# Patient Record
Sex: FEMALE | Employment: UNEMPLOYED | URBAN - METROPOLITAN AREA
[De-identification: names, ages, dates, MRNs, and addresses within clinical notes are randomized per-mention and may not be internally consistent; named-entity substitution may affect disease eponyms.]

---

## 2017-01-20 ENCOUNTER — OFFICE VISIT (OUTPATIENT)
Dept: INTERNAL MEDICINE CLINIC | Age: 32
End: 2017-01-20

## 2017-01-20 VITALS
SYSTOLIC BLOOD PRESSURE: 107 MMHG | DIASTOLIC BLOOD PRESSURE: 68 MMHG | BODY MASS INDEX: 24.1 KG/M2 | WEIGHT: 136 LBS | HEART RATE: 116 BPM | HEIGHT: 63 IN | RESPIRATION RATE: 16 BRPM | OXYGEN SATURATION: 96 % | TEMPERATURE: 98.7 F

## 2017-01-20 DIAGNOSIS — Z12.4 SCREENING FOR CERVICAL CANCER: ICD-10-CM

## 2017-01-20 DIAGNOSIS — Z00.00 ROUTINE GENERAL MEDICAL EXAMINATION AT A HEALTH CARE FACILITY: Primary | ICD-10-CM

## 2017-01-20 DIAGNOSIS — F51.04 CHRONIC INSOMNIA: ICD-10-CM

## 2017-01-20 DIAGNOSIS — Z72.0 TOBACCO USE: ICD-10-CM

## 2017-01-20 RX ORDER — TRAZODONE HYDROCHLORIDE 100 MG/1
100 TABLET ORAL
Qty: 30 TAB | Refills: 11 | Status: SHIPPED | OUTPATIENT
Start: 2017-01-20 | End: 2017-03-28 | Stop reason: SDUPTHER

## 2017-01-20 RX ORDER — TRAZODONE HYDROCHLORIDE 100 MG/1
100 TABLET ORAL
COMMUNITY
End: 2017-01-20 | Stop reason: SDUPTHER

## 2017-01-20 RX ORDER — CYCLOBENZAPRINE HCL 5 MG
5 TABLET ORAL
COMMUNITY
End: 2017-05-26

## 2017-01-20 NOTE — MR AVS SNAPSHOT
Visit Information Date & Time Provider Department Dept. Phone Encounter #  
 1/20/2017  1:00 PM Fernie Gamble, [address] [facility] [phone] [id number] Follow-up Instructions Return in about 1 year (around 1/20/2018), or if symptoms worsen or fail to improve, for Annual physical exam. Upcoming Health Maintenance Date Due Pneumococcal 19-64 Medium Risk (1 of 1 - PPSV23) 2/7/2004 DTaP/Tdap/Td series (1 - Tdap) 2/7/2006 INFLUENZA AGE 9 TO ADULT 8/1/2016 PAP AKA CERVICAL CYTOLOGY 12/26/2016 Allergies as of 1/20/2017  Review Complete On: 1/20/2017 By: Fernie Gamble MD  
 No Known Allergies Current Immunizations  Reviewed on 11/18/2015 No immunizations on file. Not reviewed this visit You Were Diagnosed With   
  
 Codes Comments Routine general medical examination at a health care facility    -  Primary ICD-10-CM: Z00.00 ICD-9-CM: V70.0 Chronic insomnia     ICD-10-CM: F51.04 
ICD-9-CM: 780.52 Tobacco use     ICD-10-CM: Z72.0 ICD-9-CM: 305.1 Screening for cervical cancer     ICD-10-CM: Z12.4 ICD-9-CM: V76.2 Vitals BP Pulse Temp Resp Height(growth percentile) Weight(growth percentile) 107/68 (BP 1 Location: Left arm, BP Patient Position: Sitting) (!) 116 98.7 °F (37.1 °C) (Oral) 16 5' 3\" (1.6 m) 136 lb (61.7 kg) SpO2 BMI OB Status Smoking Status 96% 24.09 kg/m2 IUD Current Every Day Smoker BMI and BSA Data Body Mass Index Body Surface Area 24.09 kg/m 2 1.66 m 2 Preferred Pharmacy Pharmacy Name Phone [phone] [place] [phone] Your Updated Medication List  
  
   
This list is accurate as of: 1/20/17  1:45 PM.  Always use your most recent med list.  
  
  
  
  
 cyclobenzaprine 5 mg tablet Commonly known as:  FLEXERIL Take 5 mg by mouth three (3) times daily as needed for Muscle Spasm(s). MIRENA 20 mcg/24 hr (5 years) IUD Generic drug:  levonorgestrel 1 Each by IntraUTERine route once. traZODone 100 mg tablet Commonly known as:  Konrad Rios Take 1 Tab by mouth nightly. Prescriptions Sent to Pharmacy Refills  
 traZODone (DESYREL) 100 mg tablet 11 Sig: Take 1 Tab by mouth nightly. Class: Normal  
 [facility]: [facility] [address] [address]  #: [phone] Route: Oral  
  
We Performed the Following REFERRAL TO GYNECOLOGY [REF30 Custom] Comments:  
 Call [facility] to schedule. Follow-up Instructions Return in about 1 year (around 1/20/2018), or if symptoms worsen or fail to improve, for Annual physical exam.  
  
  
Referral Information Referral ID Referred By Referred To  
  
 [id number] [facility] Not Available Visits Status Start Date End Date 1 New Request 1/20/17 1/20/18 If your referral has a status of pending review or denied, additional information will be sent to support the outcome of this decision. Patient Instructions Well Visit, Ages 25 to 48: Care Instructions Your Care Instructions Physical exams can help you stay healthy. Your doctor has checked your overall health and may have suggested ways to take good care of yourself. He or she also may have recommended tests. At home, you can help prevent illness with healthy eating, regular exercise, and other steps. Follow-up care is a key part of your treatment and safety. Be sure to make and go to all appointments, and call your doctor if you are having problems. It's also a good idea to know your test results and keep a list of the medicines you take. How can you care for yourself at home? · Reach and stay at a healthy weight. This will lower your risk for many problems, such as obesity, diabetes, heart disease, and high blood pressure. · Get at least 30 minutes of physical activity on most days of the week. Walking is a good choice. You also may want to do other activities, such as running, swimming, cycling, or playing tennis or team sports. Discuss any changes in your exercise program with your doctor. · Do not smoke or allow others to smoke around you. If you need help quitting, talk to your doctor about stop-smoking programs and medicines. These can increase your chances of quitting for good. · Talk to your doctor about whether you have any risk factors for sexually transmitted infections (STIs). Having one sex partner (who does not have STIs and does not have sex with anyone else) is a good way to avoid these infections. · Use birth control if you do not want to have children at this time. Talk with your doctor about the choices available and what might be best for you. · Protect your skin from too much sun. When you're outdoors from 10 a.m. to 4 p.m., stay in the shade or cover up with clothing and a hat with a wide brim. Wear sunglasses that block UV rays. Even when it's cloudy, put broad-spectrum sunscreen (SPF 30 or higher) on any exposed skin. · See a dentist one or two times a year for checkups and to have your teeth cleaned. · Wear a seat belt in the car. · Drink alcohol in moderation, if at all. That means no more than 2 drinks a day for men and 1 drink a day for women. Follow your doctor's advice about when to have certain tests. These tests can spot problems early. For everyone · Cholesterol. Have the fat (cholesterol) in your blood tested after age 21. Your doctor will tell you how often to have this done based on your age, family history, or other things that can increase your risk for heart disease. · Blood pressure. Have your blood pressure checked during a routine doctor visit. Your doctor will tell you how often to check your blood pressure based on your age, your blood pressure results, and other factors. · Vision.  Talk with your doctor about how often to have a glaucoma test. 
 · Diabetes. Ask your doctor whether you should have tests for diabetes. · Colon cancer. Have a test for colon cancer at age 48. You may have one of several tests. If you are younger than 48, you may need a test earlier if you have any risk factors. Risk factors include whether you already had a precancerous polyp removed from your colon or whether your parent, brother, sister, or child has had colon cancer. For women · Breast exam and mammogram. Talk to your doctor about when you should have a clinical breast exam and a mammogram. Medical experts differ on whether and how often women under 50 should have these tests. Your doctor can help you decide what is right for you. · Pap test and pelvic exam. Begin Pap tests at age 24. A Pap test is the best way to find cervical cancer. The test often is part of a pelvic exam. Ask how often to have this test. 
· Tests for sexually transmitted infections (STIs). Ask whether you should have tests for STIs. You may be at risk if you have sex with more than one person, especially if your partners do not wear condoms. For men · Tests for sexually transmitted infections (STIs). Ask whether you should have tests for STIs. You may be at risk if you have sex with more than one person, especially if you do not wear a condom. · Testicular cancer exam. Ask your doctor whether you should check your testicles regularly. · Prostate exam. Talk to your doctor about whether you should have a blood test (called a PSA test) for prostate cancer. Experts differ on whether and when men should have this test. Some experts suggest it if you are older than 39 and are -American or have a father or brother who got prostate cancer when he was younger than 72. When should you call for help? Watch closely for changes in your health, and be sure to contact your doctor if you have any problems or symptoms that concern you. Where can you learn more? Go to http://luna-ernesto.info/. Enter P072 in the search box to learn more about \"Well Visit, Ages 25 to 48: Care Instructions. \" Current as of: July 19, 2016 Content Version: 11.1 © 2376-7653 Healthwise, Incorporated. Care instructions adapted under license by [employer] (which disclaims liability or warranty for this information). If you have questions about a medical condition or this instruction, always ask your healthcare professional. [address] any warranty or liability for your use of this information. Introducing [facility] & HEALTH SERVICES! Dear Jelly Segovia: 
Thank you for requesting a [employer] account. Our records indicate that you already have an active [employer] account. You can access your account anytime at [email or web address]. Biopharmacopae/[email or web address] Did you know that you can access your hospital and ER discharge instructions at any time in [employer]? You can also review all of your test results from your hospital stay or ER visit. Additional Information If you have questions, please visit the Frequently Asked Questions section of the [employer] website at [email or web address]/. Remember, [employer] is NOT to be used for urgent needs. For medical emergencies, dial 911. Now available from your iPhone and Android! Please provide this summary of care documentation to your next provider. Your primary care clinician is listed as Renetta Marcial. If you have any questions after today's visit, please call [phone].

## 2017-01-20 NOTE — PATIENT INSTRUCTIONS

## 2017-01-20 NOTE — PROGRESS NOTES
Reviewed record  In preparation for visit and have obtained necessary documentation. 1. Have you been to the ER, urgent care clinic since your last visit? Hospitalized since your last visit? went to [facility] [place] [place] 12/19/16 for right arm pain  2. Have you seen or consulted any other health care providers outside of the [address] [place] [address] since your last visit? Include any pap smears or colon screening. Sees GYN at [facility]    Patient declines information on advanced directives. Declines flu and tdap,ppsv23. Last pap with health dept 12/2014.

## 2017-01-20 NOTE — PROGRESS NOTES
CC:  Chief Complaint   Patient presents with    Establish Care     HISTORY OF PRESENT ILLNESS  Josie Lopez is a 32 y.o. female. Presents to establish care and get medication refill. Previously followed by Rosa Corral NP at this clinic. She has chronic insomnia, tobacco use, and alcohol dependence. Today she complains of mild right arm pain; is right-handed and has been using the arm to do a lot of activity. Needs refill on trazodone. Has been taking it for about 4-5 yrs for sleep. Soc Hx  Engaged. No children. 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 She has no health insurance. Smokes 1 ppd. Drinks a glass of Vodka about 4 times a week. Used to smoke marijuana regularly in her late teens. No recreational drug use in over 10 years. Does not get regular exercise. Denies drinking coffee, tea, sodas, or energy drinks. Drinks mostly water.     Health Maintenance  Flu vaccine: declined    Pneumonia vaccine: declined     Tetanus vaccine: declined     Pap smear: 2014 with [address]; due for this (has IUD placed at the health dept)  Eye exam: 2015  Advanced Directives: given information  End of Life: given information       ROS  Constitutional: negative for fevers, chills, night sweats  ENT:   negative for sore throat, nasal congestion, ear pains, hoarseness  Respiratory:  negative for cough, hemoptysis, dyspnea,wheezing  CV:   negative for chest pain, palpitations, lower extremity edema  GI:   negative for heartburn, abd pain, nausea, vomiting, diarrhea, constipation  Genitourinary: negative for frequency, dysuria and hematuria  Integument:  negative for rash and pruritus  Musculoskel: negative for back pain, muscle weakness, joint pain  Neurological:  negative for headaches, dizziness, vertigo, gait problems  Behavl/Psych: negative for feelings of anxiety, depression, mood change     Patient Active Problem List   Diagnosis Code    Hypomagnesemia E83.42    Folate deficiency E53.8    Ingrown nail L60.0    Macrocytosis without anemia D75.89    Hypokalemia E87.6    Alcohol abuse F10.10     Past Medical History   Diagnosis Date    Bartholin cyst     Infectious disease 2012     MRSA in left and right axillary     No Known Allergies  Current Outpatient Prescriptions   Medication Sig Dispense Refill    levonorgestrel (MIRENA) 20 mcg/24 hr (5 years) IUD 1 Each by IntraUTERine route once.  oxyCODONE-acetaminophen (PERCOCET) 5-325 mg per tablet Take 1 Tab by mouth every four (4) hours as needed for Pain. Max Daily Amount: 6 Tabs. 6 Tab 0         PHYSICAL EXAM  Visit Vitals    /68 (BP 1 Location: Left arm, BP Patient Position: Sitting)    Pulse (!) 116    Temp 98.7 °F (37.1 °C) (Oral)    Resp 16    Ht 5' 3\" (1.6 m)    Wt 136 lb (61.7 kg)    SpO2 96%    BMI 24.09 kg/m2     General: Well-developed and well-nourished, no distress. HEENT:  Head normocephalic/atraumatic, no scleral icterus  Lungs:  Clear to ausculation bilaterally. Good air movement. Heart:  Tachycardiac, regular rhythm, normal S1 and S2, no murmur, gallop, or rub  Extremities: No clubbing, cyanosis, or edema. Neurological: Alert and oriented. Psychiatric: Normal mood and affect. Behavior is normal.         ASSESSMENT AND PLAN    ICD-10-CM ICD-9-CM    1. Routine general medical examination at a health care facility Z00.00 V70.0    2. Chronic insomnia F51.04 780.52 traZODone (DESYREL) 100 mg tablet   3. Tobacco use Z72.0 305.1    4. Screening for cervical cancer Z12.4 V76.2 REFERRAL TO GYNECOLOGY       Nenita Cole was seen today to establish care. She declined lab work. Diagnoses and all orders for this visit:    Routine general medical examination at a health care facility    Chronic insomnia  -     Refill traZODone (DESYREL) 100 mg tablet; Take 1 Tab by mouth nightly. Tobacco use  Counseled on smoking cessation.     Screening for cervical cancer  -     REFERRAL TO GYNECOLOGY ([facility])      Follow-up Disposition:  Return in about 1 year (around 1/20/2018), or if symptoms worsen or fail to improve, for Annual physical exam.    Provided patient and/or family with advanced directive information and answered pertinent questions. Encouraged patient to provide a copy of advanced directive to the office when available. I have discussed the diagnosis with the patient and the intended plan as seen in the above orders. Patient is in agreement. The patient has received an after-visit summary and questions were answered concerning future plans. I have discussed medication side effects and warnings with the patient as well.

## 2017-03-28 ENCOUNTER — OFFICE VISIT (OUTPATIENT)
Dept: INTERNAL MEDICINE CLINIC | Age: 32
End: 2017-03-28

## 2017-03-28 VITALS
DIASTOLIC BLOOD PRESSURE: 81 MMHG | RESPIRATION RATE: 16 BRPM | HEIGHT: 63 IN | TEMPERATURE: 97.3 F | OXYGEN SATURATION: 98 % | WEIGHT: 135 LBS | SYSTOLIC BLOOD PRESSURE: 116 MMHG | HEART RATE: 79 BPM | BODY MASS INDEX: 23.92 KG/M2

## 2017-03-28 DIAGNOSIS — F51.04 CHRONIC INSOMNIA: ICD-10-CM

## 2017-03-28 DIAGNOSIS — R30.0 BURNING WITH URINATION: ICD-10-CM

## 2017-03-28 DIAGNOSIS — N39.0 URINARY TRACT INFECTION, SITE UNSPECIFIED: Primary | ICD-10-CM

## 2017-03-28 LAB
BILIRUB UR QL STRIP: NEGATIVE
GLUCOSE UR-MCNC: NEGATIVE MG/DL
KETONES P FAST UR STRIP-MCNC: NEGATIVE MG/DL
PH UR STRIP: 6.5 [PH] (ref 4.6–8)
PROT UR QL STRIP: NORMAL MG/DL
SP GR UR STRIP: 1.02 (ref 1–1.03)
UA UROBILINOGEN AMB POC: NORMAL (ref 0.2–1)
URINALYSIS CLARITY POC: NORMAL
URINALYSIS COLOR POC: NORMAL
URINE BLOOD POC: NEGATIVE
URINE LEUKOCYTES POC: NEGATIVE
URINE NITRITES POC: NEGATIVE

## 2017-03-28 RX ORDER — TRAZODONE HYDROCHLORIDE 100 MG/1
100 TABLET ORAL
Qty: 30 TAB | Refills: 11 | Status: SHIPPED | OUTPATIENT
Start: 2017-03-28 | End: 2018-07-24 | Stop reason: SDUPTHER

## 2017-03-28 RX ORDER — CEPHALEXIN 500 MG/1
500 CAPSULE ORAL 2 TIMES DAILY
Qty: 8 CAP | Refills: 0 | Status: SHIPPED | OUTPATIENT
Start: 2017-03-28 | End: 2017-05-26

## 2017-03-28 RX ORDER — CEPHALEXIN 500 MG/1
500 CAPSULE ORAL 4 TIMES DAILY
COMMUNITY
End: 2017-03-28 | Stop reason: SDUPTHER

## 2017-03-28 NOTE — MR AVS SNAPSHOT
Visit Information Date & Time Provider Department Dept. Phone Encounter #  
 3/28/2017  1:20 PM Hermann Doty MD [facility] [phone] [id number] Follow-up Instructions Return if symptoms worsen or fail to improve. Upcoming Health Maintenance Date Due  
 PAP AKA CERVICAL CYTOLOGY 12/26/2016 DTaP/Tdap/Td series (2 - Td) 1/20/2027 Allergies as of 3/28/2017  Review Complete On: 3/28/2017 By: Hermann Doty MD  
 No Known Allergies Current Immunizations  Reviewed on 3/28/2017 No immunizations on file. Reviewed by Nydia Mckeon LPN on 7/43/1079 at  1:25 PM  
You Were Diagnosed With   
  
 Codes Comments Urinary tract infection, site unspecified    -  Primary ICD-10-CM: N39.0 Burning with urination     ICD-10-CM: R30.0 ICD-9-CM: 902. 1 Chronic insomnia     ICD-10-CM: F51.04 
ICD-9-CM: 780.52 Vitals BP Pulse Temp Resp Height(growth percentile) Weight(growth percentile) 116/81 (BP 1 Location: Right arm, BP Patient Position: Sitting) 79 97.3 °F (36.3 °C) (Oral) 16 5' 3\" (1.6 m) 135 lb (61.2 kg) SpO2 BMI OB Status Smoking Status 98% 23.91 kg/m2 IUD Current Every Day Smoker Vitals History BMI and BSA Data Body Mass Index Body Surface Area  
 23.91 kg/m 2 1.65 m 2 Preferred Pharmacy Pharmacy Name Phone [phone] [place] [phone] Your Updated Medication List  
  
   
This list is accurate as of: 3/28/17  1:43 PM.  Always use your most recent med list.  
  
  
  
  
 cephALEXin 500 mg capsule Commonly known as:  Dene Decree Take 1 Cap by mouth two (2) times a day. cyclobenzaprine 5 mg tablet Commonly known as:  FLEXERIL Take 5 mg by mouth three (3) times daily as needed for Muscle Spasm(s). MIRENA 20 mcg/24 hr (5 years) IUD Generic drug:  levonorgestrel 1 Each by IntraUTERine route once. traZODone 100 mg tablet Commonly known as:  Lee White Take 1 Tab by mouth nightly. Prescriptions Sent to Pharmacy Refills  
 traZODone (DESYREL) 100 mg tablet 11 Sig: Take 1 Tab by mouth nightly. Class: Normal  
 [facility] [place] Ph #: [phone] Route: Oral  
 cephALEXin (KEFLEX) 500 mg capsule 0 Sig: Take 1 Cap by mouth two (2) times a day. Class: Normal  
 Pharmacy: [facility] [address] Ph #: [phone] Route: Oral  
  
We Performed the Following AMB POC URINALYSIS DIP STICK AUTO W/ MICRO [49445 CPT(R)] Follow-up Instructions Return if symptoms worsen or fail to improve. Patient Instructions Urinary Tract Infection in Women: Care Instructions Your Care Instructions A urinary tract infection, or UTI, is a general term for an infection anywhere between the kidneys and the urethra (where urine comes out). Most UTIs are bladder infections. They often cause pain or burning when you urinate. UTIs are caused by bacteria and can be cured with antibiotics. Be sure to complete your treatment so that the infection goes away. Follow-up care is a key part of your treatment and safety. Be sure to make and go to all appointments, and call your doctor if you are having problems. It's also a good idea to know your test results and keep a list of the medicines you take. How can you care for yourself at home? · Take your antibiotics as directed. Do not stop taking them just because you feel better. You need to take the full course of antibiotics. · Drink extra water and other fluids for the next day or two. This may help wash out the bacteria that are causing the infection. (If you have kidney, heart, or liver disease and have to limit fluids, talk with your doctor before you increase your fluid intake.) · Avoid drinks that are carbonated or have caffeine. They can irritate the bladder. · Urinate often. Try to empty your bladder each time. · To relieve pain, take a hot bath or lay a heating pad set on low over your lower belly or genital area. Never go to sleep with a heating pad in place. To prevent UTIs · Drink plenty of water each day. This helps you urinate often, which clears bacteria from your system. (If you have kidney, heart, or liver disease and have to limit fluids, talk with your doctor before you increase your fluid intake.) · Consider adding cranberry juice to your diet. · Urinate when you need to. · Urinate right after you have sex. · Change sanitary pads often. · Avoid douches, bubble baths, feminine hygiene sprays, and other feminine hygiene products that have deodorants. · After going to the bathroom, wipe from front to back. When should you call for help? Call your doctor now or seek immediate medical care if: · Symptoms such as fever, chills, nausea, or vomiting get worse or appear for the first time. · You have new pain in your back just below your rib cage. This is called flank pain. · There is new blood or pus in your urine. · You have any problems with your antibiotic medicine. Watch closely for changes in your health, and be sure to contact your doctor if: 
· You are not getting better after taking an antibiotic for 2 days. · Your symptoms go away but then come back. Where can you learn more? Go to http://luna-ernesto.info/. Enter P016 in the search box to learn more about \"Urinary Tract Infection in Women: Care Instructions. \" Current as of: August 12, 2016 Content Version: 11.1 © 3945-8925 [employer]. Care instructions adapted under license by [employer] (which disclaims liability or warranty for this information).  If you have questions about a medical condition or this instruction, always ask your healthcare professional. [address] any warranty or liability for your use of this information. Introducing \[facility]\"" & HEALTH SERVICES! Dear Niall Goldberg: 
Thank you for requesting a [employer] account. Our records indicate that you already have an active [employer] account. You can access your account anytime at [email or web address]. [email or web address] Did you know that you can access your hospital and ER discharge instructions at any time in [employer]? You can also review all of your test results from your hospital stay or ER visit. Additional Information If you have questions, please visit the Frequently Asked Questions section of the [employer] website at [email or web address] [email or web address] Remember, [employer] is NOT to be used for urgent needs. For medical emergencies, dial 911. Now available from your iPhone and Android! Please provide this summary of care documentation to your next provider. Your primary care clinician is listed as Katherine Binning. If you have any questions after today's visit, please call [phone].

## 2017-03-28 NOTE — PROGRESS NOTES
CC:  Chief Complaint   Patient presents with    Bladder Infection     X 1 week      HISTORY OF PRESENT ILLNESS  Judd Field is a 28 y.o. female. She complains of 1 week history of increased urinary frequency, dysuria, and lower abdominal pain that worsened 4 days ago. Started taking her roommate's Keflex from an older prescription at a dose of 500 mg 4 times daily. Denies fevers, chills, back pain, urinary urgency. Patient Active Problem List   Diagnosis Code    Hypomagnesemia E83.42    Folate deficiency E53.8    Ingrown nail L60.0    Macrocytosis without anemia D75.89    Hypokalemia E87.6    Alcohol abuse F10.10     Past Medical History:   Diagnosis Date    Bartholin cyst     Infectious disease 2012    MRSA in left and right axillary     No Known Allergies  Current Outpatient Prescriptions   Medication Sig Dispense Refill    cephALEXin (KEFLEX) 500 mg capsule Take 500 mg by mouth four (4) times daily.  traZODone (DESYREL) 100 mg tablet Take 1 Tab by mouth nightly. 30 Tab 11    levonorgestrel (MIRENA) 20 mcg/24 hr (5 years) IUD 1 Each by IntraUTERine route once.  cyclobenzaprine (FLEXERIL) 5 mg tablet Take 5 mg by mouth three (3) times daily as needed for Muscle Spasm(s). PHYSICAL EXAM  Visit Vitals    /81 (BP 1 Location: Right arm, BP Patient Position: Sitting)    Pulse 79    Temp 97.3 °F (36.3 °C) (Oral)    Resp 16    Ht 5' 3\" (1.6 m)    Wt 135 lb (61.2 kg)    SpO2 98%    BMI 23.91 kg/m2       General: Well-developed and well-nourished, no distress. Lungs:  Clear to ausculation bilaterally. Good air movement. Heart:  Regular rate and rhythm, normal S1 and S2, no murmur, gallop, or rub  Abdomen: Soft, non-distended, normal bowel sounds, no tenderness, no guarding, masses, rebound tenderness, or HSM. Psychiatric: Normal mood and affect.  Behavior is normal.     Results for orders placed or performed in visit on 03/28/17   AMB POC URINALYSIS DIP STICK AUTO W/ MICRO   Result Value Ref Range    Color (UA POC) Dark Yellow     Clarity (UA POC) Cloudy     Glucose (UA POC) Negative Negative    Bilirubin (UA POC) Negative Negative    Ketones (UA POC) Negative Negative    Specific gravity (UA POC) 1.020 1.001 - 1.035    Blood (UA POC) Negative Negative    pH (UA POC) 6.5 4.6 - 8.0    Protein (UA POC) Trace Negative mg/dL    Urobilinogen (UA POC) 0.2 mg/dL 0.2 - 1    Nitrites (UA POC) Negative Negative    Leukocyte esterase (UA POC) Negative Negative         ASSESSMENT AND PLAN    ICD-10-CM ICD-9-CM    1. Urinary tract infection, site unspecified N39.0     2. Burning with urination R30.0 788.1 AMB POC URINALYSIS DIP STICK AUTO W/ MICRO   3. Chronic insomnia F51.04 780.52 traZODone (DESYREL) 100 mg tablet       Toyin Dolan was seen today for bladder infection. Diagnoses and all orders for this visit:    Urinary tract infection, site unspecified  Urine dip without leukocytes most likely due to taking an antibiotic at home already. I advised her not to do this in the future, but instead to get OTC Azo until she sees a doctor. -     cephALEXin (KEFLEX) 500 mg capsule; Take 1 Cap by mouth two (2) times a day. (I gave her 4-day supply to finish total of 7 days)    Burning with urination  -     AMB POC URINALYSIS DIP STICK AUTO W/ MICRO    Chronic insomnia  -     Refill traZODone (DESYREL) 100 mg tablet; Take 1 Tab by mouth nightly. Follow-up Disposition:  Return if symptoms worsen or fail to improve. Provided patient and/or family with advanced directive information and answered pertinent questions. Encouraged patient to provide a copy of advanced directive to the office when available. I have discussed the diagnosis with the patient and the intended plan as seen in the above orders. Patient is in agreement. The patient has received an after-visit summary and questions were answered concerning future plans.   I have discussed medication side effects and warnings with the patient as well.

## 2017-03-28 NOTE — PROGRESS NOTES
Patient received paperwork for advance directive during previous visit but has not completed at this time     Reviewed record In preparation for visit and have obtained necessary documentation      1. Have you been to the ER, urgent care clinic since your last visit? Hospitalized since your last visit? NO    2. Have you seen or consulted any other health care providers outside of the [address] [place] [address] since your last visit? Include any pap smears or colon screening.  NO      Patient found roommates keflex and started taking 2 days ago

## 2017-03-28 NOTE — PATIENT INSTRUCTIONS
Urinary Tract Infection in Women: Care Instructions  Your Care Instructions    A urinary tract infection, or UTI, is a general term for an infection anywhere between the kidneys and the urethra (where urine comes out). Most UTIs are bladder infections. They often cause pain or burning when you urinate. UTIs are caused by bacteria and can be cured with antibiotics. Be sure to complete your treatment so that the infection goes away. Follow-up care is a key part of your treatment and safety. Be sure to make and go to all appointments, and call your doctor if you are having problems. It's also a good idea to know your test results and keep a list of the medicines you take. How can you care for yourself at home? · Take your antibiotics as directed. Do not stop taking them just because you feel better. You need to take the full course of antibiotics. · Drink extra water and other fluids for the next day or two. This may help wash out the bacteria that are causing the infection. (If you have kidney, heart, or liver disease and have to limit fluids, talk with your doctor before you increase your fluid intake.)  · Avoid drinks that are carbonated or have caffeine. They can irritate the bladder. · Urinate often. Try to empty your bladder each time. · To relieve pain, take a hot bath or lay a heating pad set on low over your lower belly or genital area. Never go to sleep with a heating pad in place. To prevent UTIs  · Drink plenty of water each day. This helps you urinate often, which clears bacteria from your system. (If you have kidney, heart, or liver disease and have to limit fluids, talk with your doctor before you increase your fluid intake.)  · Consider adding cranberry juice to your diet. · Urinate when you need to. · Urinate right after you have sex. · Change sanitary pads often. · Avoid douches, bubble baths, feminine hygiene sprays, and other feminine hygiene products that have deodorants.   · After going to the bathroom, wipe from front to back. When should you call for help? Call your doctor now or seek immediate medical care if:  · Symptoms such as fever, chills, nausea, or vomiting get worse or appear for the first time. · You have new pain in your back just below your rib cage. This is called flank pain. · There is new blood or pus in your urine. · You have any problems with your antibiotic medicine. Watch closely for changes in your health, and be sure to contact your doctor if:  · You are not getting better after taking an antibiotic for 2 days. · Your symptoms go away but then come back. Where can you learn more? Go to http://luna-ernesto.info/. Enter N695 in the search box to learn more about \"Urinary Tract Infection in Women: Care Instructions. \"  Current as of: August 12, 2016  Content Version: 11.1  © 9025-4083 [employer]. Care instructions adapted under license by [employer] (which disclaims liability or warranty for this information). If you have questions about a medical condition or this instruction, always ask your healthcare professional. [address] any warranty or liability for your use of this information.

## 2017-05-25 ENCOUNTER — DOCUMENTATION ONLY (OUTPATIENT)
Dept: INTERNAL MEDICINE CLINIC | Age: 32
End: 2017-05-25

## 2017-05-26 ENCOUNTER — HOSPITAL ENCOUNTER (EMERGENCY)
Age: 32
Discharge: HOME | End: 2017-05-26
Attending: EMERGENCY MEDICINE | Admitting: EMERGENCY MEDICINE
Payer: COMMERCIAL

## 2017-05-26 ENCOUNTER — HOSPITAL ENCOUNTER (OUTPATIENT)
Dept: LAB | Age: 32
Discharge: HOME | End: 2017-05-26

## 2017-05-26 ENCOUNTER — APPOINTMENT (OUTPATIENT)
Dept: CT IMAGING | Age: 32
End: 2017-05-26
Attending: PHYSICIAN ASSISTANT
Payer: COMMERCIAL

## 2017-05-26 ENCOUNTER — HOSPITAL ENCOUNTER (EMERGENCY)
Age: 32
Discharge: OTHER INSTITUTION | End: 2017-05-26
Attending: FAMILY MEDICINE

## 2017-05-26 VITALS
TEMPERATURE: 98 F | OXYGEN SATURATION: 95 % | WEIGHT: 128 LBS | DIASTOLIC BLOOD PRESSURE: 96 MMHG | BODY MASS INDEX: 22.68 KG/M2 | SYSTOLIC BLOOD PRESSURE: 133 MMHG | HEIGHT: 63 IN | RESPIRATION RATE: 16 BRPM | HEART RATE: 94 BPM

## 2017-05-26 VITALS
SYSTOLIC BLOOD PRESSURE: 126 MMHG | OXYGEN SATURATION: 97 % | TEMPERATURE: 98.4 F | DIASTOLIC BLOOD PRESSURE: 90 MMHG | HEART RATE: 105 BPM | HEIGHT: 63 IN | WEIGHT: 128.31 LBS | RESPIRATION RATE: 16 BRPM | BODY MASS INDEX: 22.73 KG/M2

## 2017-05-26 DIAGNOSIS — Z72.0 TOBACCO ABUSE: ICD-10-CM

## 2017-05-26 DIAGNOSIS — R10.31 ABDOMINAL PAIN, RLQ: Primary | ICD-10-CM

## 2017-05-26 DIAGNOSIS — R10.31 ABDOMINAL PAIN, RIGHT LOWER QUADRANT: Primary | ICD-10-CM

## 2017-05-26 LAB
ALBUMIN SERPL BCP-MCNC: 3.9 G/DL (ref 3.5–5)
ALBUMIN/GLOB SERPL: 1.1 {RATIO} (ref 1.1–2.2)
ALP SERPL-CCNC: 66 U/L (ref 45–117)
ALT SERPL-CCNC: 25 U/L (ref 12–78)
ANION GAP BLD CALC-SCNC: 10 MMOL/L (ref 5–15)
APPEARANCE UR: ABNORMAL
AST SERPL W P-5'-P-CCNC: 31 U/L (ref 15–37)
BACTERIA URNS QL MICRO: NEGATIVE /HPF
BASOPHILS # BLD AUTO: 0 K/UL (ref 0–0.1)
BASOPHILS # BLD: 1 % (ref 0–1)
BILIRUB SERPL-MCNC: 0.2 MG/DL (ref 0.2–1)
BILIRUB UR QL: NEGATIVE
BILIRUB UR QL: NEGATIVE
BUN SERPL-MCNC: 8 MG/DL (ref 6–20)
BUN/CREAT SERPL: 12 (ref 12–20)
CALCIUM SERPL-MCNC: 8.3 MG/DL (ref 8.5–10.1)
CHLORIDE SERPL-SCNC: 104 MMOL/L (ref 97–108)
CLUE CELLS VAG QL WET PREP: NORMAL
CO2 SERPL-SCNC: 28 MMOL/L (ref 21–32)
COLOR UR: ABNORMAL
CREAT SERPL-MCNC: 0.65 MG/DL (ref 0.55–1.02)
EOSINOPHIL # BLD: 0 K/UL (ref 0–0.4)
EOSINOPHIL NFR BLD: 1 % (ref 0–7)
EPITH CASTS URNS QL MICRO: ABNORMAL /LPF
ERYTHROCYTE [DISTWIDTH] IN BLOOD BY AUTOMATED COUNT: 12.8 % (ref 11.5–14.5)
GLOBULIN SER CALC-MCNC: 3.5 G/DL (ref 2–4)
GLUCOSE SERPL-MCNC: 91 MG/DL (ref 65–100)
GLUCOSE UR QL STRIP.AUTO: NEGATIVE MG/DL
GLUCOSE UR STRIP.AUTO-MCNC: NEGATIVE MG/DL
HCG UR QL: NEGATIVE
HCG UR QL: NEGATIVE
HCT VFR BLD AUTO: 41.1 % (ref 35–47)
HGB BLD-MCNC: 14.4 G/DL (ref 11.5–16)
HGB UR QL STRIP: NEGATIVE
KETONES UR QL STRIP.AUTO: NEGATIVE MG/DL
KETONES UR-MCNC: NEGATIVE MG/DL
KOH PREP SPEC: NORMAL
LACTATE SERPL-SCNC: 2 MMOL/L (ref 0.4–2)
LEUKOCYTE ESTERASE UR QL STRIP.AUTO: NEGATIVE
LEUKOCYTE ESTERASE UR QL STRIP: NEGATIVE
LYMPHOCYTES # BLD AUTO: 43 % (ref 12–49)
LYMPHOCYTES # BLD: 1.8 K/UL (ref 0.8–3.5)
MCH RBC QN AUTO: 35.7 PG (ref 26–34)
MCHC RBC AUTO-ENTMCNC: 35 G/DL (ref 30–36.5)
MCV RBC AUTO: 102 FL (ref 80–99)
MONOCYTES # BLD: 0.4 K/UL (ref 0–1)
MONOCYTES NFR BLD AUTO: 11 % (ref 5–13)
NEUTS SEG # BLD: 1.8 K/UL (ref 1.8–8)
NEUTS SEG NFR BLD AUTO: 44 % (ref 32–75)
NITRITE UR QL STRIP.AUTO: NEGATIVE
NITRITE UR QL: NEGATIVE
PH UR STRIP: 6 [PH] (ref 5–8)
PH UR: 7 [PH] (ref 5–8)
PLATELET # BLD AUTO: 214 K/UL (ref 150–400)
POTASSIUM SERPL-SCNC: 3.2 MMOL/L (ref 3.5–5.1)
PROT SERPL-MCNC: 7.4 G/DL (ref 6.4–8.2)
PROT UR QL: NEGATIVE MG/DL
PROT UR STRIP-MCNC: NEGATIVE MG/DL
RBC # BLD AUTO: 4.03 M/UL (ref 3.8–5.2)
RBC # UR STRIP: ABNORMAL /UL
RBC #/AREA URNS HPF: ABNORMAL /HPF (ref 0–5)
SERVICE CMNT-IMP: NORMAL
SODIUM SERPL-SCNC: 142 MMOL/L (ref 136–145)
SP GR UR REFRACTOMETRY: 1.01 (ref 1–1.03)
SP GR UR: 1.01 (ref 1–1.03)
T VAGINALIS VAG QL WET PREP: NORMAL
UROBILINOGEN UR QL STRIP.AUTO: 0.2 EU/DL (ref 0.2–1)
UROBILINOGEN UR QL: 0.2 EU/DL (ref 0.2–1)
WBC # BLD AUTO: 4.1 K/UL (ref 3.6–11)
WBC URNS QL MICRO: ABNORMAL /HPF (ref 0–4)

## 2017-05-26 PROCEDURE — 96372 THER/PROPH/DIAG INJ SC/IM: CPT

## 2017-05-26 PROCEDURE — 81003 URINALYSIS AUTO W/O SCOPE: CPT | Performed by: EMERGENCY MEDICINE

## 2017-05-26 PROCEDURE — 74011250636 HC RX REV CODE- 250/636: Performed by: PHYSICIAN ASSISTANT

## 2017-05-26 PROCEDURE — 85025 COMPLETE CBC W/AUTO DIFF WBC: CPT | Performed by: EMERGENCY MEDICINE

## 2017-05-26 PROCEDURE — 74011000250 HC RX REV CODE- 250: Performed by: PHYSICIAN ASSISTANT

## 2017-05-26 PROCEDURE — 99284 EMERGENCY DEPT VISIT MOD MDM: CPT

## 2017-05-26 PROCEDURE — 74011250637 HC RX REV CODE- 250/637: Performed by: PHYSICIAN ASSISTANT

## 2017-05-26 PROCEDURE — 81025 URINE PREGNANCY TEST: CPT | Performed by: EMERGENCY MEDICINE

## 2017-05-26 PROCEDURE — 96361 HYDRATE IV INFUSION ADD-ON: CPT

## 2017-05-26 PROCEDURE — 80053 COMPREHEN METABOLIC PANEL: CPT | Performed by: EMERGENCY MEDICINE

## 2017-05-26 PROCEDURE — 74011250636 HC RX REV CODE- 250/636: Performed by: EMERGENCY MEDICINE

## 2017-05-26 PROCEDURE — 36415 COLL VENOUS BLD VENIPUNCTURE: CPT | Performed by: EMERGENCY MEDICINE

## 2017-05-26 PROCEDURE — 96375 TX/PRO/DX INJ NEW DRUG ADDON: CPT

## 2017-05-26 PROCEDURE — 74177 CT ABD & PELVIS W/CONTRAST: CPT

## 2017-05-26 PROCEDURE — 74011636320 HC RX REV CODE- 636/320: Performed by: EMERGENCY MEDICINE

## 2017-05-26 PROCEDURE — 96374 THER/PROPH/DIAG INJ IV PUSH: CPT

## 2017-05-26 PROCEDURE — 87086 URINE CULTURE/COLONY COUNT: CPT | Performed by: FAMILY MEDICINE

## 2017-05-26 PROCEDURE — 83605 ASSAY OF LACTIC ACID: CPT | Performed by: EMERGENCY MEDICINE

## 2017-05-26 PROCEDURE — 87210 SMEAR WET MOUNT SALINE/INK: CPT

## 2017-05-26 PROCEDURE — 87491 CHLMYD TRACH DNA AMP PROBE: CPT

## 2017-05-26 RX ORDER — OXYCODONE AND ACETAMINOPHEN 5; 325 MG/1; MG/1
1 TABLET ORAL
Qty: 10 TAB | Refills: 0 | Status: SHIPPED | OUTPATIENT
Start: 2017-05-26 | End: 2017-05-29

## 2017-05-26 RX ORDER — CEPHALEXIN 500 MG/1
500 CAPSULE ORAL 3 TIMES DAILY
Qty: 21 CAP | Refills: 0 | Status: SHIPPED | OUTPATIENT
Start: 2017-05-26 | End: 2017-05-26

## 2017-05-26 RX ORDER — SODIUM CHLORIDE 9 MG/ML
50 INJECTION, SOLUTION INTRAVENOUS
Status: COMPLETED | OUTPATIENT
Start: 2017-05-26 | End: 2017-05-26

## 2017-05-26 RX ORDER — HYDROMORPHONE HYDROCHLORIDE 1 MG/ML
0.5 INJECTION, SOLUTION INTRAMUSCULAR; INTRAVENOUS; SUBCUTANEOUS
Status: COMPLETED | OUTPATIENT
Start: 2017-05-26 | End: 2017-05-26

## 2017-05-26 RX ORDER — AZITHROMYCIN 250 MG/1
1000 TABLET, FILM COATED ORAL
Status: COMPLETED | OUTPATIENT
Start: 2017-05-26 | End: 2017-05-26

## 2017-05-26 RX ORDER — MORPHINE SULFATE 4 MG/ML
4 INJECTION, SOLUTION INTRAMUSCULAR; INTRAVENOUS ONCE
Status: COMPLETED | OUTPATIENT
Start: 2017-05-26 | End: 2017-05-26

## 2017-05-26 RX ORDER — SODIUM CHLORIDE 0.9 % (FLUSH) 0.9 %
10 SYRINGE (ML) INJECTION
Status: COMPLETED | OUTPATIENT
Start: 2017-05-26 | End: 2017-05-26

## 2017-05-26 RX ADMIN — MORPHINE SULFATE 4 MG: 4 INJECTION, SOLUTION INTRAMUSCULAR; INTRAVENOUS at 14:57

## 2017-05-26 RX ADMIN — HYDROMORPHONE HYDROCHLORIDE 0.5 MG: 1 INJECTION, SOLUTION INTRAMUSCULAR; INTRAVENOUS; SUBCUTANEOUS at 15:53

## 2017-05-26 RX ADMIN — AZITHROMYCIN 1000 MG: 250 TABLET, FILM COATED ORAL at 17:09

## 2017-05-26 RX ADMIN — Medication 10 ML: at 16:24

## 2017-05-26 RX ADMIN — SODIUM CHLORIDE 1000 ML: 900 INJECTION, SOLUTION INTRAVENOUS at 14:56

## 2017-05-26 RX ADMIN — SODIUM CHLORIDE 50 ML/HR: 900 INJECTION, SOLUTION INTRAVENOUS at 16:23

## 2017-05-26 RX ADMIN — IOPAMIDOL 100 ML: 755 INJECTION, SOLUTION INTRAVENOUS at 16:24

## 2017-05-26 RX ADMIN — LIDOCAINE HYDROCHLORIDE 250 MG: 10 INJECTION, SOLUTION EPIDURAL; INFILTRATION; INTRACAUDAL; PERINEURAL at 17:09

## 2017-05-26 NOTE — ED PROVIDER NOTES
HPI Comments: Mulu Patterson is a 28 y.o. female with a PMH of bartholin cyst presenting ambulatory to the ED from [address] abdominal pain x 5 days. Patient states that on Monday she started having a \"nagging\" RLQ abdominal pain with some associated dysuria and hematuria. She had some leftover Cephalexin at home, and took 6 doses for suspected UTI. She states that the dysuria and hematuria resolved, but the abdominal pain continued. She denies fevers, chills, chest pain, SOB, flank pain, nausea, vomiting, diarrhea or blood in her stool. She reports some brief vaginal bleeding earlier this week, which she states was abnormal. She has an IUD in place and does not get a regular period. She denies any chance of pregnancy. She states that she has not been sexually active recently. Per nursing, patient reports that she has a history of GC and chlamydia and that this pain feels similar to when she was diagnosed with that. She denies any abdominal surgeries. Patient denies any other symptoms or complaints. PCP: Gogo Vazquez MD    There are no other complaints, changes or physical findings at this time. The history is provided by the patient. No  was used. Past Medical History:   Diagnosis Date    Bartholin cyst     Infectious disease 2012    MRSA in left and right axillary       History reviewed. No pertinent surgical history. Family History:   Problem Relation Age of Onset    No Known Problems Mother     No Known Problems Father     Psoriasis Sister     Bipolar Disorder Brother     Schizophrenia Brother     Heart Disease Maternal Uncle     Heart Disease Maternal Grandmother     Heart Disease Maternal Grandfather        Social History     Social History    Marital status: SINGLE     Spouse name: N/A    Number of children: N/A    Years of education: N/A     Occupational History    Not on file.      Social History Main Topics    Smoking status: Current Every Day Smoker Packs/day: 1.00     Years: 15.00    Smokeless tobacco: Never Used    Alcohol use 3.5 oz/week     7 Shots of liquor per week      Comment: 2-3/week    Drug use: No    Sexual activity: Yes     Birth control/ protection: Condom, IUD     Other Topics Concern    Not on file     Social History Narrative         ALLERGIES: Review of patient's allergies indicates no known allergies. Review of Systems   Constitutional: Negative for chills and fever. HENT: Negative for sore throat. Eyes: Negative for pain. Respiratory: Negative for cough and shortness of breath. Cardiovascular: Negative for chest pain. Gastrointestinal: Positive for abdominal pain. Negative for blood in stool, diarrhea, nausea and vomiting. Genitourinary: Positive for dysuria (resolved), hematuria (resolved) and vaginal bleeding. Musculoskeletal: Negative for arthralgias and myalgias. Skin: Negative for rash. Neurological: Negative for dizziness, light-headedness, numbness and headaches. Psychiatric/Behavioral: Negative for behavioral problems and confusion. Vitals:    05/26/17 1328 05/26/17 1530 05/26/17 1630 05/26/17 1700   BP: (!) 117/96 113/83 126/90    Pulse: (!) 105      Resp: 16      Temp: 98.4 °F (36.9 °C)      SpO2: 100% 99% 100% 97%   Weight: 58.2 kg (128 lb 4.9 oz)      Height: 5' 3\" (1.6 m)               Physical Exam   Constitutional: She is oriented to person, place, and time. She appears well-developed and well-nourished. No distress. 29 y/o  female in no acute distress   HENT:   Head: Normocephalic and atraumatic. Right Ear: External ear normal.   Left Ear: External ear normal.   Nose: Nose normal.   Eyes: Conjunctivae and EOM are normal.   Neck: Normal range of motion. Neck supple. Cardiovascular: Normal rate, regular rhythm and normal heart sounds. No murmur heard. Pulmonary/Chest: Effort normal and breath sounds normal. She has no decreased breath sounds. She has no wheezes. Abdominal: Soft. Normal appearance and bowel sounds are normal. She exhibits no distension. There is tenderness in the right lower quadrant. There is tenderness at McBurney's point. There is no rigidity, no rebound, no guarding and no CVA tenderness. Pierced naval   Musculoskeletal: Normal range of motion. She exhibits no edema or tenderness. Neurological: She is alert and oriented to person, place, and time. Skin: Skin is warm and dry. No rash noted. She is not diaphoretic. Psychiatric: She has a normal mood and affect. Her behavior is normal. Judgment normal.   Nursing note and vitals reviewed. MDM  Number of Diagnoses or Management Options  Abdominal pain, RLQ:   Tobacco abuse:   Diagnosis management comments: DDx: appendicitis, diverticulitis, diverticulosis, nephrolithiasis, cystitis, UTI, pyelonephritis, urinary retention, obstruction, constipation, colitis, ovarian cyst, pregnancy, ectopic pregnancy, pelvic inflammatory disease, endometriosis. Patient presents with RLQ abdominal pain x 5 days. Patient sent from [facility] for r/o appendicitis. Will assess with labs, urine, CT abd/pelv with contrast, and control pain. CT negative, labs unremarkable. Will send home with pain medications and recommend f/u with PCP in 2-3 days for recheck. Amount and/or Complexity of Data Reviewed  Clinical lab tests: ordered and reviewed  Tests in the radiology section of CPT®: ordered and reviewed    Patient Progress  Patient progress: stable    ED Course       Pelvic Exam  Date/Time: 5/26/2017 4:50 PM  Performed by: PA  Procedure duration:  10 minutes. Documented by:  Adwoa Lomax PA-C. Exam assisted by:  Emigdio Peterson RN. Type of exam performed: bimanual and speculum. External genitalia appearance: normal.    Vaginal exam:  normal.    Cervical exam:  normal, no cervical motion tenderness and os closed. Specimen(s) collected:  chlamydia and GC. Bimanual exam:  right adenexal tenderness.     Patient tolerance: Patient tolerated the procedure well with no immediate complications          Chief Complaint   Patient presents with    Abdominal Pain     pt reports RLQ pain beginning Monday, seen at Urgent Care today and sent to ED for eval       2:25 PM  The patients presenting problems have been discussed, and they are in agreement with the care plan formulated and outlined with them. I have encouraged them to ask questions as they arise throughout their visit.     MEDICATIONS GIVEN:  Medications   morphine injection 4 mg (4 mg IntraVENous Given 5/26/17 1457)   sodium chloride 0.9 % bolus infusion 1,000 mL (0 mL IntraVENous IV Completed 5/26/17 1731)   0.9% sodium chloride infusion (50 mL/hr IntraVENous New Bag 5/26/17 1623)   iopamidol (ISOVUE-370) 76 % injection 100 mL (100 mL IntraVENous Given 5/26/17 1624)   sodium chloride (NS) flush 10 mL (10 mL IntraVENous Given 5/26/17 1624)   HYDROmorphone (PF) (DILAUDID) injection 0.5 mg (0.5 mg IntraVENous Given 5/26/17 1553)   cefTRIAXone (ROCEPHIN) 250 mg in lidocaine (PF) (XYLOCAINE) 10 mg/mL (1 %) IM injection (250 mg IntraMUSCular Given 5/26/17 1709)   azithromycin (ZITHROMAX) tablet 1,000 mg (1,000 mg Oral Given 5/26/17 1709)       LABS REVIEWED:  Recent Results (from the past 24 hour(s))   POC URINE MACROSCOPIC    Collection Time: 05/26/17 12:43 PM   Result Value Ref Range    Spec. gravity (POC) 1.010 1.003 - 1.030      pH, urine  (POC) 7.0 5.0 - 8.0      Protein (POC) NEGATIVE  NEG mg/dL    Glucose, urine (POC) NEGATIVE  NEG mg/dL    Ketones (POC) NEGATIVE  NEG mg/dL    Bilirubin (POC) NEGATIVE  NEG      Blood (POC) TRACE (A) NEG      Urobilinogen (POC) 0.2 0.2 - 1.0 EU/dL    Nitrite (POC) NEGATIVE  NEG      Leukocyte esterase (POC) NEGATIVE  NEG     HCG URINE, QL. - POC    Collection Time: 05/26/17 12:46 PM   Result Value Ref Range    Pregnancy test,urine (POC) NEGATIVE  NEG     URINALYSIS W/ RFLX MICROSCOPIC    Collection Time: 05/26/17  1:34 PM   Result Value Ref Range    Color YELLOW/STRAW      Appearance HAZY (A) CLEAR      Specific gravity 1.010 1.003 - 1.030      pH (UA) 6.0 5.0 - 8.0      Protein NEGATIVE  NEG mg/dL    Glucose NEGATIVE  NEG mg/dL    Ketone NEGATIVE  NEG mg/dL    Bilirubin NEGATIVE  NEG      Blood NEGATIVE  NEG      Urobilinogen 0.2 0.2 - 1.0 EU/dL    Nitrites NEGATIVE  NEG      Leukocyte Esterase NEGATIVE  NEG      WBC 0-4 0 - 4 /hpf    RBC 0-5 0 - 5 /hpf    Epithelial cells FEW FEW /lpf    Bacteria NEGATIVE  NEG /hpf   HCG URINE, QL    Collection Time: 05/26/17  1:34 PM   Result Value Ref Range    HCG urine, Ql. NEGATIVE  NEG     METABOLIC PANEL, COMPREHENSIVE    Collection Time: 05/26/17  2:31 PM   Result Value Ref Range    Sodium 142 136 - 145 mmol/L    Potassium 3.2 (L) 3.5 - 5.1 mmol/L    Chloride 104 97 - 108 mmol/L    CO2 28 21 - 32 mmol/L    Anion gap 10 5 - 15 mmol/L    Glucose 91 65 - 100 mg/dL    BUN 8 6 - 20 MG/DL    Creatinine 0.65 0.55 - 1.02 MG/DL    BUN/Creatinine ratio 12 12 - 20      GFR est AA >60 >60 ml/min/1.73m2    GFR est non-AA >60 >60 ml/min/1.73m2    Calcium 8.3 (L) 8.5 - 10.1 MG/DL    Bilirubin, total 0.2 0.2 - 1.0 MG/DL    ALT (SGPT) 25 12 - 78 U/L    AST (SGOT) 31 15 - 37 U/L    Alk. phosphatase 66 45 - 117 U/L    Protein, total 7.4 6.4 - 8.2 g/dL    Albumin 3.9 3.5 - 5.0 g/dL    Globulin 3.5 2.0 - 4.0 g/dL    A-G Ratio 1.1 1.1 - 2.2     CBC WITH AUTOMATED DIFF    Collection Time: 05/26/17  2:31 PM   Result Value Ref Range    WBC 4.1 3.6 - 11.0 K/uL    RBC 4.03 3.80 - 5.20 M/uL    HGB 14.4 11.5 - 16.0 g/dL    HCT 41.1 35.0 - 47.0 %    .0 (H) 80.0 - 99.0 FL    MCH 35.7 (H) 26.0 - 34.0 PG    MCHC 35.0 30.0 - 36.5 g/dL    RDW 12.8 11.5 - 14.5 %    PLATELET 422 894 - 847 K/uL    NEUTROPHILS 44 32 - 75 %    LYMPHOCYTES 43 12 - 49 %    MONOCYTES 11 5 - 13 %    EOSINOPHILS 1 0 - 7 %    BASOPHILS 1 0 - 1 %    ABS. NEUTROPHILS 1.8 1.8 - 8.0 K/UL    ABS. LYMPHOCYTES 1.8 0.8 - 3.5 K/UL    ABS.  MONOCYTES 0.4 0.0 - 1.0 K/UL ABS. EOSINOPHILS 0.0 0.0 - 0.4 K/UL    ABS. BASOPHILS 0.0 0.0 - 0.1 K/UL   LACTIC ACID, PLASMA    Collection Time: 05/26/17  3:37 PM   Result Value Ref Range    Lactic acid 2.0 0.4 - 2.0 MMOL/L   GLEN, OTHER SOURCES    Collection Time: 05/26/17  4:55 PM   Result Value Ref Range    Special Requests: NO SPECIAL REQUESTS      KOH NO YEAST SEEN     WET PREP    Collection Time: 05/26/17  4:55 PM   Result Value Ref Range    Clue cells CLUE CELLS ABSENT      Wet prep NO TRICHOMONAS SEEN         VITAL SIGNS:  Patient Vitals for the past 12 hrs:   Temp Pulse Resp BP SpO2   05/26/17 1700 - - - - 97 %   05/26/17 1630 - - - 126/90 100 %   05/26/17 1530 - - - 113/83 99 %   05/26/17 1328 98.4 °F (36.9 °C) (!) 105 16 (!) 117/96 100 %       RADIOLOGY RESULTS:  The following have been ordered and reviewed:  CT ABD PELV W CONT   Final Result   INDICATION: RLQ abdominal pain, r/o appy      EXAM: CT Abdomen and CT Pelvis are performed with 100 mL Isovue 370contrast.  CT dose reduction was achieved through use of a standardized protocol tailored  for this examination and automatic exposure control for dose modulation.     FINDINGS:   There is no focal inflammation, ascites, free air or significant adenopathy. Liver shows no significant finding. Bile ducts are not enlarged. Pancreas shows  no mass or inflammation. Spleen is unremarkable. Adrenal glands are normal in  size. Kidneys show no mass or hydronephrosis; there is a right renal cyst and a  punctate right renal nonobstructing stone. Aorta shows no aneurysm.      The appendix is normal. The bladder is midline and the distal ureters are not  dilated. There is no apparent pelvic mass. There is an IUD in the uterus.     IMPRESSION  IMPRESSION: No Acute Disease. PROCEDURES:  Procedure Note - Pelvic Exam:    4:50 PM  Performed by: Sandeep Gaitan PA-C  Chaperoned by: Raf Mathur RN  Pelvic exam was performed using bimanual and speculum.  Further findings noted in physical exam. The procedure took 10 minutes, and pt tolerated well. PROGRESS NOTES:  2:25 PM  Initial assessment of patient complete, and patient was given the opportunity to ask questions. Plan of care reviewed with patient and will update when results are available. 3:42 PM  I have just reevaluated the patient. I have reviewed her vital signs and determined there is currently no worsening in their condition or physical exam. Results have been reviewed with them and their questions have been answered. We will continue to review further results as they come available. Patient drinking oral contrast. She reports some relief of her pain initially with meds, but the pain has continued. Tobacco Cessation Education  3:50 PM  Discussed the risks of smoking and the benefits of smoking cessation as well as the long term sequelae of smoking with the pt. The patient verbalized their understanding. 4:50 PM  Discussed with patient need for pelvic exam to r/o infectious cause for pelvic pain. Patient agrees to have pelvic exam performed. Discussed that swabs will be taken to r/o infectious etiology of symptoms, but it is still important to she see her [facility] provider regularly to receive pap smears and other health screening tools. Advised patient that while some tests are resulted while she is in the emergency department, the chlamydia and gonorrhea tests will not result until 48-72 hours from now. Instructed patient that she will receive a call if either of those tests return as positive. Advised patient that if they do return positive, she will need to return to the ED for treatment. Offered patient prophylactic treatment for GC/Chlamydia while she is in the ED if there is suspicion that these may result as positive. Patient accepts treatment at this time. 5:33 PM  I have reviewed discharge instructions with the patient and explained medications that she is being discharged with.  The patient verbalized understanding and agrees with plan. DIAGNOSIS:    1. Abdominal pain, RLQ    2. Tobacco abuse        PLAN:  1. Discharge home  2. Medications as directed  3. F/u with PCP in 2-3 days  4. Return precautions reviewed    Follow-up Information     Follow up With Details Comments [address], [place] Schedule an appointment as soon as possible for a visit in 3 days  Yosvany Pedersen  [facility]  [phone]      [facility] EMERGENCY DEPT  As needed, If symptoms worsen [address] [place] [address]  [phone]        Current Discharge Medication List      START taking these medications    Details   oxyCODONE-acetaminophen (PERCOCET) 5-325 mg per tablet Take 1 Tab by mouth every six (6) hours as needed for Pain for up to 3 days. Max Daily Amount: 4 Tabs. Qty: 10 Tab, Refills: 0               ED COURSE: The patients hospital course has been uncomplicated. DISCHARGE NOTE:  5:33 PM  The care plan has been outline with the patient and/or family, who verbally conveyed understanding and agreement. Available results have been reviewed. Patient and/or family understand the follow up plan as outlined and discharge instructions. Should their condition deterioration at any time after discharge the patient agrees to return, follow up sooner than outlined or seek medical assistance at the closest Emergency Room as soon as possible. Questions have been answered.  Discharge instructions and educational information regarding the patient's diagnosis as well a list of reasons why the patient would want to seek immediate medical attention, should their condition change, were reviewed directly with the patient/family         This note will not be viewable in [facility]

## 2017-05-26 NOTE — DISCHARGE INSTRUCTIONS
Thank you for allowing us to provide you with excellent care today. We hope we addressed all of your concerns and needs. We strive to provide excellent quality care in the Emergency Department. Please rate us as excellent, as anything less than excellent does not meet our expectations. If you feel that you have not received excellent quality care or timely care, please ask to speak to the nurse manager. Please choose us in the future for your continued health care needs. The exam and treatment you received in the Emergency Department were for an urgent problem and are not intended as complete care. It is important that you follow-up with a doctor, nurse practitioner, or physician assistant to:  (1) confirm your diagnosis,  (2) re-evaluation of changes in your illness and treatment, and  (3) for ongoing care. If your symptoms become worse or you do not improve as expected and you are unable to reach your usual health care provider, you should return to the Emergency Department. We are available 24 hours a day. Take this sheet with you when you go to your follow-up visit. If you have any problem arranging the follow-up visit, contact [address] [place] [address] 21 [phone])    Make an appointment with your Primary Care doctor for follow up of this visit. Return to the ER if you are unable to be seen in the time recommended on your discharge instructions. Abdominal Pain: Care Instructions  Your Care Instructions    Abdominal pain has many possible causes. Some aren't serious and get better on their own in a few days. Others need more testing and treatment. If your pain continues or gets worse, you need to be rechecked and may need more tests to find out what is wrong. You may need surgery to correct the problem. Don't ignore new symptoms, such as fever, nausea and vomiting, urination problems, pain that gets worse, and dizziness. These may be signs of a more serious problem.   Your doctor may have recommended a follow-up visit in the next 8 to 12 hours. If you are not getting better, you may need more tests or treatment. The doctor has checked you carefully, but problems can develop later. If you notice any problems or new symptoms, get medical treatment right away. Follow-up care is a key part of your treatment and safety. Be sure to make and go to all appointments, and call your doctor if you are having problems. It's also a good idea to know your test results and keep a list of the medicines you take. How can you care for yourself at home? · Rest until you feel better. · To prevent dehydration, drink plenty of fluids, enough so that your urine is light yellow or clear like water. Choose water and other caffeine-free clear liquids until you feel better. If you have kidney, heart, or liver disease and have to limit fluids, talk with your doctor before you increase the amount of fluids you drink. · If your stomach is upset, eat mild foods, such as rice, dry toast or crackers, bananas, and applesauce. Try eating several small meals instead of two or three large ones. · Wait until 48 hours after all symptoms have gone away before you have spicy foods, alcohol, and drinks that contain caffeine. · Do not eat foods that are high in fat. · Avoid anti-inflammatory medicines such as aspirin, ibuprofen (Advil, Motrin), and naproxen (Aleve). These can cause stomach upset. Talk to your doctor if you take daily aspirin for another health problem. When should you call for help? Call 911 anytime you think you may need emergency care. For example, call if:  · You passed out (lost consciousness). · You pass maroon or very bloody stools. · You vomit blood or what looks like coffee grounds. · You have new, severe belly pain. Call your doctor now or seek immediate medical care if:  · Your pain gets worse, especially if it becomes focused in one area of your belly. · You have a new or higher fever.   · Your stools are black and look like tar, or they have streaks of blood. · You have unexpected vaginal bleeding. · You have symptoms of a urinary tract infection. These may include:  ¨ Pain when you urinate. ¨ Urinating more often than usual.  ¨ Blood in your urine. · You are dizzy or lightheaded, or you feel like you may faint. Watch closely for changes in your health, and be sure to contact your doctor if:  · You are not getting better after 1 day (24 hours). Where can you learn more? Go to http://luna-ernesto.info/. Enter L641 in the search box to learn more about \"Abdominal Pain: Care Instructions. \"  Current as of: May 27, 2016  Content Version: 11.2  © 6895-7778 [employer]. Care instructions adapted under license by [employer] (which disclaims liability or warranty for this information). If you have questions about a medical condition or this instruction, always ask your healthcare professional. [place] [address] any warranty or liability for your use of this information.

## 2017-05-26 NOTE — LETTER
[address] 
[facility] EMERGENCY DEPT 
[address] [place] [address] [address] [id number] [phone] Work/School Note Date: 5/26/2017 To Whom It May concern: 
 
Delwyn Sandifer was seen and treated today in the emergency room by the following provider(s): 
Attending Provider: Galo Merrill MD 
Physician Assistant: Ayo Mae PA-C. Delwyn Sandifer may return to work on 29 May 2017 or sooner. Sincerely, Ayo Mae PA-C

## 2017-05-26 NOTE — ED NOTES
Patient arrives for lower abd pain that started Monday. Patient reports that she has frequent UTI's, was [facility] this morning who said her urine test was negative for UTI. Patient was sent here for RLQ pain. Denies fevers, n/v/d. Denies urinary s/sx, vaginal discharge, bleeding. No distress noted. Will continue to monitor.

## 2017-05-26 NOTE — UC NOTE
After being seen by provider pt advised to be evaluated in the emergency department.  Report called to [facility] by Dr Víctor Osorio

## 2017-05-26 NOTE — UC PROVIDER NOTE
Patient is a 28 y.o. female presenting with abdominal pain. The history is provided by the patient. Abdominal Pain    This is a new problem. Episode onset: 5 days ago; home UTI test which was + for UTI; took friends left over cephalexin w/o improvement of abdominal pain. The problem occurs constantly. The problem has been gradually worsening. The pain is located in the RLQ. The quality of the pain is aching. The pain is at a severity of 8/10. Associated symptoms include dysuria and frequency. Pertinent negatives include no fever, no nausea, no vomiting, no hematuria, no headaches, no trauma and no chest pain. Past Medical History:   Diagnosis Date    Bartholin cyst     Infectious disease 2012    MRSA in left and right axillary        History reviewed. No pertinent surgical history. Family History   Problem Relation Age of Onset    No Known Problems Mother     No Known Problems Father     Psoriasis Sister     Bipolar Disorder Brother     Schizophrenia Brother     Heart Disease Maternal Uncle     Heart Disease Maternal Grandmother     Heart Disease Maternal Grandfather         Social History     Social History    Marital status: SINGLE     Spouse name: N/A    Number of children: N/A    Years of education: N/A     Occupational History    Not on file. Social History Main Topics    Smoking status: Current Every Day Smoker     Packs/day: 1.00     Years: 15.00    Smokeless tobacco: Never Used    Alcohol use 3.5 oz/week     7 Shots of liquor per week      Comment: 2-3/week    Drug use: No    Sexual activity: Yes     Birth control/ protection: Condom, IUD     Other Topics Concern    Not on file     Social History Narrative                ALLERGIES: Review of patient's allergies indicates no known allergies. Review of Systems   Constitutional: Negative for chills and fever. Respiratory: Negative for shortness of breath and wheezing.     Cardiovascular: Negative for chest pain and palpitations. Gastrointestinal: Positive for abdominal pain. Negative for nausea and vomiting. Genitourinary: Positive for dysuria and frequency. Negative for hematuria. Skin: Negative for rash. Neurological: Negative for dizziness and headaches. Vitals:    05/26/17 1231 05/26/17 1232   BP:  (!) 133/96   Pulse:  94   Resp:  16   Temp:  98 °F (36.7 °C)   SpO2:  95%   Weight: 58.1 kg (128 lb)    Height: 5' 3\" (1.6 m)        Physical Exam   Constitutional: She appears well-developed and well-nourished. She appears distressed. Cardiovascular: Normal rate, regular rhythm and normal heart sounds. Pulmonary/Chest: Effort normal and breath sounds normal. No respiratory distress. She has no wheezes. She has no rales. Abdominal: Soft. She exhibits no distension. There is tenderness in the right lower quadrant. There is rebound and guarding. There is no rigidity, no CVA tenderness, no tenderness at McBurney's point and negative David's sign. Neurological: She is alert. Skin: She is not diaphoretic. Psychiatric: She has a normal mood and affect. Her behavior is normal. Judgment and thought content normal.   Nursing note and vitals reviewed. MDM     Differential Diagnosis; Clinical Impression; Plan:     CLINICAL IMPRESSION:  Abdominal pain, right lower quadrant  (primary encounter diagnosis)    Plan:  1. Referred to ER for r/o appendicitis    Amount and/or Complexity of Data Reviewed:   Clinical lab tests:  Ordered and reviewed  Risk of Significant Complications, Morbidity, and/or Mortality:   Presenting problems: Moderate  Diagnostic procedures: Moderate  Management options:   Moderate      Procedures

## 2017-05-28 LAB
BACTERIA SPEC CULT: ABNORMAL
BACTERIA SPEC CULT: ABNORMAL
CC UR VC: ABNORMAL
SERVICE CMNT-IMP: ABNORMAL

## 2017-05-28 RX ORDER — FLUCONAZOLE 150 MG/1
150 TABLET ORAL
Qty: 1 TAB | Refills: 0 | Status: SHIPPED | OUTPATIENT
Start: 2017-05-28 | End: 2017-05-28

## 2017-05-30 LAB
C TRACH DNA SPEC QL NAA+PROBE: NEGATIVE
N GONORRHOEA DNA SPEC QL NAA+PROBE: NEGATIVE
SAMPLE TYPE: NORMAL
SERVICE CMNT-IMP: NORMAL
SPECIMEN SOURCE: NORMAL

## 2017-05-31 NOTE — UC NOTE
Pt returned call, results given. Pt is aware Diflucan called to [facility].

## 2017-11-17 ENCOUNTER — OFFICE VISIT (OUTPATIENT)
Dept: INTERNAL MEDICINE CLINIC | Age: 32
End: 2017-11-17

## 2017-11-17 VITALS
HEIGHT: 63 IN | HEART RATE: 91 BPM | RESPIRATION RATE: 16 BRPM | DIASTOLIC BLOOD PRESSURE: 73 MMHG | BODY MASS INDEX: 22.86 KG/M2 | WEIGHT: 129 LBS | SYSTOLIC BLOOD PRESSURE: 104 MMHG | OXYGEN SATURATION: 96 % | TEMPERATURE: 97.7 F

## 2017-11-17 DIAGNOSIS — K02.9 DENTAL CARIES: ICD-10-CM

## 2017-11-17 DIAGNOSIS — K08.89 PAIN, DENTAL: Primary | ICD-10-CM

## 2017-11-17 DIAGNOSIS — Z12.4 SCREENING FOR CERVICAL CANCER: ICD-10-CM

## 2017-11-17 RX ORDER — IBUPROFEN 800 MG/1
800 TABLET ORAL
Qty: 21 TAB | Refills: 0 | Status: SHIPPED | OUTPATIENT
Start: 2017-11-17 | End: 2019-06-12

## 2017-11-17 RX ORDER — AMOXICILLIN 500 MG/1
500 CAPSULE ORAL 2 TIMES DAILY
Qty: 14 CAP | Refills: 0 | Status: SHIPPED | OUTPATIENT
Start: 2017-11-17 | End: 2017-11-24

## 2017-11-17 NOTE — PROGRESS NOTES
Reviewed record  In preparation for visit and have obtained necessary documentation. 1. Have you been to the ER, urgent care clinic since your last visit? Hospitalized since your last visit?no  2. Have you seen or consulted any other health care providers outside of the [facility] since your last visit? Include any pap smears or colon screening. no  Advanced directives: Patient declines information on advanced directives. Patients vital signs discussed with physician.

## 2017-11-17 NOTE — PROGRESS NOTES
CC:   Chief Complaint   Patient presents with    Dental Pain       HISTORY OF PRESENT ILLNESS  Greg Ogden is a 28 y.o. female. Patient complains of tooth pain at left lower wisdom tooth area; pain is 6/10, constant. Saw a dentist at [facility] 4 months ago and was told that her wisdom teeth needed to be removed. Was prescribed amoxicillin that helped. Pain has now come back. She does not have dental insurance and wants to save money for a few months before she returns to [facility]. Denies fevers, chills    PMH: She has chronic insomnia, tobacco use, and alcohol dependence.      Soc Hx  Engaged. No children. 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 She has no health insurance. Smokes 1 ppd. Drinks a glass of Vodka about 4 times a week. Used to smoke marijuana regularly in her late teens. No recreational drug use in over 10 years. Does not get regular exercise. Denies drinking coffee, tea, sodas, or energy drinks. Drinks mostly water. Patient Active Problem List   Diagnosis Code    Hypomagnesemia E83.42    Folate deficiency E53.8    Ingrown nail L60.0    Macrocytosis without anemia D75.89    Hypokalemia E87.6    Alcohol abuse F10.10     Past Medical History:   Diagnosis Date    Bartholin cyst     Infectious disease 2012    MRSA in left and right axillary     No Known Allergies  Current Outpatient Prescriptions   Medication Sig Dispense Refill    traZODone (DESYREL) 100 mg tablet Take 1 Tab by mouth nightly. 30 Tab 11    levonorgestrel (MIRENA) 20 mcg/24 hr (5 years) IUD 1 Each by IntraUTERine route once. PHYSICAL EXAM  Visit Vitals    /73 (BP 1 Location: Left arm, BP Patient Position: Sitting)    Pulse 91    Temp 97.7 °F (36.5 °C) (Oral)    Resp 16    Ht 5' 3\" (1.6 m)    Wt 129 lb (58.5 kg)    SpO2 96%    BMI 22.85 kg/m2       General: Well-developed and well-nourished, no distress.   HEENT:  Head normocephalic/atraumatic, no scleral icterus. Poor dentition at lower left wisdom tooth area. Neurological: Alert and oriented. Psychiatric: Normal mood and affect. Behavior is normal.         ASSESSMENT AND PLAN    ICD-10-CM ICD-9-CM    1. Pain, dental K08.89 525.9    2. Dental caries K02.9 521.00 amoxicillin (AMOXIL) 500 mg capsule   3. Screening for cervical cancer Z12.4 V76.2 REFERRAL TO GYNECOLOGY     Diagnoses and all orders for this visit:    1. Pain, dental  -     Start ibuprofen (MOTRIN) 800 mg tablet; Take 1 Tab by mouth every eight (8) hours as needed for Pain. 2. Dental caries  -     Start amoxicillin (AMOXIL) 500 mg capsule; Take 1 Cap by mouth two (2) times a day for 7 days. 3. Screening for cervical cancer  -     REFERRAL TO GYNECOLOGY      Follow-up Disposition:  Return if symptoms worsen or fail to improve. Provided patient and/or family with advanced directive information and answered pertinent questions. Encouraged patient to provide a copy of advanced directive to the office when available. I have discussed the diagnosis with the patient and the intended plan as seen in the above orders. Patient is in agreement. The patient has received an after-visit summary and questions were answered concerning future plans. I have discussed medication side effects and warnings with the patient as well.

## 2018-04-04 ENCOUNTER — HOSPITAL ENCOUNTER (EMERGENCY)
Age: 33
Discharge: HOME | End: 2018-04-04
Attending: EMERGENCY MEDICINE | Admitting: EMERGENCY MEDICINE
Payer: COMMERCIAL

## 2018-04-04 VITALS
SYSTOLIC BLOOD PRESSURE: 119 MMHG | TEMPERATURE: 98.9 F | HEART RATE: 87 BPM | BODY MASS INDEX: 23.36 KG/M2 | OXYGEN SATURATION: 98 % | RESPIRATION RATE: 16 BRPM | WEIGHT: 131.84 LBS | HEIGHT: 63 IN | DIASTOLIC BLOOD PRESSURE: 94 MMHG

## 2018-04-04 DIAGNOSIS — N76.0 ABSCESS, VAGINA: Primary | ICD-10-CM

## 2018-04-04 PROCEDURE — 99282 EMERGENCY DEPT VISIT SF MDM: CPT

## 2018-04-04 RX ORDER — SULFAMETHOXAZOLE AND TRIMETHOPRIM 800; 160 MG/1; MG/1
1 TABLET ORAL 2 TIMES DAILY
Qty: 20 TAB | Refills: 0 | Status: SHIPPED | OUTPATIENT
Start: 2018-04-04 | End: 2018-04-14

## 2018-04-04 RX ORDER — BUPIVACAINE HYDROCHLORIDE 5 MG/ML
5 INJECTION, SOLUTION EPIDURAL; INTRACAUDAL ONCE
Status: DISCONTINUED | OUTPATIENT
Start: 2018-04-04 | End: 2018-04-04 | Stop reason: HOSPADM

## 2018-04-04 RX ORDER — LIDOCAINE HYDROCHLORIDE AND EPINEPHRINE 10; 10 MG/ML; UG/ML
5 INJECTION, SOLUTION INFILTRATION; PERINEURAL ONCE
Status: DISCONTINUED | OUTPATIENT
Start: 2018-04-04 | End: 2018-04-04 | Stop reason: HOSPADM

## 2018-04-04 RX ORDER — CEPHALEXIN 500 MG/1
500 CAPSULE ORAL 4 TIMES DAILY
Qty: 28 CAP | Refills: 0 | Status: SHIPPED | OUTPATIENT
Start: 2018-04-04 | End: 2018-04-11

## 2018-04-04 NOTE — ED PROVIDER NOTES
EMERGENCY DEPARTMENT HISTORY AND PHYSICAL EXAM      Date: 4/4/2018  Patient Name: Dianna Valenzuela     I have seen and evaluated this patient in the Express [facility] portion of triage for abscess. The patients care will begin now and orders have been placed. This patient will be seen and provided further care in the Emergency Room. Written by Amber Gonzales, a scribe for ARELI Ortiz.    History of Presenting Illness     Chief Complaint   Patient presents with    Abscess     pt c/o abscess to vagina x3-4 days. History Provided By: Patient    HPI: Dianna Valenzuela, 35 y.o. female with PMHx significant for bartholin cysts and MRSA in left and right axillary, presents ambulatory to the ED with cc of worsening painful area of redness and swelling to the vagina x 3-4 days. She reports hx of Bartholin cysts, which she has had incised and drained, and states current sx's are consistent. Pt states the shaves the area, however does not do so often and does not exfoliate prior to. She endorses applying heat to the area last night with little relief of sx's. Pt denies concern for STI. She specifically denies any fever and chills. PCP: Veronica Staley MD    There are no other complaints, changes, or physical findings at this time. Current Facility-Administered Medications   Medication Dose Route Frequency Provider Last Rate Last Dose    lidocaine-EPINEPHrine (XYLOCAINE) 1 %-1:100,000 injection 50 mg  5 mL SubCUTAneous ONCE Toyin Jefferson, [address]        bupivacaine (PF) (MARCAINE) 0.5 % (5 mg/mL) injection 25 mg  5 mL Infiltration ONCE JAY Pierce         Current Outpatient Prescriptions   Medication Sig Dispense Refill    cephALEXin (KEFLEX) 500 mg capsule Take 1 Cap by mouth four (4) times daily for 7 days. 28 Cap 0    trimethoprim-sulfamethoxazole (BACTRIM DS) 160-800 mg per tablet Take 1 Tab by mouth two (2) times a day for 10 days.  20 Tab 0    ibuprofen (MOTRIN) 800 mg tablet Take 1 Tab by mouth every eight (8) hours as needed for Pain. 21 Tab 0    traZODone (DESYREL) 100 mg tablet Take 1 Tab by mouth nightly. 30 Tab 11    levonorgestrel (MIRENA) 20 mcg/24 hr (5 years) IUD 1 Each by IntraUTERine route once. Past History     Past Medical History:  Past Medical History:   Diagnosis Date    Bartholin cyst     Infectious disease 2012    MRSA in left and right axillary       Past Surgical History:  No past surgical history on file. Family History:  Family History   Problem Relation Age of Onset    No Known Problems Mother     No Known Problems Father     Psoriasis Sister     Bipolar Disorder Brother     Schizophrenia Brother     Heart Disease Maternal Uncle     Heart Disease Maternal Grandmother     Heart Disease Maternal Grandfather        Social History:  Social History   Substance Use Topics    Smoking status: Current Every Day Smoker     Packs/day: 1.00     Years: 15.00    Smokeless tobacco: Never Used    Alcohol use 3.5 oz/week     7 Shots of liquor per week      Comment: 2-3/week       Allergies:  No Known Allergies      Review of Systems   Review of Systems   Constitutional: Negative for chills and fever. Respiratory: Negative for cough and shortness of breath. Cardiovascular: Negative for chest pain. Gastrointestinal: Negative for constipation, diarrhea, nausea and vomiting. Skin:        + painful area of redness and swelling to the vagina   Neurological: Negative for weakness and numbness. All other systems reviewed and are negative. Physical Exam   Physical Exam   Constitutional: She is oriented to person, place, and time. She appears well-developed and well-nourished. HENT:   Head: Normocephalic and atraumatic. Eyes: Conjunctivae and EOM are normal.   Neck: Normal range of motion. Neck supple. Cardiovascular: Normal rate and regular rhythm. Pulmonary/Chest: Effort normal and breath sounds normal. No respiratory distress. Abdominal: Soft.  She exhibits no distension. There is no tenderness. Genitourinary:   Genitourinary Comments: Left vaginal introitus there is a small erythematous lesion ~7mm in diameter that is tender to palpation, it is not fluctuant or indurated. Musculoskeletal: Normal range of motion. Neurological: She is alert and oriented to person, place, and time. Skin: Skin is warm and dry. Psychiatric: She has a normal mood and affect. Nursing note and vitals reviewed. Medical Decision Making   I am the first provider for this patient. I reviewed the vital signs, available nursing notes, past medical history, past surgical history, family history and social history. Vital Signs-Reviewed the patient's vital signs. Patient Vitals for the past 12 hrs:   Temp Pulse Resp BP SpO2   04/04/18 1100 98.9 °F (37.2 °C) 87 16 (!) 119/94 98 %       Records Reviewed: Nursing Notes and Old Medical Records    Provider Notes (Medical Decision Making):   Patient presents with small erythematous but not fluctuant  lesion concerning for abscess vs. Bartholin cyst. No signs of sepsis at this time. So small that no need for I+D; will send home with antibiotics and give abscess management and prevention instructions. ED Course:   Initial assessment performed. The patients presenting problems have been discussed, and they are in agreement with the care plan formulated and outlined with them. I have encouraged them to ask questions as they arise throughout their visit. Disposition:    DISCHARGE NOTE:  11:30 AM  The patient is ready for discharge. The patients signs, symptoms, diagnosis, and instructions for discharge have been discussed and the pt has conveyed their understanding. The patient is to follow up as recommended with OB/GYN or return to the ER should their symptoms worsen. Plan has been discussed and patient has conveyed their agreement. PLAN:  1.  Discharge home  Current Discharge Medication List      START taking these medications    Details   cephALEXin (KEFLEX) 500 mg capsule Take 1 Cap by mouth four (4) times daily for 7 days. Qty: 28 Cap, Refills: 0      trimethoprim-sulfamethoxazole (BACTRIM DS) 160-800 mg per tablet Take 1 Tab by mouth two (2) times a day for 10 days. Qty: 20 Tab, Refills: 0           2. Follow-up Information     Follow up With Details Comments Contact Info    [facility] EMERGENCY DEPT  If symptoms worsen [address] [place] [address]  [phone]    Gynecology if continue to get these           Return to ED if worse     Diagnosis     Clinical Impression:   1. Abscess, vagina        Attestations: This note is prepared by Harmony Camacho, acting as Scribe for Lisa Solano M.D. Lisa Solano M.D: The scribe's documentation has been prepared under my direction and personally reviewed by me in its entirety. I confirm that the note above accurately reflects all work, treatment, procedures, and medical decision making performed by me. This note will not be viewable in [address]

## 2018-07-24 DIAGNOSIS — F51.04 CHRONIC INSOMNIA: ICD-10-CM

## 2018-07-24 NOTE — TELEPHONE ENCOUNTER
Per Valerie:    Dr. Mart Sour / refill  Received:  Today       Becky Britt-Antonio REIS [facility] Front Office                     Patient is completely out of medication Rx\"Trazodone 100 mg .  Has scheduled appointment for  08/10/18 at 2:20 pm.  Send to [facility] [phone]     Best contact [phone]

## 2018-07-25 RX ORDER — TRAZODONE HYDROCHLORIDE 100 MG/1
100 TABLET ORAL
Qty: 30 TAB | Refills: 0 | Status: SHIPPED | OUTPATIENT
Start: 2018-07-25 | End: 2021-01-04 | Stop reason: SDUPTHER

## 2018-07-25 NOTE — TELEPHONE ENCOUNTER
REFILL     PCP: Fernie Gamble MD     Last appt: Visit date not found   Future Appointments  Date Time Provider Henry Read   8/10/2018 2:20 PM Fernie Gamble MD [facility]        Requested Prescriptions     Pending Prescriptions Disp Refills    traZODone (DESYREL) 100 mg tablet 30 Tab 11     Sig: Take 1 Tab by mouth nightly.

## 2019-06-11 ENCOUNTER — APPOINTMENT (OUTPATIENT)
Dept: GENERAL RADIOLOGY | Age: 34
End: 2019-06-11
Payer: COMMERCIAL

## 2019-06-11 ENCOUNTER — APPOINTMENT (OUTPATIENT)
Dept: CT IMAGING | Age: 34
End: 2019-06-11
Payer: COMMERCIAL

## 2019-06-11 ENCOUNTER — HOSPITAL ENCOUNTER (EMERGENCY)
Age: 34
Discharge: HOME | End: 2019-06-12
Attending: EMERGENCY MEDICINE
Payer: COMMERCIAL

## 2019-06-11 DIAGNOSIS — T07.XXXA ABRASIONS OF MULTIPLE SITES: ICD-10-CM

## 2019-06-11 DIAGNOSIS — S52.022A OLECRANON FRACTURE, LEFT, CLOSED, INITIAL ENCOUNTER: Primary | ICD-10-CM
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
## 2019-06-11 LAB — HCG UR QL: NEGATIVE

## 2019-06-11 PROCEDURE — 81025 URINE PREGNANCY TEST: CPT

## 2019-06-11 PROCEDURE — 73080 X-RAY EXAM OF ELBOW: CPT

## 2019-06-11 PROCEDURE — 74011000250 HC RX REV CODE- 250: Performed by: EMERGENCY MEDICINE

## 2019-06-11 PROCEDURE — 74011250637 HC RX REV CODE- 250/637: Performed by: PHYSICIAN ASSISTANT

## 2019-06-11 PROCEDURE — 74011250636 HC RX REV CODE- 250/636: Performed by: EMERGENCY MEDICINE

## 2019-06-11 PROCEDURE — 75810000053 HC SPLINT APPLICATION

## 2019-06-11 PROCEDURE — 99284 EMERGENCY DEPT VISIT MOD MDM: CPT

## 2019-06-11 PROCEDURE — 70450 CT HEAD/BRAIN W/O DYE: CPT

## 2019-06-11 PROCEDURE — 96374 THER/PROPH/DIAG INJ IV PUSH: CPT

## 2019-06-11 PROCEDURE — 99285 EMERGENCY DEPT VISIT HI MDM: CPT

## 2019-06-11 PROCEDURE — 73030 X-RAY EXAM OF SHOULDER: CPT

## 2019-06-11 RX ORDER — SODIUM CHLORIDE 0.9 % (FLUSH) 0.9 %
5-40 SYRINGE (ML) INJECTION AS NEEDED
Status: DISCONTINUED | OUTPATIENT
Start: 2019-06-11 | End: 2019-06-12 | Stop reason: HOSPADM

## 2019-06-11 RX ORDER — SODIUM CHLORIDE 0.9 % (FLUSH) 0.9 %
5-40 SYRINGE (ML) INJECTION EVERY 8 HOURS
Status: DISCONTINUED | OUTPATIENT
Start: 2019-06-11 | End: 2019-06-12 | Stop reason: HOSPADM

## 2019-06-11 RX ORDER — OXYCODONE AND ACETAMINOPHEN 5; 325 MG/1; MG/1
1 TABLET ORAL
Status: COMPLETED | OUTPATIENT
Start: 2019-06-11 | End: 2019-06-11

## 2019-06-11 RX ORDER — FENTANYL CITRATE 50 UG/ML
50 INJECTION, SOLUTION INTRAMUSCULAR; INTRAVENOUS
Status: COMPLETED | OUTPATIENT
Start: 2019-06-11 | End: 2019-06-11

## 2019-06-11 RX ORDER — ONDANSETRON 4 MG/1
4 TABLET, ORALLY DISINTEGRATING ORAL
Status: COMPLETED | OUTPATIENT
Start: 2019-06-11 | End: 2019-06-11

## 2019-06-11 RX ADMIN — Medication 10 ML: at 23:48

## 2019-06-11 RX ADMIN — OXYCODONE HYDROCHLORIDE AND ACETAMINOPHEN 1 TABLET: 5; 325 TABLET ORAL at 20:08

## 2019-06-11 RX ADMIN — BACITRACIN ZINC, NEOMYCIN SULFATE, POLYMYXIN B SULFATE 1 PACKET: 3.5; 5000; 4 OINTMENT TOPICAL at 23:48

## 2019-06-11 RX ADMIN — ONDANSETRON 4 MG: 4 TABLET, ORALLY DISINTEGRATING ORAL at 20:08

## 2019-06-11 RX ADMIN — FENTANYL CITRATE 50 MCG: 50 INJECTION, SOLUTION INTRAMUSCULAR; INTRAVENOUS at 23:46

## 2019-06-12 VITALS
SYSTOLIC BLOOD PRESSURE: 134 MMHG | RESPIRATION RATE: 16 BRPM | DIASTOLIC BLOOD PRESSURE: 96 MMHG | OXYGEN SATURATION: 98 % | WEIGHT: 126.54 LBS | BODY MASS INDEX: 22.42 KG/M2 | HEART RATE: 91 BPM

## 2019-06-12 PROCEDURE — 75810000053 HC SPLINT APPLICATION

## 2019-06-12 RX ORDER — IBUPROFEN 600 MG/1
600 TABLET ORAL
Qty: 30 TAB | Refills: 0 | Status: SHIPPED | OUTPATIENT
Start: 2019-06-12 | End: 2020-03-23

## 2019-06-12 RX ORDER — OXYCODONE AND ACETAMINOPHEN 5; 325 MG/1; MG/1
1 TABLET ORAL
Qty: 20 TAB | Refills: 0 | Status: SHIPPED | OUTPATIENT
Start: 2019-06-12 | End: 2019-06-17

## 2019-06-12 NOTE — DISCHARGE INSTRUCTIONS
Patient Education        Broken Arm: Care Instructions  Your Care Instructions  Fractures can range from a small, hairline crack, to a bone or bones broken into two or more pieces. Your treatment depends on how bad the break is. Your doctor may have put your arm in a splint or cast to allow it to heal or to keep it stable until you see another doctor. It may take weeks or months for your arm to heal. You can help your arm heal with some care at home. You heal best when you take good care of yourself. Eat a variety of healthy foods, and don't smoke. You may have had a sedative to help you relax. You may be unsteady after having sedation. It can take a few hours for the medicine's effects to wear off. Common side effects of sedation include nausea, vomiting, and feeling sleepy or tired. The doctor has checked you carefully, but problems can develop later. If you notice any problems or new symptoms, get medical treatment right away. Follow-up care is a key part of your treatment and safety. Be sure to make and go to all appointments, and call your doctor if you are having problems. It's also a good idea to know your test results and keep a list of the medicines you take. How can you care for yourself at home? · If the doctor gave you a sedative:  ? For 24 hours, don't do anything that requires attention to detail. It takes time for the medicine's effects to completely wear off.  ? For your safety, do not drive or operate any machinery that could be dangerous. Wait until the medicine wears off and you can think clearly and react easily. · Put ice or a cold pack on your arm for 10 to 20 minutes at a time. Try to do this every 1 to 2 hours for the next 3 days (when you are awake). Put a thin cloth between the ice and your cast or splint. Keep the cast or splint dry. · Follow the cast care instructions your doctor gives you. If you have a splint, do not take it off unless your doctor tells you to.   · Be safe with medicines. Take pain medicines exactly as directed. ? If the doctor gave you a prescription medicine for pain, take it as prescribed. ? If you are not taking a prescription pain medicine, ask your doctor if you can take an over-the-counter medicine. · Prop up your arm on pillows when you sit or lie down in the first few days after the injury. Keep the arm higher than the level of your heart. This will help reduce swelling. · Follow instructions for exercises to keep your arm strong. · Wiggle your fingers and wrist often to reduce swelling and stiffness. When should you call for help? Call 911 anytime you think you may need emergency care. For example, call if:    · You are very sleepy and you have trouble waking up.    Call your doctor now or seek immediate medical care if:    · You have new or worse nausea or vomiting.     · You have new or worse pain.     · Your hand or fingers are cool or pale or change color.     · Your cast or splint feels too tight.     · You have tingling, weakness, or numbness in your hand or fingers.    Watch closely for changes in your health, and be sure to contact your doctor if:    · You do not get better as expected.     · You have problems with your cast or splint. Where can you learn more? Go to http://luna-ernesto.info/. Enter E208 in the search box to learn more about \"Broken Arm: Care Instructions. \"  Current as of: September 20, 2018  Content Version: 11.9  © 1864-5315 [employer]. Care instructions adapted under license by [employer] (which disclaims liability or warranty for this information). If you have questions about a medical condition or this instruction, always ask your healthcare professional. [place] [address] any warranty or liability for your use of this information.

## 2019-06-12 NOTE — ED NOTES
Pt medicated per MAR. Pts wound cleansed and dressed per MD. Splinting cart outside of room. Mother bedside at this time. Pt on monitors x 2.
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
## 2019-06-18 ENCOUNTER — OFFICE VISIT (OUTPATIENT)
Dept: URGENT CARE | Age: 34
End: 2019-06-18

## 2019-06-18 VITALS
RESPIRATION RATE: 20 BRPM | HEART RATE: 99 BPM | HEIGHT: 63 IN | BODY MASS INDEX: 22.32 KG/M2 | SYSTOLIC BLOOD PRESSURE: 98 MMHG | TEMPERATURE: 98.3 F | DIASTOLIC BLOOD PRESSURE: 59 MMHG | OXYGEN SATURATION: 99 % | WEIGHT: 126 LBS

## 2019-06-18 DIAGNOSIS — S80.00XA CONTUSION OF KNEE, UNSPECIFIED LATERALITY, INITIAL ENCOUNTER: Primary | ICD-10-CM

## 2019-06-18 DIAGNOSIS — S90.30XA CONTUSION OF FOOT, UNSPECIFIED LATERALITY, INITIAL ENCOUNTER: ICD-10-CM

## 2019-06-18 DIAGNOSIS — R60.0 BILATERAL LOWER EXTREMITY EDEMA: ICD-10-CM

## 2019-06-18 DIAGNOSIS — S90.00XA CONTUSION OF ANKLE, UNSPECIFIED LATERALITY, INITIAL ENCOUNTER: ICD-10-CM

## 2019-06-18 LAB
ANION GAP BLD CALC-SCNC: 16 MMOL/L
BUN BLD-MCNC: 10 MG/DL
CHLORIDE BLD-SCNC: 101 MMOL/L
CO2 BLD-SCNC: 31 MMOL/L
CREAT BLD-MCNC: 0.9 MG/DL (ref 0.6–1.3)
GLUCOSE BLD STRIP.AUTO-MCNC: 109 MG/DL
HCT VFR BLD CALC: 38 %
HGB BLD-MCNC: 12.9 G/DL
IONIZED CALCIUM ISTA,ICAI: 1.2
POTASSIUM BLD-SCNC: 3.6 MMOL/L
SODIUM BLD-SCNC: 143 MMOL/L

## 2019-06-18 RX ORDER — CEPHALEXIN 250 MG/1
500 CAPSULE ORAL 3 TIMES DAILY
COMMUNITY
End: 2020-03-23

## 2019-06-18 RX ORDER — IBUPROFEN 200 MG
600 TABLET ORAL
Qty: 3 TAB | Refills: 0 | Status: SHIPPED | COMMUNITY
Start: 2019-06-18 | End: 2019-06-18

## 2019-06-18 NOTE — PROGRESS NOTES
HPI  
 
Past Medical History:  
Diagnosis Date  Bartholin cyst   
 Infectious disease 2012 MRSA in left and right axillary No past surgical history on file. Family History Problem Relation Age of Onset  No Known Problems Mother  No Known Problems Father  Psoriasis Sister  Bipolar Disorder Brother  Schizophrenia Brother  Heart Disease Maternal Uncle  Heart Disease Maternal Grandmother  Heart Disease Maternal Grandfather Social History Socioeconomic History  Marital status: SINGLE Spouse name: Not on file  Number of children: Not on file  Years of education: Not on file  Highest education level: Not on file Occupational History  Not on file Social Needs  Financial resource strain: Not on file  Food insecurity:  
  Worry: Not on file Inability: Not on file  Transportation needs:  
  Medical: Not on file Non-medical: Not on file Tobacco Use  Smoking status: Current Every Day Smoker Packs/day: 1.00 Years: 15.00 Pack years: 15.00  Smokeless tobacco: Never Used Substance and Sexual Activity  Alcohol use: Yes Alcohol/week: 3.5 oz Types: 7 Shots of liquor per week Comment: 2-3/week  Drug use: No  
 Sexual activity: Yes Birth control/protection: Condom, IUD Lifestyle  Physical activity:  
  Days per week: Not on file Minutes per session: Not on file  Stress: Not on file Relationships  Social connections:  
  Talks on phone: Not on file Gets together: Not on file Attends [employer] service: Not on file Active member of club or organization: Not on file Attends meetings of clubs or organizations: Not on file Relationship status: Not on file  Intimate partner violence:  
  Fear of current or ex partner: Not on file Emotionally abused: Not on file Physically abused: Not on file Forced sexual activity: Not on file Other Topics Concern  Not on file Social History Narrative  Not on file ALLERGIES: Patient has no known allergies. Review of Systems There were no vitals filed for this visit. Physical Exam 
 
MDM Procedures
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
## 2020-02-06 ENCOUNTER — TELEPHONE (OUTPATIENT)
Dept: INTERNAL MEDICINE CLINIC | Facility: CLINIC | Age: 35
End: 2020-02-06

## 2020-02-06 NOTE — TELEPHONE ENCOUNTER
Called pt back to discuss. Pt is scheduled w/ Dr. Kvng Chavez on 3/10, but is an [facility] pt. [facility] requesting pt to call back to switch to an earlier appt with Dr. Macey Marino.    ----- Message from Lisa Burden sent at 2/6/2020 10:20 AM EST -----  Regarding:  Young/ Telephone  Contact: [phone]  Caller's first and last name and relationship to patient (if not the patient):  Best contact number: [phone]  Preferred date and time: ASAP  Scheduled appointment date and time: Tuesday, March 10, 2020 01:30 PM  Reason for appointment:    Therapy Consult  Details to clarify the request:   Patient has been unemployed for about a month after 20years of working and is requesting to speak with someone

## 2020-03-23 ENCOUNTER — OFFICE VISIT (OUTPATIENT)
Dept: INTERNAL MEDICINE CLINIC | Facility: CLINIC | Age: 35
End: 2020-03-23

## 2020-03-23 VITALS
TEMPERATURE: 99.2 F | DIASTOLIC BLOOD PRESSURE: 84 MMHG | RESPIRATION RATE: 16 BRPM | OXYGEN SATURATION: 95 % | HEART RATE: 118 BPM | BODY MASS INDEX: 22.15 KG/M2 | SYSTOLIC BLOOD PRESSURE: 119 MMHG | WEIGHT: 125 LBS | HEIGHT: 63 IN

## 2020-03-23 DIAGNOSIS — F10.20 UNCOMPLICATED ALCOHOL DEPENDENCE (HCC): ICD-10-CM

## 2020-03-23 DIAGNOSIS — F32.1 CURRENT MODERATE EPISODE OF MAJOR DEPRESSIVE DISORDER WITHOUT PRIOR EPISODE (HCC): Primary | ICD-10-CM

## 2020-03-23 DIAGNOSIS — F41.1 GENERALIZED ANXIETY DISORDER: ICD-10-CM

## 2020-03-23 RX ORDER — SERTRALINE HYDROCHLORIDE 25 MG/1
TABLET, FILM COATED ORAL
Qty: 10 TAB | Refills: 0 | Status: SHIPPED | OUTPATIENT
Start: 2020-03-23 | End: 2020-04-30

## 2020-03-23 RX ORDER — HYDROXYZINE 25 MG/1
25 TABLET, FILM COATED ORAL
Qty: 30 TAB | Refills: 1 | Status: SHIPPED | OUTPATIENT
Start: 2020-03-23 | End: 2021-04-09

## 2020-03-23 RX ORDER — SERTRALINE HYDROCHLORIDE 50 MG/1
50 TABLET, FILM COATED ORAL DAILY
Qty: 30 TAB | Refills: 0 | Status: SHIPPED | OUTPATIENT
Start: 2020-03-23 | End: 2020-04-30

## 2020-03-23 NOTE — PATIENT INSTRUCTIONS
For a therapist and/or psychiatrist, call:    [address] [facility] CSB)-if you have [employer]  Human Services Building   [address] [place], [address]  [address] [facility] [address]  [address] [facility] [address]  [facility], [address]  [phone]  [place], [address]  440 [facility], [address]  [place], [address]  201 [facility], 2305 [facility], 40 [facility]  [phone]    [facility]  100 [facility], [address] [place] [address] [phone]    [facility]  [address]  [phone]      Call [facility] for [address] Ileana Lares at [phone].
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
## 2020-03-23 NOTE — PROGRESS NOTES
Toyin Dolan  Identified pt with two pt identifiers(name and ). Chief Complaint   Patient presents with    Advice Only     Room 3A //     Depression    Anxiety       Reviewed record In preparation for visit and have obtained necessary documentation. 1. Have you been to the ER, urgent care clinic or hospitalized since your last visit? No     2. Have you seen or consulted any other health care providers outside of the [address] [place] [address] since your last visit? Include any pap smears or colon screening. Yes, PAP - [facility] Department     Patient does not have an advance directive and is not interested in receiving information. Vitals reviewed with provider.     Health Maintenance reviewed:     Health Maintenance Due   Topic    Pneumococcal 0-64 years (1 of 1 - PPSV23)    PAP AKA CERVICAL CYTOLOGY           Wt Readings from Last 3 Encounters:   20 125 lb (56.7 kg)   19 126 lb (57.2 kg)   19 126 lb 8.7 oz (57.4 kg)        Temp Readings from Last 3 Encounters:   20 99.2 °F (37.3 °C) (Oral)   19 98.3 °F (36.8 °C)   18 98.9 °F (37.2 °C)        BP Readings from Last 3 Encounters:   20 119/84   19 98/59   19 (!) 134/96        Pulse Readings from Last 3 Encounters:   20 (!) 118   19 99   19 91        Vitals:    20 1350   BP: 119/84   Pulse: (!) 118   Resp: 16   Temp: 99.2 °F (37.3 °C)   TempSrc: Oral   SpO2: 95%   Weight: 125 lb (56.7 kg)   Height: 5' 3\" (1.6 m)   PainSc:   0 - No pain          Learning Assessment:   :       Learning Assessment 2014   PRIMARY LEARNER Patient   HIGHEST LEVEL OF EDUCATION - PRIMARY LEARNER  GRADUATED HIGH SCHOOL OR GED   BARRIERS PRIMARY LEARNER NONE   CO-LEARNER CAREGIVER No   PRIMARY LANGUAGE ENGLISH    NEED No   LEARNER PREFERENCE PRIMARY DEMONSTRATION   ANSWERED BY patient   RELATIONSHIP SELF        Depression Screening:   :       3 most recent PHQ Screens 3/23/2020 Little interest or pleasure in doing things Not at all   Feeling down, depressed, irritable, or hopeless Nearly every day   Total Score PHQ 2 3        Fall Risk Assessment:   :     No flowsheet data found. Abuse Screening:   :     No flowsheet data found.      ADL Screening:   :       ADL Assessment 1/20/2017   Feeding yourself No Help Needed   Getting from bed to chair No Help Needed   Getting dressed No Help Needed   Bathing or showering No Help Needed   Walk across the room (includes cane/walker) No Help Needed   Using the telphone No Help Needed   Taking your medications No Help Needed   Preparing meals No Help Needed   Managing money (expenses/bills) No Help Needed   Moderately strenuous housework (laundry) No Help Needed   Shopping for personal items (toiletries/medicines) No Help Needed   Shopping for groceries No Help Needed   Driving No Help Needed   Climbing a flight of stairs No Help Needed   Getting to places beyond walking distances No Help Needed

## 2020-05-06 ENCOUNTER — VIRTUAL VISIT (OUTPATIENT)
Dept: INTERNAL MEDICINE CLINIC | Facility: CLINIC | Age: 35
End: 2020-05-06

## 2020-05-06 VITALS — BODY MASS INDEX: 22.15 KG/M2 | WEIGHT: 125 LBS | HEIGHT: 63 IN

## 2020-05-06 DIAGNOSIS — F10.10 ALCOHOL ABUSE: ICD-10-CM

## 2020-05-06 DIAGNOSIS — L89.899 PRESSURE INJURY OF SKIN OF OTHER SITE, UNSPECIFIED INJURY STAGE: ICD-10-CM

## 2020-05-06 DIAGNOSIS — F32.1 CURRENT MODERATE EPISODE OF MAJOR DEPRESSIVE DISORDER WITHOUT PRIOR EPISODE (HCC): Primary | ICD-10-CM

## 2020-05-06 DIAGNOSIS — F41.1 GENERALIZED ANXIETY DISORDER: ICD-10-CM

## 2020-05-06 RX ORDER — BUPROPION HYDROCHLORIDE 150 MG/1
TABLET, EXTENDED RELEASE ORAL
Qty: 30 TAB | Refills: 1 | Status: SHIPPED | OUTPATIENT
Start: 2020-05-06 | End: 2021-04-09

## 2020-05-06 NOTE — PROGRESS NOTES
Toyin Dolan  Identified pt with two pt identifiers(name and ). Chief Complaint   Patient presents with    Anxiety    Depression    Decubitus Ulcer     Bruises on the back of the legs, back, mid section (wrapped all the way around) // Noticed about 2 days ago // Back of the legs are sore and there is some back pain       Reviewed record In preparation for visit and have obtained necessary documentation. 1. Have you been to the ER, urgent care clinic or hospitalized since your last visit? No     2. Have you seen or consulted any other health care providers outside of the [address] [place] [address] since your last visit? Include any pap smears or colon screening. Yes. PAP - [address] Dept. Patient does not have an advance directive. Vitals reviewed with provider.     Health Maintenance reviewed:     Health Maintenance Due   Topic    Pneumococcal 0-64 years (1 of 1 - PPSV23)    PAP AKA CERVICAL CYTOLOGY           Wt Readings from Last 3 Encounters:   20 125 lb (56.7 kg)   20 125 lb (56.7 kg)   19 126 lb (57.2 kg)        Temp Readings from Last 3 Encounters:   20 99.2 °F (37.3 °C) (Oral)   19 98.3 °F (36.8 °C)   18 98.9 °F (37.2 °C)        BP Readings from Last 3 Encounters:   20 119/84   19 98/59   19 (!) 134/96        Pulse Readings from Last 3 Encounters:   20 (!) 118   19 99   19 91        Vitals:    20 1324   Weight: 125 lb (56.7 kg)   Height: 5' 3\" (1.6 m)   PainSc:   5   PainLoc: Back          Learning Assessment:   :       Learning Assessment 2014   PRIMARY LEARNER Patient   HIGHEST LEVEL OF EDUCATION - PRIMARY LEARNER  GRADUATED HIGH SCHOOL OR GED   BARRIERS PRIMARY LEARNER NONE   CO-LEARNER CAREGIVER No   PRIMARY LANGUAGE ENGLISH    NEED No   LEARNER PREFERENCE PRIMARY DEMONSTRATION   ANSWERED BY patient   RELATIONSHIP SELF        Depression Screening:   :       3 most recent PHQ Screens 3/23/2020   Little interest or pleasure in doing things Not at all   Feeling down, depressed, irritable, or hopeless Nearly every day   Total Score PHQ 2 3        Fall Risk Assessment:   :     No flowsheet data found. Abuse Screening:   :     No flowsheet data found.      ADL Screening:   :       ADL Assessment 1/20/2017   Feeding yourself No Help Needed   Getting from bed to chair No Help Needed   Getting dressed No Help Needed   Bathing or showering No Help Needed   Walk across the room (includes cane/walker) No Help Needed   Using the telphone No Help Needed   Taking your medications No Help Needed   Preparing meals No Help Needed   Managing money (expenses/bills) No Help Needed   Moderately strenuous housework (laundry) No Help Needed   Shopping for personal items (toiletries/medicines) No Help Needed   Shopping for groceries No Help Needed   Driving No Help Needed   Climbing a flight of stairs No Help Needed   Getting to places beyond walking distances No Help Needed

## 2020-05-06 NOTE — PROGRESS NOTES
Adam Goncalves is a 28 y.o. female evaluated via telephone on 5/6/2020. Consent:  She and/or health care decision maker is aware that she may receive a bill for this telephone service, depending on her insurance coverage, and has provided verbal consent to proceed: Yes      Documentation:  I communicated with the patient and/or health care decision maker about depression and anxiety. Details of this discussion including any medical advice provided: see Assessment and Plan. Chief Complaint   Patient presents with    Anxiety    Depression    Decubitus Ulcer     Bruises on the back of the legs, back, mid section (wrapped all the way around) // Noticed about 2 days ago // Back of the legs are sore and there is some back pain     Presents for 1 month follow up on depression and anxiety. She has depression, anxiety, chronic insomnia, tobacco use, and alcohol dependence. At last clinic visit was started on sertraline and hydroxyzine. Reports she has not been taking sertraline. Talked to family members who spoke badly of sertraline, it also made her feel odd. She did find a therapist as instructed at [address] [place] [address] meeting every 2 weeks but was changed to once weekly. Reports she is still depressed and has loss of interest.  Has good and bad days. On bad days, stays in bed for 1-2 days at a time starring at the TV. Once stayed in bed for 4 days. Anxiety is better, no panic attacks over past week. Takes hydroxyzine at bedtime along with trazodone, sleeps well. Complains of having bruise-like sores on the backs of both legs, low back, beneath breasts wrapping to the mid-back. Her male roommate first noticed it 2 days ago. He told her it looks like bed sores, similar to bed sores his daughter once had. She bought Calmoseptine from the pharmacy as recommended by the pharmacist.     Still drinks alcohol regularly but no longer daily and no longer drinks when she wakes up in the mornings. Went one week with drinking only once. ASSESSMENT AND PLAN  Diagnoses and all orders for this visit:    1. Current moderate episode of major depressive disorder without prior episode ([address]  Stop sertraline since she does not want to take it. -     Start buPROPion SR (WELLBUTRIN SR) 150 mg SR tablet; Take 1/2 tab by mouth daily for first 6 days then 1 tablet daily. 2. Generalized anxiety disorder  -     Start buPROPion SR (WELLBUTRIN SR) 150 mg SR tablet; Take 1/2 tab by mouth daily for first 6 days then 1 tablet daily. 3. Alcohol abuse  Still drinking but has decreased consumption. 4. Pressure injury of skin of other site, unspecified injury stage  Stage unknown so I could not visualize the lesions but sounds like it is pressure injury vs stage 1 decubitus ulcers. Use Calmoseptine and avoid lying in bed all day. I affirm this is a Patient Initiated Episode with a Patient who has not had a related appointment within my department in the past 7 days or scheduled within the next 24 hours.     Total Time: minutes: 11-20 minutes    Note: not billable if this call serves to triage the patient into an appointment for the relevant concern      Ganga Hernandez MD

## 2020-11-24 ENCOUNTER — TELEPHONE (OUTPATIENT)
Dept: INTERNAL MEDICINE CLINIC | Age: 35
End: 2020-11-24

## 2020-11-24 DIAGNOSIS — R20.0 NUMBNESS IN BOTH LEGS: Primary | ICD-10-CM

## 2020-11-24 NOTE — TELEPHONE ENCOUNTER
Pt called to schedule appt now that she finally has medicaid. Pt wanted something soon because she is having leg numbness to the point that it hurts just to put shoes on. Pt stated that she has been exercising and that it making it worse.

## 2020-11-25 ENCOUNTER — TELEPHONE (OUTPATIENT)
Dept: INTERNAL MEDICINE CLINIC | Age: 35
End: 2020-11-25

## 2020-11-25 NOTE — TELEPHONE ENCOUNTER
Verified patients name and date of birth. Patient states she has had numbness and decreased feeling in her legs for 6-9 months. For the last month she is has also experienced pain in her legs.  She would like to get a referral to a neurologist.

## 2020-11-25 NOTE — TELEPHONE ENCOUNTER
Pt saw our number on her caller id. Did not know who called her. Just said for whoever to call her back.

## 2020-11-25 NOTE — TELEPHONE ENCOUNTER
Neurology order placed. You can give her the phone number ([phone]) to schedule an appointment.

## 2020-12-04 ENCOUNTER — TELEPHONE (OUTPATIENT)
Dept: INTERNAL MEDICINE CLINIC | Age: 35
End: 2020-12-04

## 2020-12-04 DIAGNOSIS — F32.1 CURRENT MODERATE EPISODE OF MAJOR DEPRESSIVE DISORDER WITHOUT PRIOR EPISODE (HCC): ICD-10-CM

## 2020-12-04 DIAGNOSIS — F10.10 ALCOHOL ABUSE: ICD-10-CM

## 2020-12-04 DIAGNOSIS — F41.1 GENERALIZED ANXIETY DISORDER: ICD-10-CM

## 2020-12-04 DIAGNOSIS — E53.8 FOLATE DEFICIENCY: Primary | ICD-10-CM

## 2020-12-04 NOTE — TELEPHONE ENCOUNTER
I have not seen any paperwork from [facility] ED. Tell patient that Librium is usually given for only a few days to help with alcohol withdrawal symptoms. It is not meant to be taken any longer than a few days. Therefore, Dr. Lesvia Hi will not refill it. She should have labs done 1 week before her appointment; will recheck potassium level then. Lab orders will be mailed to her.

## 2020-12-04 NOTE — TELEPHONE ENCOUNTER
Pt said she went to  [facility] last Sunday for the numbness she is having, she is scheduled with a neurologist 12/31 and [facility] 12/28. They gave her anxiety medicine and potassium pills she has 3 days left and is asking if Dr Simon Jacobsen can refill until her appt.   [phone]

## 2020-12-04 NOTE — TELEPHONE ENCOUNTER
Verified patients name and date of birth. Patient stated she was seen at [facility] for dehydration, drinking too much alcohol. States she is drinking alcohol daily but has not drank now in 5 days. At [facility] she was given potassium 20 meq (taking once a day) and generic librium 25 mg on a tapered dosage schedule. She has 3 more days left on librium and then will be down to 25 mg daily. She was advised at [facility] EM[facility] to contact her PCP for refill. Please advise if this is possible. Gave patient number to [employer] for help with drinking problem after she was asked if she wanted help.

## 2021-01-04 ENCOUNTER — OFFICE VISIT (OUTPATIENT)
Dept: NEUROLOGY | Age: 36
End: 2021-01-04
Payer: MEDICAID

## 2021-01-04 VITALS
HEART RATE: 100 BPM | OXYGEN SATURATION: 98 % | BODY MASS INDEX: 22.68 KG/M2 | SYSTOLIC BLOOD PRESSURE: 110 MMHG | DIASTOLIC BLOOD PRESSURE: 75 MMHG | WEIGHT: 128 LBS | HEIGHT: 63 IN

## 2021-01-04 DIAGNOSIS — G62.9 NEUROPATHY: Primary | ICD-10-CM

## 2021-01-04 DIAGNOSIS — F51.04 CHRONIC INSOMNIA: ICD-10-CM

## 2021-01-04 PROCEDURE — 99204 OFFICE O/P NEW MOD 45 MIN: CPT | Performed by: PSYCHIATRY & NEUROLOGY

## 2021-01-04 RX ORDER — POTASSIUM CHLORIDE 1.5 G/1.77G
20 POWDER, FOR SOLUTION ORAL DAILY
COMMUNITY
End: 2021-04-09

## 2021-01-04 RX ORDER — TRAZODONE HYDROCHLORIDE 100 MG/1
100 TABLET ORAL
Qty: 30 TAB | Refills: 0 | Status: SHIPPED | OUTPATIENT
Start: 2021-01-04 | End: 2021-02-11

## 2021-01-04 RX ORDER — CHLORDIAZEPOXIDE HYDROCHLORIDE 25 MG/1
25 CAPSULE, GELATIN COATED ORAL
COMMUNITY
End: 2021-04-09

## 2021-01-04 NOTE — PROGRESS NOTES
Referring Physician: Chanel Kim MD     Reason for Consultation: Numbness and tingling     Chief Complaint: numbness and tingling in arms and legs     History of Present Illness:   Svetlana Rojo is a 28 y.o. female with a history of depression who presents to neurology clinic for evaluation of numbness and tingling in her feet hands. Patient reports that her symptoms started in June 2020 and feels like it is progressively worsening. She reports that the sensation of numbness as well as tingling was present in her bilateral feet and feels like it has progressed to just below the knees. She initially trialed diet and exercise to help with the symptoms however they did not improve. Around Thanksgiving she feels like her symptoms started occurring in her hands. She has difficulty with opening jars and water bottles given the numbness in her hands. She also feels like her balance has been off since the development of numbness and tingling in her feet. She reports that she was seen in the emergency room shortly after Thanksgiving however her symptoms were not addressed. She was given Librium and discharged from the ED. Past Medical History:   Diagnosis Date    Bartholin cyst     Infectious disease 2012    MRSA in left and right axillary        Past Surgical History:   Procedure Laterality Date    HX OTHER SURGICAL Left 2019    Elbow        Family History   Problem Relation Age of Onset    No Known Problems Mother     No Known Problems Father     Psoriasis Sister     Bipolar Disorder Brother     Schizophrenia Brother     Heart Disease Maternal Uncle     Heart Disease Maternal Grandmother     Heart Disease Maternal Grandfather         Social History     Tobacco Use    Smoking status: Current Every Day Smoker     Packs/day: 1.00     Years: 15.00     Pack years: 15.00    Smokeless tobacco: Never Used   Substance Use Topics    Alcohol use:  Yes     Alcohol/week: 5.8 standard drinks     Types: 7 Shots of liquor per week     Comment: 2-3/week        No Known Allergies     Prior to Admission medications    Medication Sig Start Date End Date Taking? Authorizing Provider   potassium chloride (KLOR-CON) 20 mEq pack Take 20 mEq by mouth daily. Yes Provider, Historical   chlordiazePOXIDE (LIBRIUM) 25 mg capsule Take 25 mg by mouth three (3) times daily as needed for Anxiety. Yes Provider, Historical   traZODone (DESYREL) 100 mg tablet Take 1 Tab by mouth nightly. 7/25/18  Yes Mely Evangelista NP   buPROPion SR [facility] SR) 150 mg SR tablet Take 1/2 tab by mouth daily for first 6 days then 1 tablet daily. 5/6/20   Giovanny Koo MD   hydrOXYzine HCL (ATARAX) 25 mg tablet Take 1 Tab by mouth three (3) times daily as needed for Anxiety. 3/23/20   Giovanny Koo MD   levonorgestrel (MIRENA) 20 mcg/24 hr (5 years) IUD 1 Each by IntraUTERine route once. Provider, Historical       Review of Systems:  General, constitutional: negative  Eyes, vision: negative  Ears, nose, throat: negative  Cardiovascular, heart: negative  Respiratory: negative  Gastrointestinal: negative  Genitourinary: negative  Musculoskeletal: negative  Skin and integumentary: negative  Psychiatric: negative  Endocrine: negative  Neurological: negative, except for HPI  Hematologic/lymphatic: negative  Allergy/immunology: negative    Visit Vitals  /75   Pulse 100   Ht 5' 3\" (1.6 m)   Wt 128 lb (58.1 kg)   SpO2 98%   BMI 22.67 kg/m²       Physical Exam:  General:  no acute distress  Neck: no carotid bruits  Lungs: clear to auscultation  Heart:  no murmurs, regular rate and rhythm   Lower extremity: no edema    Neurological exam:  Mental Status: Awake, alert, oriented to person, place and time   Attention and Concentration: able to state the days of the week backwards   Speech and Language: No dysarthria.  Able to name, repeat and follow commands   Fund of knowledge was preserved    Cranial nerves: II-XII  Pupils equal and reactive, visual fields intact by confrontation   Extraocular movements intact, no evidence of nystagmus or ptosis   Facial sensation intact   Facial movements symmetric   Hearing intact to soft rub bilaterally   Shoulder shrug symmetric and strong   Tongue protrusion full and midline without fasciculation or atrophy    Motor:   Normal tone and Bulk   Drift: No evidence of pronator drift     Strength testing:   deltoid triceps biceps Wrist ext. Wrist flex. intrinsics   Right 5 5 5 5 5 5   Left 5 5 5 5 5 5      Hip flex. Hip ext. Knee ext. Knee flex Dorsi flex Plantar flex   Right  5 5 5 5 5 5   Left  5 5 5 5 5 5       Sensory:  Decreased sensation in a patchy fashion in the bilateral lower extremities up to the knees. He also had loss of sensation to temperature and pinprick in the bilateral hands to the wrist.  She did not have any loss of vibration sensation. Reflexes:     Biceps Triceps  Brachiorad Patellar Achilles Plantar Hoffmans   Right  2 2 2 2 2 Down Neg   Left  2 2 2 2 2 Down Neg        Cerebellar testing:  No dysmetria. Normal rapid alternating movements; finger-to-nose and heel-to- shin testing are within normal limits. Romberg: absent    Gait: steady    Data:     INTERNAL RECORDS:  The patient's electronic medical record was reviewed. The relevant details include:    Lab Results   Component Value Date/Time    Hemoglobin A1c 5.0 10/23/2014 02:21 PM    Sodium 142 05/26/2017 02:31 PM    Potassium 3.2 (L) 05/26/2017 02:31 PM    Chloride 104 05/26/2017 02:31 PM    Glucose 91 05/26/2017 02:31 PM    BUN 8 05/26/2017 02:31 PM    Creatinine 0.65 05/26/2017 02:31 PM    Calcium 8.3 (L) 05/26/2017 02:31 PM    WBC 4.1 05/26/2017 02:31 PM    HCT 41.1 05/26/2017 02:31 PM    HGB 14.4 05/26/2017 02:31 PM    PLATELET 327 79/14/0782 02:31 PM       CT Results (maximum last 3):   Results from [place] encounter on 06/11/19   CT HEAD WO CONT    Narrative EXAM: CT HEAD WO CONT    INDICATION: Head injury mild or moderate acute, no neurological deficit    COMPARISON: None. CONTRAST: None. TECHNIQUE: Unenhanced CT of the head was performed using 5 mm images. Brain and  bone windows were generated. CT dose reduction was achieved through use of a  standardized protocol tailored for this examination and automatic exposure  control for dose modulation. FINDINGS:  The ventricles and sulci are normal in size, shape and configuration and  midline. Bifrontal volume loss is present. There is no significant white matter  disease. There is no intracranial hemorrhage, extra-axial collection, mass, mass  effect or midline shift. The basilar cisterns are open. No acute infarct is  identified. The bone windows demonstrate no abnormalities. The visualized  portions of the paranasal sinuses and mastoid air cells are clear. Impression IMPRESSION: No acute intracranial process seen       Results from Hospital Encounter encounter on 05/26/17   CT ABD PELV W CONT    Narrative INDICATION: RLQ abdominal pain, r/o appy     EXAM: CT Abdomen and CT Pelvis are performed with 100 mL Isovue 370contrast.  CT dose reduction was achieved through use of a standardized protocol tailored  for this examination and automatic exposure control for dose modulation. FINDINGS:   There is no focal inflammation, ascites, free air or significant adenopathy. Liver shows no significant finding. Bile ducts are not enlarged. Pancreas shows  no mass or inflammation. Spleen is unremarkable. Adrenal glands are normal in  size. Kidneys show no mass or hydronephrosis; there is a right renal cyst and a  punctate right renal nonobstructing stone. Aorta shows no aneurysm. The appendix is normal. The bladder is midline and the distal ureters are not  dilated. There is no apparent pelvic mass. There is an IUD in the uterus. Impression IMPRESSION: No Acute Disease.       Results from [place] encounter on 01/07/16   CT HEAD WO CONT    Narrative **Final Report**       ICD Codes / Adm. Diagnosis: 75   / Numbness  numbness in right leg  Examination:  CT HEAD  CON  - [id number] - Jan 8 2016 12:55AM  Accession No:  [id number]  Reason:  RLE numbness      REPORT:  Indication:  RLE numbness     Comparison: 7/25/2006    Findings: 5 mm axial images were obtained from the skull base through the   vertex. The ventricles and cortical sulci are appropriate in size and   configuration. There is no evidence of intracranial hemorrhage, mass, mass   effect, or acute infarct. No extra-axial fluid collections are seen. The   visualized paranasal sinuses and mastoid air cells are clear. The orbital   structures are unremarkable. No osseous abnormalities are seen. IMPRESSION: Normal head CT. Signing/Reading Doctor: Alaina Goodwin ([id number])    Leda Farnsworth ([id number])  Jan 8 2016 12:58AM                                      MRI Results (maximum last 3): No results found for this or any previous visit. Assessment and Plan   Solomon Childers is a 28 y.o. female with a history of depression who presents to neurology clinic for evaluation of numbness and tingling in her feet hands, likely due to a small fiber neuropathy. She did not have any loss of temperature or vibratory sensation in a length dependent fashion which would indicate large fiber neuropathy. Etiology is unclear however given her young age is likely due to B12 deficiency versus a toxic metabolic cause. Neuropathy: Unclear if this is large or small fiber however given that there is no sensation loss in a length dependent fashion it is likely a small fiber neuropathy.  - We will obtain blood work including a CBC, CMP, B12, MMA, TSH, immunofixation and folate levels.  -We will obtain an EMG with nerve conduction studies to determine etiology.   If this is negative we will likely need to obtain a skin biopsy looking for small fiber neuropathy  -As her symptoms are not intermittent and radicular nature and MRI is not needed.  -We discussed potentially trialing Lyrica however patient would like to wait on this medication until potential causes found.  -I did discuss for her symptoms she can use lidocaine patches on the bottom of her feet especially when sleeping if she feels too much pain. Patient Active Problem List   Diagnosis Code    Hypomagnesemia E83.42    Folate deficiency E53.8    Ingrown nail L60.0    Macrocytosis without anemia D75.89    Hypokalemia E87.6    Alcohol abuse F10.10      I have discussed the diagnosis with the patient and the intended plan as seen in the above orders. Patient is in agreement. The patient has received an after-visit summary and questions were answered concerning future plans. I have discussed medication side effects and warnings with the patient as well.         Signed By:  Kieran Boucher MD     January 4, 2021

## 2021-01-04 NOTE — PATIENT INSTRUCTIONS
Learning About Peripheral Neuropathy 
What is peripheral neuropathy? 
  
Peripheral neuropathy is a problem that affects the peripheral nerves. These nerves lead from the spinal cord to other parts of the body. They control your sense of touch, how you feel pain and temperature, and your muscle strength. 
Most of the time the problem starts in the fingers and toes. As it gets worse, it moves into the limbs. It can cause pain and loss of feeling in the feet, legs, and hands. 
What causes it? 
There are several causes: 
· Diabetes. This is the most common cause. If your blood sugar is too high for too long, it can damage the nerves. 
· Kidney problems. These can lead to toxic substances in the blood that damage nerves. 
· Low levels of thyroid hormone (hypothyroidism). This may cause swelling of the tissues around the nerves, which can put pressure on them. 
· Infectious or inflammatory diseases. Examples are HIV and Guillain-Barré syndrome. These diseases can damage the nerves. 
· Peripheral nerve injury. A physical injury can damage the nerves. Injuries can be from things like falls, car crashes, or playing sports. 
· Overusing alcohol and not eating a healthy diet. These can lead to your body not having enough of certain vitamins, such as vitamin B-12. This can damage nerves. 
· Being exposed to toxic substances. These include lead, mercury, and certain medicines, such as those used for chemotherapy. 
Sometimes the cause isn't known. 
What are the symptoms? 
Symptoms of peripheral neuropathy can occur slowly over time. The most common ones are: 
· Numbness, tightness, and tingling, especially in the legs, hands, and feet. 
· Loss of feeling. 
· Burning, shooting, or stabbing pain in the legs, hands, and feet. Often the pain is worse at night. 
· Weakness and loss of balance. 
What can happen if you have it? 
 If peripheral neuropathy gets worse, it can lead to a complete lack of feeling in your hands or feet. This can make you more likely to injure them. It may lead to calluses and blisters. It can also lead to bone and joint problems, infection, and ulcers. For instance, small, repeated injuries to the foot may lead to bigger problems. This can happen because you can't feel the injuries. Reduced feeling in the feet can also change your step, leading to bone or joint problems. If untreated, foot problems can become so severe that the foot or lower leg may have to be amputated. But treatment can slow down peripheral neuropathy. And it's a good idea to take care to avoid injury. How is it diagnosed? To diagnose peripheral neuropathy, your doctor will ask you about: 
· Your symptoms. · Your medical history. This may include your use of alcohol, risk of HIV infection, or exposure to toxic substances. · Your family's medical history, including nerve disease. Your doctor may test how well you can feel touch, temperature, and pain. You may also have blood tests. These tests will help the doctor find out if you have conditions that can cause neuropathy. Examples are diabetes, vitamin deficiencies, thyroid disease, and kidney problems. How is it treated? Treatment for peripheral neuropathy can relieve symptoms. This is done by treating the health problem that's causing it. For example, if you have diabetes, keeping your blood sugar within your target range may help. Or maybe your body lacks certain vitamins caused by drinking too much alcohol. In that case, treatment may include eating a healthy diet, taking vitamins, and stopping alcohol use. You may have physical therapy. This can increase muscle strength and help build muscle control. Over-the-counter medicine can relieve mild nerve pain. Your doctor may also prescribe medicine to help with severe pain, numbness, tingling, and weakness. If you have neuropathy in your feet, it's a good idea to have them checked during each office visit. This can help prevent problems. Some people find that physical therapy, acupuncture, or transcutaneous electrical nerve stimulation (TENS) helps relieve pain. Follow-up care is a key part of your treatment and safety. Be sure to make and go to all appointments, and call your doctor if you are having problems. It's also a good idea to know your test results and keep a list of the medicines you take. Where can you learn more? Go to http://www.gray.com/ Enter P130 in the search box to learn more about \"Learning About Peripheral Neuropathy. \" Current as of: December 20, 2019               Content Version: 12.6 © 2228-0102 [employer]. Care instructions adapted under license by [employer] (which disclaims liability or warranty for this information). If you have questions about a medical condition or this instruction, always ask your healthcare professional. [address] any warranty or liability for your use of this information.

## 2021-01-08 LAB
ALBUMIN SERPL-MCNC: 4.9 G/DL (ref 3.8–4.8)
ALBUMIN/GLOB SERPL: 2.1 {RATIO} (ref 1.2–2.2)
ALP SERPL-CCNC: 86 IU/L (ref 39–117)
ALT SERPL-CCNC: 98 IU/L (ref 0–32)
AST SERPL-CCNC: 132 IU/L (ref 0–40)
BASOPHILS # BLD AUTO: 0.1 X10E3/UL (ref 0–0.2)
BASOPHILS NFR BLD AUTO: 1 %
BILIRUB SERPL-MCNC: 2 MG/DL (ref 0–1.2)
BUN SERPL-MCNC: 24 MG/DL (ref 6–20)
BUN/CREAT SERPL: 32 (ref 9–23)
CALCIUM SERPL-MCNC: 11.4 MG/DL (ref 8.7–10.2)
CHLORIDE SERPL-SCNC: 87 MMOL/L (ref 96–106)
CO2 SERPL-SCNC: 21 MMOL/L (ref 20–29)
CREAT SERPL-MCNC: 0.76 MG/DL (ref 0.57–1)
EOSINOPHIL # BLD AUTO: 0 X10E3/UL (ref 0–0.4)
EOSINOPHIL NFR BLD AUTO: 0 %
ERYTHROCYTE [DISTWIDTH] IN BLOOD BY AUTOMATED COUNT: 15.8 % (ref 11.7–15.4)
EST. AVERAGE GLUCOSE BLD GHB EST-MCNC: 82 MG/DL
FOLATE SERPL-MCNC: 2.6 NG/ML
GLOBULIN SER CALC-MCNC: 2.3 G/DL (ref 1.5–4.5)
GLUCOSE SERPL-MCNC: 92 MG/DL (ref 65–99)
HBA1C MFR BLD: 4.5 % (ref 4.8–5.6)
HCT VFR BLD AUTO: 40.3 % (ref 34–46.6)
HGB BLD-MCNC: 15.3 G/DL (ref 11.1–15.9)
IGA SERPL-MCNC: 414 MG/DL (ref 87–352)
IGG SERPL-MCNC: 785 MG/DL (ref 586–1602)
IGM SERPL-MCNC: 132 MG/DL (ref 26–217)
IMM GRANULOCYTES # BLD AUTO: 0 X10E3/UL (ref 0–0.1)
IMM GRANULOCYTES NFR BLD AUTO: 0 %
LYMPHOCYTES # BLD AUTO: 0.9 X10E3/UL (ref 0.7–3.1)
LYMPHOCYTES NFR BLD AUTO: 9 %
Lab: NORMAL
MCH RBC QN AUTO: 40.2 PG (ref 26.6–33)
MCHC RBC AUTO-ENTMCNC: 38 G/DL (ref 31.5–35.7)
MCV RBC AUTO: 106 FL (ref 79–97)
METHYLMALONATE SERPL-SCNC: 162 NMOL/L (ref 0–378)
MONOCYTES # BLD AUTO: 0.8 X10E3/UL (ref 0.1–0.9)
MONOCYTES NFR BLD AUTO: 8 %
MORPHOLOGY BLD-IMP: ABNORMAL
NEUTROPHILS # BLD AUTO: 8.1 X10E3/UL (ref 1.4–7)
NEUTROPHILS NFR BLD AUTO: 82 %
PLATELET # BLD AUTO: 214 X10E3/UL (ref 150–450)
POTASSIUM SERPL-SCNC: 3.8 MMOL/L (ref 3.5–5.2)
PROT PATTERN SERPL IFE-IMP: ABNORMAL
PROT SERPL-MCNC: 7.2 G/DL (ref 6–8.5)
RBC # BLD AUTO: 3.81 X10E6/UL (ref 3.77–5.28)
SODIUM SERPL-SCNC: 128 MMOL/L (ref 134–144)
VIT B12 SERPL-MCNC: 413 PG/ML (ref 232–1245)
WBC # BLD AUTO: 9.9 X10E3/UL (ref 3.4–10.8)

## 2021-01-15 ENCOUNTER — OFFICE VISIT (OUTPATIENT)
Dept: NEUROLOGY | Age: 36
End: 2021-01-15
Payer: MEDICAID

## 2021-01-15 DIAGNOSIS — G62.9 SENSORY NEUROPATHY: ICD-10-CM

## 2021-01-15 DIAGNOSIS — G62.89 AXONAL NEUROPATHY: ICD-10-CM

## 2021-01-15 PROCEDURE — 95913 NRV CNDJ TEST 13/> STUDIES: CPT | Performed by: PSYCHIATRY & NEUROLOGY

## 2021-01-15 PROCEDURE — 95886 MUSC TEST DONE W/N TEST COMP: CPT | Performed by: PSYCHIATRY & NEUROLOGY

## 2021-01-15 NOTE — PROCEDURES
Electrodiagnostic Study Report  Test Date:  1/15/2021    Patient: Carron Gaucher : 1985 Physician: Dr. Nuno Chapman   Sex: Female Tech:  Ref Phys: Dr. Rohan Gonzalez:  Patient is a 28year-old female who presents with few sensory disturbance and pain. Her examination does reveal decreased pinprick in a length dependent pattern. Strength was 5-5 throughout. EMG & NCV Findings:    Nerve conduction studies as listed below was absent for the right superficial fibular. The left superficial fibular sensory, right sural sensory, right median sensory, right radial sensory, and right ulnar sensory all revealed normal peak latency with a reduced amplitude. The right fibular motor, median motor, tibial motor, left tibial motor, and right ulnar motor nerve conduction studies were normal    Muscle concentric needle examination revealed mild degree of active denervation in the right medial gastroc with normal appearing motor units. Impression: This study was abnormal.  There was electrodiagnostic evidence upon today's examination suggestin. A widespread sensory axonal neuropathy. 2.  There is no evidence of focal neuropathy, myopathy, or lumbar radiculopathy with active denervation. __________________  Brandon Dutton D.O.   [facility]        __________________  Nerve Conduction Studies  Anti Sensory Summary Table     Site NR Peak (ms) Norm Peak (ms) O-P Amp (µV) Norm O-P Amp Site1 Site2 Dist (cm)   Right Median Anti Sensory (2nd Digit)   Wrist    3.6 <4 7.7 >11 Wrist 2nd Digit 14.0   Right Radial Anti Sensory (Base 1st Digit)   Wrist    2.6 <2.8 5.1 >11 Wrist Base 1st Digit 10.0   Left Sup Fibular Anti Sensory (Lat ankle)   Lower leg    3.6 <4.5 4.2 >5 Lower leg Lat ankle 10.0   Site 2    3.7  3.6       Right Sup Fibular Anti Sensory (Lat ankle)   Lower leg NR  <4.5  >5 Lower leg Lat ankle 10.0   Right Sural Anti Sensory (Lat Mall)   Calf    4.3 <4.5 3.4 >4.0 Calf Lat Mall 14.0 Right Ulnar Anti Sensory (5th Digit)   Wrist    3.4 <4.0 7.7 >10 Wrist 5th Digit 14.0     Motor Summary Table     Site NR Onset (ms) Norm Onset (ms) O-P* Amp (mV) Norm O-P Amp P-T Amp (mV) Site1 Site2 Dist (cm) Wilman (m/s)   Right Fibular Motor (Ext Dig Brev)   Ankle    3.1 <6.5 2.5 >2.0  Ankle Ext Dig Brev 8.0 26   B Fib    10.1  2.0   B Fib Ankle 28.0 40   Poplt    11.7  1.9   Poplt B Fib 10.0 63   Right Median Motor (Abd Poll Brev)   Wrist    4.1 <4.5 6.3 >4.1  Wrist Abd Poll Brev 8.0 20   Elbow    7.8  5.1   Elbow Wrist 19.0 51   Left Tibial Motor (Abd Kelley Brev)   Ankle    4.7 <6.1 15.7 >5.3  Ankle Abd Kelley Brev 8.0 17   Knee    13.4  12.4   Knee Ankle 41.0 47   Right Tibial Motor (Abd Kelley Brev)   Ankle    4.8 <6.1 14.9 >5.3  Ankle Abd Kelley Brev 8.0 17   Knee    12.9  13.0   Knee Ankle 41.0 51   Right Ulnar Motor (Abd Dig Minimi)   Wrist    3.4 <3.6 7.3 >7.0  Wrist Abd Dig Minimi 8.0    B Elbow    6.6  6.8   B Elbow Wrist 20.0 63   A Elbow    8.2  6.8   A Elbow B Elbow 10.0 63     Comparison Summary Table     Site NR Peak (ms) P-T Amp (µV) Site1 Site2 Dist (cm) Delta-0 (ms)   Right Median/Ulnar Palm Comparison (Wrist)   Median Palm    2.1 15.3 Median Palm Ulnar Palm 8.0 0.0   Ulnar Palm    2.0 7.2         EMG     Side Muscle Nerve Root Ins Act Fibs Psw Recrt Duration Amp Poly Comment   Right AntTibialis Dp Br Peron L4-5 Nml Nml Nml Nml Nml Nml Nml    Right MedGastroc Tibial S1-2 Incr 1+ Nml Nml Nml Nml Nml    Right VastusLat Femoral L2-4 Nml Nml Nml Nml Nml Nml Nml    Right Semitendinosus Sciatic L5-S2 Nml Nml Nml Nml Nml Nml Nml    Right GluteusMed SupGluteal L4-S1 Nml Nml Nml Nml Nml Nml Nml        Waveforms:

## 2021-01-21 ENCOUNTER — TELEPHONE (OUTPATIENT)
Dept: NEUROLOGY | Age: 36
End: 2021-01-21

## 2021-01-21 NOTE — TELEPHONE ENCOUNTER
----- Message from [employer] sent at 1/21/2021 12:04 PM EST -----  Regarding: Dr. Tae Thakur Message/Vendor Calls    Caller's first and last name:  N/A      Reason for call:  EMG results      Callback required yes/no and why:  Y      Best contact number(s):  [phone] (work number until 5:00p.m.)  or [phone]        Details to clarify the request:  Patient is requesting a call back with her EMG results that was done on 1/15/21. She does not have access to [employer]

## 2021-02-09 ENCOUNTER — TELEPHONE (OUTPATIENT)
Dept: NEUROLOGY | Age: 36
End: 2021-02-09

## 2021-02-09 NOTE — TELEPHONE ENCOUNTER
----- Message from Otto Snellen sent at 2/8/2021  4:11 PM EST -----  Regarding: Dr. Zee Corbett Message/Vendor Calls    Caller's first and last name: Mom      Reason for call: pt's medication      Callback required yes/no and why: Yes, contact pt      Best contact number(s):[phone]      Details to clarify the request: Pt's mom calling very irate in regards to pt's neuropathy and the pain she is experiencing. Pt's mom is not listed on pt's contacts to speak with. She is very upset that pt has not been contacted about her medication. Please advise.       Otto Snellen

## 2021-02-10 ENCOUNTER — TELEPHONE (OUTPATIENT)
Dept: NEUROLOGY | Age: 36
End: 2021-02-10

## 2021-02-10 DIAGNOSIS — F51.04 CHRONIC INSOMNIA: ICD-10-CM

## 2021-02-10 NOTE — TELEPHONE ENCOUNTER
Reji,    Can we set up an appointment for Ms. Dolan to go over test results. I have tried to call her multiple times and have not had any luck to talk to her about the EMG findings.   We can try to set up an appointment virtually the following week if possible    Thank you

## 2021-02-10 NOTE — TELEPHONE ENCOUNTER
Writer called pt per mds request. md would like her to do a v/v for her test results.  Had to lm with pt

## 2021-02-11 RX ORDER — TRAZODONE HYDROCHLORIDE 100 MG/1
TABLET ORAL
Qty: 30 TAB | Refills: 0 | Status: SHIPPED | OUTPATIENT
Start: 2021-02-11 | End: 2021-02-13

## 2021-02-12 ENCOUNTER — OFFICE VISIT (OUTPATIENT)
Dept: NEUROLOGY | Age: 36
End: 2021-02-12
Payer: MEDICAID

## 2021-02-12 VITALS — WEIGHT: 135 LBS | BODY MASS INDEX: 23.91 KG/M2

## 2021-02-12 DIAGNOSIS — G62.9 SENSORY NEUROPATHY: Primary | ICD-10-CM

## 2021-02-12 DIAGNOSIS — F51.04 CHRONIC INSOMNIA: ICD-10-CM

## 2021-02-12 PROCEDURE — 99214 OFFICE O/P EST MOD 30 MIN: CPT | Performed by: PSYCHIATRY & NEUROLOGY

## 2021-02-12 RX ORDER — PREGABALIN 75 MG/1
75 CAPSULE ORAL 2 TIMES DAILY
Qty: 60 CAP | Refills: 2 | Status: SHIPPED | OUTPATIENT
Start: 2021-02-12 | End: 2021-06-28 | Stop reason: SDUPTHER

## 2021-02-12 NOTE — PROGRESS NOTES
Neurology Note    Patient ID:  Karmen Russ  [id number]  12 y.o.  1985      Date of Consultation:  February 12, 2021    This is a telemedicine visit that was performed with in the originating site at patient's home and the distance site at [facility] outpatient clinic at [facility].  This telemedicine visit utilized synchronous (real-time) audio-video technology. Verbal consent to participate in the video visit was obtained. This visit occurred during the corona (COVID -19) public health emergency. I discussed with the patient the nature of our telemedicine visit, that:  - I would evaluate the patient and recommend diagnostic and treatment based on my assessment  - Our sessions are not being recorded and that personal health information is protected  - Our team will provide follow-up care in person if and when the patient needs it. Consent:  The patient is aware that this patient-initiated Telehealth encounter is a billable service, with coverage as determined by the patient's insurance carrier. The patient is aware that they may receive a bill and has provided verbal consent to proceed:     Subjective:     CC: follow    History of Present Illness:   Karmen Russ is a 39 y.o. female with a history of depression who presents to neurology clinic for evaluation of numbness and tingling in her feet hands. Patient reports that her symptoms started in June 2020 and feels like it is progressively worsening. She reports that the sensation of numbness as well as tingling was present in her bilateral feet and feels like it has progressed to just below the knees. She initially trialed diet and exercise to help with the symptoms however they did not improve. Around Thanksgiving she feels like her symptoms started occurring in her hands. She has difficulty with opening jars and water bottles given the numbness in her hands.   She also feels like her balance has been off since the development of numbness and tingling in her feet. She reports that she was seen in the emergency room shortly after Thanksgiving however her symptoms were not addressed. She was given Librium and discharged from the ED. Interval hx:   Since patient was last seen she reports that her symptoms have persisted and still are the same. She continues to feel numbness in her hands and feet and tingling as well. Reports that this can be painful especially when she is touching objects or walking. She continues to report that she has trouble with opening jars and bottles. She feels like she cannot feel her fingertips to do this function properly. Additionally she does have some difficulty with gait imbalance and unsteadiness especially when going up and down stairs. Also we discussed her alcohol use and she says that she has been drinking since the age of 15 1-1/2 glasses of hard liquor a day most recently. She has gone approximately 1 week without drinking and did not experience any withdrawal symptoms. She is currently on Librium for her alcohol use. Past Medical History:   Diagnosis Date    Bartholin cyst     Infectious disease 2012    MRSA in left and right axillary        Past Surgical History:   Procedure Laterality Date    HX OTHER SURGICAL Left 2019    Elbow        Family History   Problem Relation Age of Onset    No Known Problems Mother     No Known Problems Father     Psoriasis Sister     Bipolar Disorder Brother     Schizophrenia Brother     Heart Disease Maternal Uncle     Heart Disease Maternal Grandmother     Heart Disease Maternal Grandfather         Social History     Tobacco Use    Smoking status: Current Every Day Smoker     Packs/day: 1.00     Years: 15.00     Pack years: 15.00    Smokeless tobacco: Never Used   Substance Use Topics    Alcohol use:  Yes     Alcohol/week: 5.8 standard drinks     Types: 7 Shots of liquor per week     Comment: 2-3/week        No Known Allergies     Prior to Admission medications    Medication Sig Start Date End Date Taking? Authorizing Provider   traZODone (DESYREL) 100 mg tablet TAKE 1 TABLET BY MOUTH EVERY NIGHT 2/11/21  Yes Sebastian Chi MD   levonorgestrel (MIRENA) 20 mcg/24 hr (5 years) IUD 1 Each by IntraUTERine route once. Yes Provider, Historical   potassium chloride (KLOR-CON) 20 mEq pack Take 20 mEq by mouth daily. Provider, Historical   chlordiazePOXIDE (LIBRIUM) 25 mg capsule Take 25 mg by mouth three (3) times daily as needed for Anxiety. Provider, Historical   buPROPion SR (WELLBUTRIN SR) 150 mg SR tablet Take 1/2 tab by mouth daily for first 6 days then 1 tablet daily. 5/6/20   Will Carias MD   hydrOXYzine HCL (ATARAX) 25 mg tablet Take 1 Tab by mouth three (3) times daily as needed for Anxiety. 3/23/20   Will Carias MD       Review of Systems:    General, constitutional: negative  Eyes, vision: negative  Ears, nose, throat: negative  Cardiovascular, heart: negative  Respiratory: negative  Gastrointestinal: negative  Genitourinary: negative  Musculoskeletal: negative  Skin and integumentary: negative  Psychiatric: negative  Endocrine: negative  Neurological: negative, except for HPI  Hematologic/lymphatic: negative  Allergy/immunology: negative    Objective:     No vital signs were obtained via telemedicine today. There are limitations to the neurological examination due to the technological features of telemedicine     Physical Exam:  General:  appears well nourished in no acute distress  Respiratory:  good respiratory effort. No labored breathing  Skin: intact. No obvious erythematous rashes     Neurological exam:  Awake, alert, oriented to person, place and time  Attention and concentration were intact  Language was intact.   There was no aphasia  Speech: no dysarthria  Fund of knowledge was preserved     Cranial nerves:   Visual fields were full  Eomi, no evidence of nystagmus  Facial motor: normal and symmetric  Hearing intact  Tongue: midline without fasciculations     Motor:   Appears full strength in the upper and lower extremities. No drift was noted     Sensory:  Intact per patient except for the fingertips and bottom of the feet which appear to be numb per patient     Reflexes:  Unable to obtain via telemedicine     Cerebellar testing:  no tremor apparent, finger/nose and emily were intact     Romberg: absent     Gait: steady. Labs:     Lab Results   Component Value Date/Time    Hemoglobin A1c 4.5 (L) 01/04/2021 10:16 AM    Sodium 128 (L) 01/04/2021 10:16 AM    Potassium 3.8 01/04/2021 10:16 AM    Chloride 87 (L) 01/04/2021 10:16 AM    Glucose 92 01/04/2021 10:16 AM    BUN 24 (H) 01/04/2021 10:16 AM    Creatinine 0.76 01/04/2021 10:16 AM    Calcium 11.4 (H) 01/04/2021 10:16 AM    WBC 9.9 01/04/2021 10:16 AM    HCT 40.3 01/04/2021 10:16 AM    HGB 15.3 01/04/2021 10:16 AM    PLATELET 391 63/16/4914 10:16 AM       Imaging:    No results found for this or any previous visit. Results from [place] encounter on 06/11/19   CT HEAD WO CONT    Narrative EXAM: CT HEAD WO CONT    INDICATION: Head injury mild or moderate acute, no neurological deficit    COMPARISON: None. CONTRAST: None. TECHNIQUE: Unenhanced CT of the head was performed using 5 mm images. Brain and  bone windows were generated. CT dose reduction was achieved through use of a  standardized protocol tailored for this examination and automatic exposure  control for dose modulation. FINDINGS:  The ventricles and sulci are normal in size, shape and configuration and  midline. Bifrontal volume loss is present. There is no significant white matter  disease. There is no intracranial hemorrhage, extra-axial collection, mass, mass  effect or midline shift. The basilar cisterns are open. No acute infarct is  identified. The bone windows demonstrate no abnormalities. The visualized  portions of the paranasal sinuses and mastoid air cells are clear. Impression IMPRESSION: No acute intracranial process seen           Assessment and Plan   Aileen Lynch is a 39 y.o. 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 Etiology is likely due to chronic alcohol consumption however cannot rule out other nutritional and autoimmune diseases.   -We will obtain laboratory studies including a B1, B6, SSA SSB antibodies, HIV and anti-Hu antibodies. We may need to do a skin biopsy to determine if this is secondary to amyloid however we will hold off on this until other laboratory studies have been completed  -We will also start patient on Lyrica 75 mg at bedtime. I did recommend that we keep it at this level for 1 week and then increase to 75 mg twice a day if she is able to tolerate the side effects of sedation.  -Discussed that it would be very important that she starts a multivitamin as well as try to reduce her alcohol consumption.  -I have placed a referral for PT and OT consultations given her gait instability.  This is likely a due to a sensory ataxia    Patient Active Problem List   Diagnosis Code    Hypomagnesemia E83.42    Folate deficiency E53.8    Ingrown nail L60.0    Macrocytosis without anemia D75.89    Hypokalemia E87.6    Alcohol abuse F10.10        Signed By:  iNkhil Rowley MD     February 12, 2021

## 2021-02-12 NOTE — PATIENT INSTRUCTIONS
Learning About Peripheral Neuropathy What is peripheral neuropathy? Peripheral neuropathy is a problem that affects the peripheral nerves. These nerves lead from the spinal cord to other parts of the body. They control your sense of touch, how you feel pain and temperature, and your muscle strength. Most of the time the problem starts in the fingers and toes. As it gets worse, it moves into the limbs. It can cause pain and loss of feeling in the feet, legs, and hands. What causes it? There are several causes: · Diabetes. This is the most common cause. If your blood sugar is too high for too long, it can damage the nerves. · Kidney problems. These can lead to toxic substances in the blood that damage nerves. · Low levels of thyroid hormone (hypothyroidism). This may cause swelling of the tissues around the nerves, which can put pressure on them. · Infectious or inflammatory diseases. Examples are HIV and Guillain-Barré syndrome. These diseases can damage the nerves. · Peripheral nerve injury. A physical injury can damage the nerves. Injuries can be from things like falls, car crashes, or playing sports. · Overusing alcohol and not eating a healthy diet. These can lead to your body not having enough of certain vitamins, such as vitamin B-12. This can damage nerves. · Being exposed to toxic substances. These include lead, mercury, and certain medicines, such as those used for chemotherapy. Sometimes the cause isn't known. What are the symptoms? Symptoms of peripheral neuropathy can occur slowly over time. The most common ones are: · Numbness, tightness, and tingling, especially in the legs, hands, and feet. · Loss of feeling. · Burning, shooting, or stabbing pain in the legs, hands, and feet. Often the pain is worse at night. · Weakness and loss of balance. What can happen if you have it? If peripheral neuropathy gets worse, it can lead to a complete lack of feeling in your hands or feet. This can make you more likely to injure them. It may lead to calluses and blisters. It can also lead to bone and joint problems, infection, and ulcers. For instance, small, repeated injuries to the foot may lead to bigger problems. This can happen because you can't feel the injuries. Reduced feeling in the feet can also change your step, leading to bone or joint problems. If untreated, foot problems can become so severe that the foot or lower leg may have to be amputated. But treatment can slow down peripheral neuropathy. And it's a good idea to take care to avoid injury. How is it diagnosed? To diagnose peripheral neuropathy, your doctor will ask you about: 
· Your symptoms. · Your medical history. This may include your use of alcohol, risk of HIV infection, or exposure to toxic substances. · Your family's medical history, including nerve disease. Your doctor may test how well you can feel touch, temperature, and pain. You may also have blood tests. These tests will help the doctor find out if you have conditions that can cause neuropathy. Examples are diabetes, vitamin deficiencies, thyroid disease, and kidney problems. How is it treated? Treatment for peripheral neuropathy can relieve symptoms. This is done by treating the health problem that's causing it. For example, if you have diabetes, keeping your blood sugar within your target range may help. Or maybe your body lacks certain vitamins caused by drinking too much alcohol. In that case, treatment may include eating a healthy diet, taking vitamins, and stopping alcohol use. You may have physical therapy. This can increase muscle strength and help build muscle control. Over-the-counter medicine can relieve mild nerve pain. Your doctor may also prescribe medicine to help with severe pain, numbness, tingling, and weakness. If you have neuropathy in your feet, it's a good idea to have them checked during each office visit. This can help prevent problems. Some people find that physical therapy, acupuncture, or transcutaneous electrical nerve stimulation (TENS) helps relieve pain. Follow-up care is a key part of your treatment and safety. Be sure to make and go to all appointments, and call your doctor if you are having problems. It's also a good idea to know your test results and keep a list of the medicines you take. Where can you learn more? Go to http://www.gray.com/ Enter P130 in the search box to learn more about \"Learning About Peripheral Neuropathy. \" Current as of: December 20, 2019               Content Version: 12.6 © 4245-3027 [employer]. Care instructions adapted under license by [employer] (which disclaims liability or warranty for this information). If you have questions about a medical condition or this instruction, always ask your healthcare professional. [address] any warranty or liability for your use of this information.

## 2021-02-12 NOTE — PROGRESS NOTES
Identified pt with two pt identifiers(name and ). Reviewed record in preparation for visit and have obtained necessary documentation. Chief Complaint   Patient presents with    Follow-up    Numbness     x6 months; leg from knee down; fingers as well; constant      Vitals:    21 1032   Weight: 135 lb (61.2 kg)       Health Maintenance Review: Patient reminded of \"due or due soon\" health maintenance. I have asked the patient to contact his/her primary care provider (PCP) for follow-up on his/her health maintenance. Coordination of Care Questionnaire:  :   1) Have you been to an emergency room, urgent care, or hospitalized since your last visit? If yes, where when, and reason for visit? no       2. Have seen or consulted any other health care provider since your last visit? If yes, where when, and reason for visit? NO      Patient is accompanied by self I have received verbal consent from [address] [place] [address] to discuss any/all medical information while they are present in the room.

## 2021-02-13 RX ORDER — TRAZODONE HYDROCHLORIDE 100 MG/1
TABLET ORAL
Qty: 30 TAB | Refills: 0 | Status: SHIPPED | OUTPATIENT
Start: 2021-02-13 | End: 2021-04-09 | Stop reason: SDUPTHER

## 2021-03-02 ENCOUNTER — PATIENT MESSAGE (OUTPATIENT)
Dept: NEUROLOGY | Age: 36
End: 2021-03-02

## 2021-03-02 DIAGNOSIS — G62.89 AXONAL NEUROPATHY: Primary | ICD-10-CM

## 2021-03-02 DIAGNOSIS — M79.89 LEG SWELLING: ICD-10-CM

## 2021-03-02 NOTE — TELEPHONE ENCOUNTER
From: Toyin Dolan  To: Ian Lepe MD  Sent: 3/2/2021 2:25 PM EST  Subject: Non-Urgent Medical Question    Hi, I wanted to let you know the Lyrica has been working great. It helps me sleep and it's definately easier getting out of bed feeling and acting not like a 13 year old grandma struggling to the bathroom. It also doesn't make me groggy in the morning. With all that said. ...while at work around 3:00 every day I start limping my legs hurt bad. Over the last 3 weeks my right leg has swollen. That's because it's my dominate leg ,. I researched I'm pretty freaked out it may lead to amputation. I start physical theropy next week ,I don't see how that is supposed to help. I do physical theropy everyday then end up limping at the end of the day and now my leg is swollen. I'm so confused. Hope to hear from you soon.  Thanks, Leonel

## 2021-03-02 NOTE — TELEPHONE ENCOUNTER
Call patient she reports that she has been having swelling in her right leg for the last 3 weeks. She feels like it is also tender to touch. I have placed lower extremity Doppler to rule out DVT and have urged patient to go see her PCP regarding this. We discussed that neuropathy does not specifically result in unilateral swelling.

## 2021-03-18 LAB
A-TOCOPHEROL VIT E SERPL-MCNC: 9 MG/L (ref 5.9–19.4)
ENA SS-A AB SER-ACNC: <0.2 AI (ref 0–0.9)
ENA SS-B AB SER-ACNC: <0.2 AI (ref 0–0.9)
GAMMA TOCOPHEROL SERPL-MCNC: 3.4 MG/L (ref 0.7–4.9)
HIV 1+2 AB+HIV1 P24 AG SERPL QL IA: NON REACTIVE
VIT B1 BLD-SCNC: 79.9 NMOL/L (ref 66.5–200)
VIT B6 SERPL-MCNC: 17.7 UG/L (ref 2–32.8)

## 2021-04-09 ENCOUNTER — OFFICE VISIT (OUTPATIENT)
Dept: INTERNAL MEDICINE CLINIC | Age: 36
End: 2021-04-09
Payer: MEDICAID

## 2021-04-09 VITALS
HEART RATE: 104 BPM | OXYGEN SATURATION: 96 % | WEIGHT: 127.6 LBS | BODY MASS INDEX: 22.61 KG/M2 | HEIGHT: 63 IN | DIASTOLIC BLOOD PRESSURE: 88 MMHG | TEMPERATURE: 98.4 F | SYSTOLIC BLOOD PRESSURE: 127 MMHG | RESPIRATION RATE: 16 BRPM

## 2021-04-09 DIAGNOSIS — F51.04 CHRONIC INSOMNIA: ICD-10-CM

## 2021-04-09 DIAGNOSIS — K59.03 DRUG-INDUCED CONSTIPATION: ICD-10-CM

## 2021-04-09 DIAGNOSIS — Z00.00 ROUTINE GENERAL MEDICAL EXAMINATION AT HEALTH CARE FACILITY: Primary | ICD-10-CM

## 2021-04-09 DIAGNOSIS — F10.20 UNCOMPLICATED ALCOHOL DEPENDENCE (HCC): ICD-10-CM

## 2021-04-09 DIAGNOSIS — Z12.4 SCREENING FOR CERVICAL CANCER: ICD-10-CM

## 2021-04-09 DIAGNOSIS — G62.9 SENSORY NEUROPATHY: ICD-10-CM

## 2021-04-09 PROCEDURE — 99214 OFFICE O/P EST MOD 30 MIN: CPT | Performed by: INTERNAL MEDICINE

## 2021-04-09 RX ORDER — TRAZODONE HYDROCHLORIDE 100 MG/1
TABLET ORAL
Qty: 90 TAB | Refills: 3 | Status: SHIPPED | OUTPATIENT
Start: 2021-04-09 | End: 2021-06-28 | Stop reason: SDUPTHER

## 2021-04-09 RX ORDER — DOCUSATE SODIUM 100 MG/1
100 CAPSULE, LIQUID FILLED ORAL 2 TIMES DAILY
Qty: 60 CAP | Refills: 1 | Status: SHIPPED | OUTPATIENT
Start: 2021-04-09 | End: 2021-08-10

## 2021-04-09 RX ORDER — PREGABALIN 75 MG/1
75 CAPSULE ORAL 2 TIMES DAILY
Qty: 60 CAP | Refills: 2 | Status: CANCELLED | OUTPATIENT
Start: 2021-04-09

## 2021-04-09 RX ORDER — LACTULOSE 10 G/15ML
10 SOLUTION ORAL; RECTAL
Qty: 236 ML | Refills: 1 | Status: SHIPPED | OUTPATIENT
Start: 2021-04-09 | End: 2021-08-10

## 2021-04-09 NOTE — PROGRESS NOTES
Toyin Dolan  Identified pt with two pt identifiers(name and ). Chief Complaint   Patient presents with    Physical    Neuropathy    Constipation       Reviewed record In preparation for visit and have obtained necessary documentation. 1. Have you been to the ER, urgent care clinic or hospitalized since your last visit? No     2. Have you seen or consulted any other health care providers outside of the [address] [place] [address] since your last visit? Include any pap smears or colon screening. Diagnosed with Neuropathy     Patient does not have an advance directive. Vitals reviewed with provider.     Health Maintenance reviewed:     Health Maintenance Due   Topic    Hepatitis C Screening     Pneumococcal 0-64 years (1 of 1 - PPSV23)    COVID-19 Vaccine (1)    PAP AKA CERVICAL CYTOLOGY           Wt Readings from Last 3 Encounters:   21 127 lb 9.6 oz (57.9 kg)   21 135 lb (61.2 kg)   21 128 lb (58.1 kg)        Temp Readings from Last 3 Encounters:   21 98.4 °F (36.9 °C) (Oral)   20 99.2 °F (37.3 °C) (Oral)   19 98.3 °F (36.8 °C)        BP Readings from Last 3 Encounters:   21 127/88   21 110/75   20 119/84        Pulse Readings from Last 3 Encounters:   21 (!) 104   21 100   20 (!) 118        Vitals:    21 1453   BP: 127/88   Pulse: (!) 104   Resp: 16   Temp: 98.4 °F (36.9 °C)   TempSrc: Oral   SpO2: 96%   Weight: 127 lb 9.6 oz (57.9 kg)   Height: 5' 3\" (1.6 m)   PainSc:   0 - No pain          Learning Assessment:   :       Learning Assessment 2014   PRIMARY LEARNER Patient   HIGHEST LEVEL OF EDUCATION - PRIMARY LEARNER  GRADUATED HIGH SCHOOL OR GED   BARRIERS PRIMARY LEARNER NONE   CO-LEARNER CAREGIVER No   PRIMARY LANGUAGE ENGLISH    NEED No   LEARNER PREFERENCE PRIMARY DEMONSTRATION   ANSWERED BY patient   RELATIONSHIP SELF        Depression Screening:   :       3 most recent PHQ Screens 2021 Little interest or pleasure in doing things Not at all   Feeling down, depressed, irritable, or hopeless Not at all   Total Score PHQ 2 0        Fall Risk Assessment:   :       Fall Risk Assessment, last 12 mths 2/12/2021   Able to walk? Yes   Fall in past 12 months? 1   Do you feel unsteady? 1   Are you worried about falling 1   Is the gait abnormal? 1   Number of falls in past 12 months 2   Fall with injury? 0        Abuse Screening:   :     No flowsheet data found.      ADL Screening:   :       ADL Assessment 1/20/2017   Feeding yourself No Help Needed   Getting from bed to chair No Help Needed   Getting dressed No Help Needed   Bathing or showering No Help Needed   Walk across the room (includes cane/walker) No Help Needed   Using the telphone No Help Needed   Taking your medications No Help Needed   Preparing meals No Help Needed   Managing money (expenses/bills) No Help Needed   Moderately strenuous housework (laundry) No Help Needed   Shopping for personal items (toiletries/medicines) No Help Needed   Shopping for groceries No Help Needed   Driving No Help Needed   Climbing a flight of stairs No Help Needed   Getting to places beyond walking distances No Help Needed

## 2021-04-09 NOTE — PATIENT INSTRUCTIONS
Well Visit, Ages 25 to 48: Care Instructions  Overview     Well visits can help you stay healthy. Your doctor has checked your overall health and may have suggested ways to take good care of yourself. Your doctor also may have recommended tests. At home, you can help prevent illness with healthy eating, regular exercise, and other steps. Follow-up care is a key part of your treatment and safety. Be sure to make and go to all appointments, and call your doctor if you are having problems. It's also a good idea to know your test results and keep a list of the medicines you take. How can you care for yourself at home? · Get screening tests that you and your doctor decide on. Screening helps find diseases before any symptoms appear. · Eat healthy foods. Choose fruits, vegetables, whole grains, protein, and low-fat dairy foods. Limit fat, especially saturated fat. Reduce salt in your diet. · Limit alcohol. If you are a man, have no more than 2 drinks a day or 14 drinks a week. If you are a woman, have no more than 1 drink a day or 7 drinks a week. · Get at least 30 minutes of physical activity on most days of the week. Walking is a good choice. You also may want to do other activities, such as running, swimming, cycling, or playing tennis or team sports. Discuss any changes in your exercise program with your doctor. · Reach and stay at a healthy weight. This will lower your risk for many problems, such as obesity, diabetes, heart disease, and high blood pressure. · Do not smoke or allow others to smoke around you. If you need help quitting, talk to your doctor about stop-smoking programs and medicines. These can increase your chances of quitting for good. · Care for your mental health. It is easy to get weighed down by worry and stress. Learn strategies to manage stress, like deep breathing and mindfulness, and stay connected with your family and community.  If you find you often feel sad or hopeless, talk with your doctor. Treatment can help. · Talk to your doctor about whether you have any risk factors for sexually transmitted infections (STIs). You can help prevent STIs if you wait to have sex with a new partner (or partners) until you've each been tested for STIs. It also helps if you use condoms (male or female condoms) and if you limit your sex partners to one person who only has sex with you. Vaccines are available for some STIs, such as HPV. · Use birth control if it's important to you to prevent pregnancy. Talk with your doctor about the choices available and what might be best for you. · If you think you may have a problem with alcohol or drug use, talk to your doctor. This includes prescription medicines (such as amphetamines and opioids) and illegal drugs (such as cocaine and methamphetamine). Your doctor can help you figure out what type of treatment is best for you. · Protect your skin from too much sun. When you're outdoors from 10 a.m. to 4 p.m., stay in the shade or cover up with clothing and a hat with a wide brim. Wear sunglasses that block UV rays. Even when it's cloudy, put broad-spectrum sunscreen (SPF 30 or higher) on any exposed skin. · See a dentist one or two times a year for checkups and to have your teeth cleaned. · Wear a seat belt in the car. When should you call for help? Watch closely for changes in your health, and be sure to contact your doctor if you have any problems or symptoms that concern you. Where can you learn more? Go to http://www.[employer].com/  Enter P072 in the search box to learn more about \"Well Visit, Ages 25 to 48: Care Instructions. \"  Current as of: May 27, 2020               Content Version: 12.8  © 5109-5439 [employer]. Care instructions adapted under license by [employer] (which disclaims liability or warranty for this information).  If you have questions about a medical condition or this instruction, always ask your healthcare professional. [place] [address] any warranty or liability for your use of this information.
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
## 2021-06-28 ENCOUNTER — PATIENT MESSAGE (OUTPATIENT)
Dept: NEUROLOGY | Age: 36
End: 2021-06-28

## 2021-06-28 DIAGNOSIS — G62.9 SENSORY NEUROPATHY: ICD-10-CM

## 2021-06-28 DIAGNOSIS — G62.9 SENSORY NEUROPATHY: Primary | ICD-10-CM

## 2021-07-04 ENCOUNTER — HOSPITAL ENCOUNTER (EMERGENCY)
Age: 36
Discharge: HOME | End: 2021-07-04
Attending: EMERGENCY MEDICINE
Payer: MEDICAID

## 2021-07-04 VITALS
HEART RATE: 112 BPM | OXYGEN SATURATION: 93 % | SYSTOLIC BLOOD PRESSURE: 109 MMHG | DIASTOLIC BLOOD PRESSURE: 74 MMHG | HEIGHT: 63 IN | BODY MASS INDEX: 21.33 KG/M2 | RESPIRATION RATE: 14 BRPM | WEIGHT: 120.37 LBS | TEMPERATURE: 98.5 F

## 2021-07-04 DIAGNOSIS — E86.0 DEHYDRATION: ICD-10-CM

## 2021-07-04 DIAGNOSIS — R11.2 NON-INTRACTABLE VOMITING WITH NAUSEA, UNSPECIFIED VOMITING TYPE: Primary | ICD-10-CM

## 2021-07-04 LAB
ALBUMIN SERPL-MCNC: 4.3 G/DL (ref 3.5–5)
ALBUMIN/GLOB SERPL: 1 {RATIO} (ref 1.1–2.2)
ALP SERPL-CCNC: 105 U/L (ref 45–117)
ALT SERPL-CCNC: 115 U/L (ref 12–78)
ANION GAP SERPL CALC-SCNC: 13 MMOL/L (ref 5–15)
AST SERPL-CCNC: 158 U/L (ref 15–37)
BASOPHILS # BLD: 0 K/UL (ref 0–0.1)
BASOPHILS NFR BLD: 0 % (ref 0–1)
BILIRUB SERPL-MCNC: 6 MG/DL (ref 0.2–1)
BUN SERPL-MCNC: 11 MG/DL (ref 6–20)
BUN/CREAT SERPL: 10 (ref 12–20)
CALCIUM SERPL-MCNC: 10.1 MG/DL (ref 8.5–10.1)
CHLORIDE SERPL-SCNC: 95 MMOL/L (ref 97–108)
CO2 SERPL-SCNC: 22 MMOL/L (ref 21–32)
CREAT SERPL-MCNC: 1.06 MG/DL (ref 0.55–1.02)
DIFFERENTIAL METHOD BLD: ABNORMAL
EOSINOPHIL # BLD: 0 K/UL (ref 0–0.4)
EOSINOPHIL NFR BLD: 0 % (ref 0–7)
ERYTHROCYTE [DISTWIDTH] IN BLOOD BY AUTOMATED COUNT: 13.4 % (ref 11.5–14.5)
GLOBULIN SER CALC-MCNC: 4.1 G/DL (ref 2–4)
GLUCOSE SERPL-MCNC: 171 MG/DL (ref 65–100)
HCT VFR BLD AUTO: 43.6 % (ref 35–47)
HGB BLD-MCNC: 15.6 G/DL (ref 11.5–16)
IMM GRANULOCYTES # BLD AUTO: 0 K/UL (ref 0–0.04)
IMM GRANULOCYTES NFR BLD AUTO: 0 % (ref 0–0.5)
LIPASE SERPL-CCNC: 76 U/L (ref 73–393)
LYMPHOCYTES # BLD: 0.4 K/UL (ref 0.8–3.5)
LYMPHOCYTES NFR BLD: 3 % (ref 12–49)
MCH RBC QN AUTO: 39.7 PG (ref 26–34)
MCHC RBC AUTO-ENTMCNC: 35.8 G/DL (ref 30–36.5)
MCV RBC AUTO: 110.9 FL (ref 80–99)
MONOCYTES # BLD: 0.7 K/UL (ref 0–1)
MONOCYTES NFR BLD: 6 % (ref 5–13)
NEUTS SEG # BLD: 10.7 K/UL (ref 1.8–8)
NEUTS SEG NFR BLD: 91 % (ref 32–75)
NRBC # BLD: 0.02 K/UL (ref 0–0.01)
NRBC BLD-RTO: 0.2 PER 100 WBC
PLATELET # BLD AUTO: 207 K/UL (ref 150–400)
PMV BLD AUTO: 9.4 FL (ref 8.9–12.9)
POTASSIUM SERPL-SCNC: 4.3 MMOL/L (ref 3.5–5.1)
PROT SERPL-MCNC: 8.4 G/DL (ref 6.4–8.2)
RBC # BLD AUTO: 3.93 M/UL (ref 3.8–5.2)
RBC MORPH BLD: ABNORMAL
RBC MORPH BLD: ABNORMAL
SODIUM SERPL-SCNC: 130 MMOL/L (ref 136–145)
TROPONIN I SERPL-MCNC: <0.05 NG/ML
WBC # BLD AUTO: 11.8 K/UL (ref 3.6–11)

## 2021-07-04 PROCEDURE — 80053 COMPREHEN METABOLIC PANEL: CPT

## 2021-07-04 PROCEDURE — 96374 THER/PROPH/DIAG INJ IV PUSH: CPT

## 2021-07-04 PROCEDURE — 96361 HYDRATE IV INFUSION ADD-ON: CPT

## 2021-07-04 PROCEDURE — 36415 COLL VENOUS BLD VENIPUNCTURE: CPT

## 2021-07-04 PROCEDURE — 74011250636 HC RX REV CODE- 250/636: Performed by: EMERGENCY MEDICINE

## 2021-07-04 PROCEDURE — 85025 COMPLETE CBC W/AUTO DIFF WBC: CPT

## 2021-07-04 PROCEDURE — 74011000250 HC RX REV CODE- 250: Performed by: EMERGENCY MEDICINE

## 2021-07-04 PROCEDURE — 83690 ASSAY OF LIPASE: CPT

## 2021-07-04 PROCEDURE — 84484 ASSAY OF TROPONIN QUANT: CPT

## 2021-07-04 PROCEDURE — 93005 ELECTROCARDIOGRAM TRACING: CPT

## 2021-07-04 PROCEDURE — 99284 EMERGENCY DEPT VISIT MOD MDM: CPT

## 2021-07-04 PROCEDURE — 74011250637 HC RX REV CODE- 250/637: Performed by: EMERGENCY MEDICINE

## 2021-07-04 RX ORDER — ONDANSETRON 2 MG/ML
4 INJECTION INTRAMUSCULAR; INTRAVENOUS
Status: COMPLETED | OUTPATIENT
Start: 2021-07-04 | End: 2021-07-04

## 2021-07-04 RX ORDER — ONDANSETRON 4 MG/1
4 TABLET, FILM COATED ORAL
Qty: 15 TABLET | Refills: 0 | Status: SHIPPED | OUTPATIENT
Start: 2021-07-04 | End: 2021-08-10

## 2021-07-04 RX ORDER — PANTOPRAZOLE SODIUM 40 MG/1
40 TABLET, DELAYED RELEASE ORAL DAILY
Qty: 20 TABLET | Refills: 0 | Status: SHIPPED | OUTPATIENT
Start: 2021-07-04 | End: 2021-07-24

## 2021-07-04 RX ADMIN — SODIUM CHLORIDE 1000 ML: 900 INJECTION, SOLUTION INTRAVENOUS at 17:36

## 2021-07-04 RX ADMIN — SODIUM CHLORIDE 1000 ML: 9 INJECTION, SOLUTION INTRAVENOUS at 17:13

## 2021-07-04 RX ADMIN — ONDANSETRON 4 MG: 2 INJECTION INTRAMUSCULAR; INTRAVENOUS at 17:13

## 2021-07-04 RX ADMIN — ALUMINUM HYDROXIDE AND MAGNESIUM HYDROXIDE 40 ML: 200; 200 SUSPENSION ORAL at 17:49

## 2021-07-04 NOTE — DISCHARGE INSTRUCTIONS
Follow up with your physician for further evaluation in 1-2 days.     Return to ED with worsening of condition or new concerning symptoms Pt is being referred to for an Implantable Cardioverter- Defibrillator (ICD).    The PA has discussed and allowed the pt to ask questions regarding ICD.  Pt was provided with a Shared Decision ICD booklet by PA.    Electronically signed by SEDRICK Mcmahon, 03/26/19, 8:31 AM.

## 2021-07-04 NOTE — ED PROVIDER NOTES
EMERGENCY DEPARTMENT HISTORY AND PHYSICAL EXAM      Date: 7/4/2021  Patient Name: Yvonne Márquez    History of Presenting Illness     Chief Complaint   Patient presents with    Vomiting     Into triage via wheelchair with c/o feeling dehydrated and vomiting x 2 days, after a week long alcohol binge (last drink 7/2) d/t some increased anxiety. Reports alcohol abuse \"since I was 13\" - usually drinks one to two 8 ounce glasses of vodka per day, but takes 1-2 week breaks at times and has never withdrawn. Recently diagnosed with neuropathy in hands and feet. HPI: Yvonne Márquez, 39 y.o. female history of alcohol abuse presenting to ED 2 days after her last drink. She states she was on a 5-day moseley prior to that where she consumed only alcohol for 5 days. She woke up sometime during the night Friday night and has been vomiting ever since. She denies any abdominal pain. Vomiting is nonbloody and nonbilious. She does not have diarrhea. There are no other complaints, changes, or physical findings at this time. PCP: Feliciano Santana MD    No current facility-administered medications on file prior to encounter. Current Outpatient Medications on File Prior to Encounter   Medication Sig Dispense Refill    traZODone (DESYREL) 100 mg tablet TAKE 1 TABLET BY MOUTH EVERY NIGHT 90 Tablet 1    lactulose (CHRONULAC) 10 gram/15 mL solution Take 15 mL by mouth three (3) times daily as needed (constipation). 236 mL 1    docusate sodium (COLACE) 100 mg capsule Take 1 Cap by mouth two (2) times a day. Stool softener. 60 Cap 1    psyllium (METAMUCIL) powd Use 17 gm mixed with 8 oz water once daily. 420 g 1    pregabalin (LYRICA) 75 mg capsule Take 1 Cap by mouth two (2) times a day. Max Daily Amount: 150 mg. For the first week, take 1 tab at bedtime only then increase to twice a day 60 Cap 2    levonorgestrel (MIRENA) 20 mcg/24 hr (5 years) IUD 1 Each by IntraUTERine route once.          Past History Past Medical History:  Past Medical History:   Diagnosis Date    Bartholin cyst     Infectious disease 2012    MRSA in left and right axillary       Past Surgical History:  Past Surgical History:   Procedure Laterality Date    HX OTHER SURGICAL Left 2019    Elbow       Family History:  Family History   Problem Relation Age of Onset    No Known Problems Mother     No Known Problems Father     Psoriasis Sister     Bipolar Disorder Brother     Schizophrenia Brother     Heart Disease Maternal Uncle     Heart Disease Maternal Grandmother     Heart Disease Maternal Grandfather        Social History:  Social History     Tobacco Use    Smoking status: Current Every Day Smoker     Packs/day: 1.00     Years: 15.00     Pack years: 15.00    Smokeless tobacco: Never Used   Substance Use Topics    Alcohol use: Yes     Alcohol/week: 5.8 standard drinks     Types: 7 Shots of liquor per week     Comment: 2-3/week    Drug use: No       Allergies:  No Known Allergies      Review of Systems   Review of Systems   Constitutional: Negative for chills and fever. HENT: Negative for rhinorrhea. Eyes: Negative for redness. Respiratory: Negative for shortness of breath. Cardiovascular: Negative for chest pain. Gastrointestinal: Positive for nausea and vomiting. Negative for abdominal pain. Genitourinary: Negative for dysuria. Musculoskeletal: Negative for back pain. Neurological: Negative for syncope. Psychiatric/Behavioral: The patient is not nervous/anxious. All other systems reviewed and are negative. Physical Exam   Physical Exam  Vitals and nursing note reviewed. Constitutional:       Appearance: Normal appearance. HENT:      Head: Normocephalic and atraumatic. Mouth/Throat:      Mouth: Mucous membranes are moist.   Eyes:      Extraocular Movements: Extraocular movements intact. Cardiovascular:      Rate and Rhythm: Regular rhythm. Tachycardia present. Pulses: Normal pulses. Pulmonary:      Effort: Pulmonary effort is normal.      Breath sounds: Normal breath sounds. Abdominal:      Palpations: Abdomen is soft. Tenderness: There is no abdominal tenderness. Musculoskeletal:         General: No deformity. Cervical back: Neck supple. Skin:     General: Skin is warm and dry. Neurological:      General: No focal deficit present. Mental Status: She is alert. Psychiatric:         Mood and Affect: Mood normal.         Behavior: Behavior normal.         Diagnostic Study Results     Labs -     Recent Results (from the past 24 hour(s))   CBC WITH AUTOMATED DIFF    Collection Time: 07/04/21  4:50 PM   Result Value Ref Range    WBC 11.8 (H) 3.6 - 11.0 K/uL    RBC 3.93 3.80 - 5.20 M/uL    HGB 15.6 11.5 - 16.0 g/dL    HCT 43.6 35.0 - 47.0 %    .9 (H) 80.0 - 99.0 FL    MCH 39.7 (H) 26.0 - 34.0 PG    MCHC 35.8 30.0 - 36.5 g/dL    RDW 13.4 11.5 - 14.5 %    PLATELET 646 895 - 138 K/uL    MPV 9.4 8.9 - 12.9 FL    NRBC 0.2 (H) 0  WBC    ABSOLUTE NRBC 0.02 (H) 0.00 - 0.01 K/uL    NEUTROPHILS 91 (H) 32 - 75 %    LYMPHOCYTES 3 (L) 12 - 49 %    MONOCYTES 6 5 - 13 %    EOSINOPHILS 0 0 - 7 %    BASOPHILS 0 0 - 1 %    IMMATURE GRANULOCYTES 0 0.0 - 0.5 %    ABS. NEUTROPHILS 10.7 (H) 1.8 - 8.0 K/UL    ABS. LYMPHOCYTES 0.4 (L) 0.8 - 3.5 K/UL    ABS. MONOCYTES 0.7 0.0 - 1.0 K/UL    ABS. EOSINOPHILS 0.0 0.0 - 0.4 K/UL    ABS. BASOPHILS 0.0 0.0 - 0.1 K/UL    ABS. IMM.  GRANS. 0.0 0.00 - 0.04 K/UL    DF SMEAR SCANNED      RBC COMMENTS MACROCYTOSIS  1+        RBC COMMENTS POLYCHROMASIA  PRESENT       METABOLIC PANEL, COMPREHENSIVE    Collection Time: 07/04/21  4:50 PM   Result Value Ref Range    Sodium 130 (L) 136 - 145 mmol/L    Potassium 4.3 3.5 - 5.1 mmol/L    Chloride 95 (L) 97 - 108 mmol/L    CO2 22 21 - 32 mmol/L    Anion gap 13 5 - 15 mmol/L    Glucose 171 (H) 65 - 100 mg/dL    BUN 11 6 - 20 MG/DL    Creatinine 1.06 (H) 0.55 - 1.02 MG/DL    BUN/Creatinine ratio 10 (L) 12 - 20 GFR est AA >60 >60 ml/min/1.73m2    GFR est non-AA 59 (L) >60 ml/min/1.73m2    Calcium 10.1 8.5 - 10.1 MG/DL    Bilirubin, total 6.0 (H) 0.2 - 1.0 MG/DL    ALT (SGPT) 115 (H) 12 - 78 U/L    AST (SGOT) 158 (H) 15 - 37 U/L    Alk. phosphatase 105 45 - 117 U/L    Protein, total 8.4 (H) 6.4 - 8.2 g/dL    Albumin 4.3 3.5 - 5.0 g/dL    Globulin 4.1 (H) 2.0 - 4.0 g/dL    A-G Ratio 1.0 (L) 1.1 - 2.2     TROPONIN I    Collection Time: 07/04/21  4:50 PM   Result Value Ref Range    Troponin-I, Qt. <0.05 <0.05 ng/mL   LIPASE    Collection Time: 07/04/21  4:50 PM   Result Value Ref Range    Lipase 76 73 - 393 U/L   EKG, 12 LEAD, INITIAL    Collection Time: 07/04/21  5:57 PM   Result Value Ref Range    Ventricular Rate 114 BPM    Atrial Rate 114 BPM    P-R Interval 124 ms    QRS Duration 76 ms    Q-T Interval 334 ms    QTC Calculation (Bezet) 460 ms    Calculated P Axis 40 degrees    Calculated R Axis 18 degrees    Calculated T Axis 28 degrees    Diagnosis       Sinus tachycardia  Possible Left atrial enlargement  No previous ECGs available  Confirmed by Luke Bermudez ([id number]) on 7/5/2021 11:52:54 AM         Radiologic Studies -   No orders to display     CT Results  (Last 48 hours)    None        CXR Results  (Last 48 hours)    None            Medical Decision Making   I am the first provider for this patient. I reviewed the vital signs, available nursing notes, past medical history, past surgical history, family history and social history. Vital Signs-Reviewed the patient's vital signs. Patient Vitals for the past 24 hrs:   Temp Pulse Resp BP SpO2   07/04/21 1900 98.5 °F (36.9 °C) (!) 112 14 109/74 93 %   07/04/21 1800 98.7 °F (37.1 °C) (!) 112 18 125/84 97 %   07/04/21 1554 98.4 °F (36.9 °C) (!) 151 13 123/81 99 %         Provider Notes (Medical Decision Making):   Very pleasant 40 yo lady pw multiple episodes NBNB vomiting starting after several day ETOH binge.  Tachycardic on arrival but entirely nonseptic in appearance. Abd S/NT. Labs suggestive of dehydration, which fits symptoms of limited po intake other than ETOH and vomiting. Given zofran and feeling much better, resting comfortably. No vomiting noted in ED, po challenged successfully. Given IVF. Labs otherwise reassuring. Mild tachycardia persists, does not sound like withdrawal as pt insists she regularly stops drinking for days at a time with no trouble, but it was considered. No SOA or CP suggestive of PE. Pt feeling better and wants to go home. I think she is safe to do so with close return precautions. Discussed ETOH cessation and PCP f/u. EKG sinus, rate 114, nonspecific ST T changes, normal QT    ED Course:     Initial assessment performed. The patients presenting problems have been discussed, and they are in agreement with the care plan formulated and outlined with them. I have encouraged them to ask questions as they arise throughout their visit. Disposition:  dc    PLAN:  1. Discharge Medication List as of 7/4/2021  7:31 PM      START taking these medications    Details   ondansetron hcl (Zofran) 4 mg tablet Take 1 Tablet by mouth every eight (8) hours as needed for Nausea or Vomiting., Print, Disp-15 Tablet, R-0      pantoprazole (Protonix) 40 mg tablet Take 1 Tablet by mouth daily for 20 days. , Print, Disp-20 Tablet, R-0         CONTINUE these medications which have NOT CHANGED    Details   traZODone (DESYREL) 100 mg tablet TAKE 1 TABLET BY MOUTH EVERY NIGHT, Normal, Disp-90 Tablet, R-1      lactulose (CHRONULAC) 10 gram/15 mL solution Take 15 mL by mouth three (3) times daily as needed (constipation). , Normal, Disp-236 mL, R-1      docusate sodium (COLACE) 100 mg capsule Take 1 Cap by mouth two (2) times a day. Stool softener., Normal, Disp-60 Cap, R-1      psyllium (METAMUCIL) powd Use 17 gm mixed with 8 oz water once daily. , Normal, Disp-420 g, R-1      pregabalin (LYRICA) 75 mg capsule Take 1 Cap by mouth two (2) times a day.  Max Daily Amount: 150 mg. For the first week, take 1 tab at bedtime only then increase to twice a day, Normal, Disp-60 Cap, R-2      levonorgestrel (MIRENA) 20 mcg/24 hr (5 years) IUD 1 Each by IntraUTERine route once., Historical Med           2.    Follow-up Information    None       Return to ED if worse     Diagnosis     Clinical Impression: Dehydration, vomiting

## 2021-07-05 LAB
ATRIAL RATE: 114 BPM
CALCULATED P AXIS, ECG09: 40 DEGREES
CALCULATED R AXIS, ECG10: 18 DEGREES
CALCULATED T AXIS, ECG11: 28 DEGREES
DIAGNOSIS, 93000: NORMAL
P-R INTERVAL, ECG05: 124 MS
Q-T INTERVAL, ECG07: 334 MS
QRS DURATION, ECG06: 76 MS
QTC CALCULATION (BEZET), ECG08: 460 MS
VENTRICULAR RATE, ECG03: 114 BPM

## 2021-07-09 RX ORDER — PREGABALIN 75 MG/1
75 CAPSULE ORAL 2 TIMES DAILY
Qty: 60 CAPSULE | Refills: 2 | Status: SHIPPED | OUTPATIENT
Start: 2021-07-09 | End: 2022-02-11 | Stop reason: SDUPTHER
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
## 2021-08-10 ENCOUNTER — OFFICE VISIT (OUTPATIENT)
Dept: URGENT CARE | Age: 36
End: 2021-08-10
Payer: MEDICAID

## 2021-08-10 VITALS
DIASTOLIC BLOOD PRESSURE: 74 MMHG | RESPIRATION RATE: 18 BRPM | SYSTOLIC BLOOD PRESSURE: 102 MMHG | HEART RATE: 112 BPM | TEMPERATURE: 98.8 F | OXYGEN SATURATION: 97 %

## 2021-08-10 DIAGNOSIS — S92.515A CLOSED NONDISPLACED FRACTURE OF PROXIMAL PHALANX OF LESSER TOE OF LEFT FOOT, INITIAL ENCOUNTER: Primary | ICD-10-CM

## 2021-08-10 DIAGNOSIS — S90.122A CONTUSION OF LESSER TOE OF LEFT FOOT WITHOUT DAMAGE TO NAIL, INITIAL ENCOUNTER: ICD-10-CM

## 2021-08-10 PROCEDURE — 99212 OFFICE O/P EST SF 10 MIN: CPT | Performed by: FAMILY MEDICINE

## 2021-08-10 NOTE — PATIENT INSTRUCTIONS
Follow up with PCP     Broken Toe: Care Instructions  Your Care Instructions  You have broken (fractured) a bone in your toe. This kind of fracture does not need a special cast or brace. \"Hua-taping\" your broken toe to a healthy toe next to it is almost always enough to treat the problem and ease symptoms. The toe may take 4 weeks or more to heal.  You heal best when you take good care of yourself. Eat a variety of healthy foods, and don't smoke. Follow-up care is a key part of your treatment and safety. Be sure to make and go to all appointments, and call your doctor if you are having problems. It's also a good idea to know your test results and keep a list of the medicines you take. How can you care for yourself at home? · Be safe with medicines. Take pain medicines exactly as directed. ? If the doctor gave you a prescription medicine for pain, take it as prescribed. ? If you are not taking a prescription pain medicine, ask your doctor if you can take an over-the-counter medicine. · If your toe is taped to the toe next to it, your doctor has shown you how to change the tape. Protect the skin by putting something soft, such as felt or foam, between your toes before you tape them together. Never tape the toes together skin-to-skin. Your broken toe may need to be hua-taped for 2 to 4 weeks to heal.  · Rest and protect your toe. Do not walk on it until you can do so without too much pain. If the doctor has told you to use crutches, use them as instructed. · Put ice or a cold pack on your toe for 10 to 20 minutes at a time. Try to do this every 1 to 2 hours for the next 3 days (when you are awake) or until the swelling goes down. Put a thin cloth between the ice and your skin. · Prop up your foot on a pillow when you ice it or anytime you sit or lie down. Try to keep it above the level of your heart. This will help reduce swelling. · Make sure you go to your follow-up appointments.  Your doctor will need to check that your toe is healing right. When should you call for help? Call your doctor now or seek immediate medical care if:    · You have severe pain.     · Your toe is cool or pale or changes color.     · You have tingling, weakness, or numbness in your toe. Watch closely for changes in your health, and be sure to contact your doctor if:    · Pain and swelling get worse.     · You are not getting better as expected. Where can you learn more? Go to http://www.gray.com/  Enter [id number] in the search box to learn more about \"Broken Toe: Care Instructions. \"  Current as of: November 16, 2020               Content Version: 12.8  © 2006-2021 [employer]. Care instructions adapted under license by [email or web address] (which disclaims liability or warranty for this information). If you have questions about a medical condition or this instruction, always ask your healthcare professional. [address] any warranty or liability for your use of this information.

## 2021-08-10 NOTE — PROGRESS NOTES
Satish Stevenson is a 39 y.o. female who sustained a left 5th toe injury 6 days ago. Mechanism of injury: hit toe against table. Immediate symptoms: dislocated toe and family member was able to reduce it back into place, had immediate pain, swelling, bruising. Symptoms have been constant since that time. Has taken tylenol and ibuprofen. The history is provided by the patient. Past Medical History:   Diagnosis Date    Bartholin cyst     Infectious disease 2012    MRSA in left and right axillary        Past Surgical History:   Procedure Laterality Date    HX OTHER SURGICAL Left 2019    Elbow         Family History   Problem Relation Age of Onset    No Known Problems Mother     No Known Problems Father     Psoriasis Sister     Bipolar Disorder Brother     Schizophrenia Brother     Heart Disease Maternal Uncle     Heart Disease Maternal Grandmother     Heart Disease Maternal Grandfather         Social History     Socioeconomic History    Marital status: SINGLE     Spouse name: Not on file    Number of children: Not on file    Years of education: Not on file    Highest education level: Not on file   Occupational History    Not on file   Tobacco Use    Smoking status: Current Every Day Smoker     Packs/day: 1.00     Years: 15.00     Pack years: 15.00    Smokeless tobacco: Never Used   Substance and Sexual Activity    Alcohol use: Yes     Alcohol/week: 5.8 standard drinks     Types: 7 Shots of liquor per week     Comment: 2-3/week    Drug use: No    Sexual activity: Yes     Birth control/protection: Condom, I.U.D. Other Topics Concern    Not on file   Social History Narrative    Not on file     Social Determinants of Health     Financial Resource Strain:     Difficulty of Paying Living Expenses:    Food Insecurity:     Worried About Running Out of Food in the Last Year:     [address] in the Last Year:    Transportation Needs:     Lack of Transportation (Medical):      Lack of Transportation (Non-Medical):    Physical Activity:     Days of Exercise per Week:     Minutes of Exercise per Session:    Stress:     Feeling of Stress :    Social Connections:     Frequency of Communication with Friends and Family:     Frequency of Social Gatherings with Friends and Family:     Attends Synagogue Services:     Active Member of Clubs or Organizations:     Attends Club or Organization Meetings:     Marital Status:    Intimate Partner Violence:     Fear of Current or Ex-Partner:     Emotionally Abused:     Physically Abused:     Sexually Abused: ALLERGIES: Patient has no known allergies. Review of Systems   Musculoskeletal: Positive for gait problem and joint swelling. Skin: Positive for color change. Vitals:    08/10/21 1335   BP: 102/74   Pulse: (!) 112   Resp: 18   Temp: 98.8 °F (37.1 °C)   SpO2: 97%       Physical Exam  Vitals and nursing note reviewed. Constitutional:       General: She is not in acute distress. Appearance: She is well-developed. She is not diaphoretic. Pulmonary:      Effort: Pulmonary effort is normal.   Musculoskeletal:        Feet:    Neurological:      Mental Status: She is alert. Psychiatric:         Behavior: Behavior normal.         Thought Content: Thought content normal.         Judgment: Judgment normal.         MDM    ICD-10-CM ICD-9-CM   1. Contusion of lesser toe of left foot without damage to nail, initial encounter  S90.122A 924.3       Orders Placed This Encounter    XR 5TH TOE LT MIN 2 V     Standing Status:   Future     Number of Occurrences:   1     Standing Expiration Date:   8/11/2022     Order Specific Question:   Is Patient Pregnant?      Answer:   No     Order Specific Question:   Reason for Exam     Answer:   Hit 5th left toe 6 days ago against table; toe was dislocated, family member put back into place, still painful     Ibuprofen as needed  Keep buddy taped along with cast shoe x 4 weeks    The patient is to follow up with PCP    If signs and symptoms become worse the pt is to go to the ER. .XR Results (most recent):  Results from Appointment encounter on 08/10/21    XR 5TH TOE LT MIN 2 V    Narrative  EXAM: XR 5TH TOE LT MIN 2 V    INDICATION: Left foot fifth digit pain after injury 6 days ago. COMPARISON: 6/18/2019. FINDINGS: 2 views of the left fifth toe demonstrate an acute nondisplaced  transverse extra-articular fracture at the base of the fifth proximal phalanx. The joint spaces are maintained. The soft tissues are unremarkable. Impression  Acute nondisplaced fracture at the base of the fifth proximal  phalanx.       Procedures

## 2021-10-08 ENCOUNTER — OFFICE VISIT (OUTPATIENT)
Dept: INTERNAL MEDICINE CLINIC | Age: 36
End: 2021-10-08
Payer: MEDICAID

## 2021-10-08 VITALS
WEIGHT: 129 LBS | BODY MASS INDEX: 22.86 KG/M2 | OXYGEN SATURATION: 94 % | HEIGHT: 63 IN | SYSTOLIC BLOOD PRESSURE: 113 MMHG | DIASTOLIC BLOOD PRESSURE: 81 MMHG | HEART RATE: 113 BPM | RESPIRATION RATE: 16 BRPM | TEMPERATURE: 98.9 F

## 2021-10-08 DIAGNOSIS — Z97.5 IUD (INTRAUTERINE DEVICE) IN PLACE: ICD-10-CM

## 2021-10-08 DIAGNOSIS — G62.9 POLYNEUROPATHY: ICD-10-CM

## 2021-10-08 DIAGNOSIS — F51.04 CHRONIC INSOMNIA: Primary | ICD-10-CM

## 2021-10-08 PROCEDURE — 99214 OFFICE O/P EST MOD 30 MIN: CPT | Performed by: INTERNAL MEDICINE

## 2021-10-08 NOTE — PROGRESS NOTES
Identified pt with two pt identifiers. Reviewed record in preparation for visit and have obtained necessary documentation. All patient medications has been reviewed. Chief Complaint   Patient presents with    Insomnia    Peripheral Neuropathy     Additional information about chief complaint:    Visit Vitals  /81 (BP 1 Location: Left upper arm, BP Patient Position: Sitting, BP Cuff Size: Large adult)   Pulse (!) 113   Temp 98.9 °F (37.2 °C) (Oral)   Resp 16   Ht 5' 3\" (1.6 m)   Wt 129 lb (58.5 kg)   SpO2 94%   BMI 22.85 kg/m²       Health Maintenance Due   Topic    Hepatitis C Screening     Cervical cancer screen     Flu Vaccine (1)       1. Have you been to the ER, urgent care clinic since your last visit? Hospitalized since your last visit? Yes 8/10/21Urgent Care for broken toe and 7/4/21 ER for vomiting     2. Have you seen or consulted any other health care providers outside of the [address] [place] [address] since your last visit? Include any pap smears or colon screening.  NO     bdg

## 2021-10-08 NOTE — PROGRESS NOTES
CC:   Chief Complaint   Patient presents with    Insomnia    Peripheral Neuropathy       HISTORY OF PRESENT ILLNESS  Lilia Duverney is a 39 y.o. female. Presents for 6 month follow up evaluation. She has depression, anxiety, chronic insomnia, tobacco use, and alcohol dependence. Still having numbness and tingling from neuropathy affecting her fingers and toes. Lyrica 75 mg helps a little; only taking it once a day though prescribed twice a day. Uses a cane to walk long distances. Has been back to work the past 2-3 months. Has Mirena IUD in place that  in 2019. At last clinic visit, she was instructed to return to the health department to have it removed and have a Pap smear. Forgot to do this. Denies depressed mood and anxiety. Sleeping well with trazodone. 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 Flu vaccine: declined    Soc Hx  Single. No children. 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 Smokes 1 ppd. Drinks 1.5-2 glasses of Vodka daily. Denies recreational drug use. No formal exercise but stays active. ROS  A complete review of systems was performed and is negative except for those mentioned in the HPI. Patient Active Problem List   Diagnosis Code    Hypomagnesemia E83.42    Folate deficiency E53.8    Ingrown nail L60.0    Macrocytosis without anemia D75.89    Hypokalemia E87.6    Alcohol abuse F10.10     Past Medical History:   Diagnosis Date    Bartholin cyst     Infectious disease     MRSA in left and right axillary     No Known Allergies    Current Outpatient Medications   Medication Sig Dispense Refill    pregabalin (LYRICA) 75 mg capsule Take 1 Capsule by mouth two (2) times a day. Max Daily Amount: 150 mg.  For the first week, take 1 tab at bedtime only then increase to twice a day 60 Capsule 2    traZODone (DESYREL) 100 mg tablet TAKE 1 TABLET BY MOUTH EVERY NIGHT 90 Tablet 1    levonorgestrel (MIRENA) 20 mcg/24 hr (5 years) IUD 1 Each by IntraUTERine route once. PHYSICAL EXAM  Visit Vitals  /81 (BP 1 Location: Left upper arm, BP Patient Position: Sitting, BP Cuff Size: Large adult)   Pulse (!) 113   Temp 98.9 °F (37.2 °C) (Oral)   Resp 16   Ht 5' 3\" (1.6 m)   Wt 129 lb (58.5 kg)   SpO2 94%   BMI 22.85 kg/m²       General: Well-developed and well-nourished, no distress. Smells of alcohol. Ambulating with a cane. HEENT:  Head normocephalic/atraumatic, no scleral icterus  Lungs:  Clear to ausculation bilaterally. Good air movement. Heart:  Tachycardic, regular rhythm, normal S1 and S2, no murmur, gallop, or rub  Extremities: No clubbing, cyanosis, or edema. Neurological: Alert and oriented. Psychiatric: Normal mood and affect. Behavior is normal.         ASSESSMENT AND PLAN    ICD-10-CM ICD-9-CM    1. Chronic insomnia  F51.04 780.52    2. Polyneuropathy  G62.9 356.9    3. IUD (intrauterine device) in place  Z97.5 V45.51      Diagnoses and all orders for this visit:    1. Chronic insomnia  Controlled. Continue trazodone. 2. Polyneuropathy  Instructed to increase Lyrica to twice a day and to schedule follow up appointment with Dr. Bishop Mcclain.    3. IUD (intrauterine device) in place  Instructed to contact health department clinic to have  IUD removed. Showed her the phone number on her Mirena IUD card. Follow-up and Dispositions    · Return in about 6 months (around 2022), or if symptoms worsen or fail to improve, for Annual physical exam.           I have discussed the diagnosis with the patient and the intended plan as seen in the above orders. Patient is in agreement. The patient has received an after-visit summary and questions were answered concerning future plans. I have discussed medication side effects and warnings with the patient as well.

## 2021-10-08 NOTE — PATIENT INSTRUCTIONS
Learning About Peripheral Neuropathy  What is peripheral neuropathy? Peripheral neuropathy is a problem that affects the peripheral nerves. These nerves lead from the spinal cord to other parts of the body. They control your sense of touch, how you feel pain and temperature, and your muscle strength. Most of the time the problem starts in the fingers and toes. As it gets worse, it moves into the limbs. It can cause pain and loss of feeling in the feet, legs, and hands. What causes it? There are several causes:  · Diabetes. This is the most common cause. If your blood sugar is too high for too long, it can damage the nerves. · Kidney problems. These can lead to toxic substances in the blood that damage nerves. · Low levels of thyroid hormone (hypothyroidism). This may cause swelling of the tissues around the nerves, which can put pressure on them. · Infectious or inflammatory diseases. Examples are HIV and Guillain-Barré syndrome. These diseases can damage the nerves. · Peripheral nerve injury. A physical injury can damage the nerves. Injuries can be from things like falls, car crashes, or playing sports. · Overusing alcohol and not eating a healthy diet. These can lead to your body not having enough of certain vitamins, such as vitamin B-12. This can damage nerves. · Being exposed to toxic substances. These include lead, mercury, and certain medicines, such as those used for chemotherapy. Sometimes the cause isn't known. What are the symptoms? Symptoms of peripheral neuropathy can occur slowly over time. The most common ones are:  · Numbness, tightness, and tingling, especially in the legs, hands, and feet. · Loss of feeling. · Burning, shooting, or stabbing pain in the legs, hands, and feet. Often the pain is worse at night. · Weakness and loss of balance. What can happen if you have it? If peripheral neuropathy gets worse, it can lead to a complete lack of feeling in your hands or feet. This can make you more likely to injure them. It may lead to calluses and blisters. It can also lead to bone and joint problems, infection, and ulcers. For instance, small, repeated injuries to the foot may lead to bigger problems. This can happen because you can't feel the injuries. Reduced feeling in the feet can also change your step, leading to bone or joint problems. If untreated, foot problems can become so severe that the foot or lower leg may have to be amputated. But treatment can slow down peripheral neuropathy. And it's a good idea to take care to avoid injury. How is it diagnosed? To diagnose peripheral neuropathy, your doctor will ask you about:  · Your symptoms. · Your medical history. This may include your use of alcohol, risk of HIV infection, or exposure to toxic substances. · Your family's medical history, including nerve disease. Your doctor may test how well you can feel touch, temperature, and pain. You may also have blood tests. These tests will help the doctor find out if you have conditions that can cause neuropathy. Examples are diabetes, vitamin deficiencies, thyroid disease, and kidney problems. How is it treated? Treatment for peripheral neuropathy can relieve symptoms. This is done by treating the health problem that's causing it. For example, if you have diabetes, keeping your blood sugar within your target range may help. Or maybe your body lacks certain vitamins caused by drinking too much alcohol. In that case, treatment may include eating a healthy diet, taking vitamins, and stopping alcohol use. You may have physical therapy. This can increase muscle strength and help build muscle control. Over-the-counter medicine can relieve mild nerve pain. Your doctor may also prescribe medicine to help with severe pain, numbness, tingling, and weakness. If you have neuropathy in your feet, it's a good idea to have them checked during each office visit.  This can help prevent problems. Some people find that physical therapy, acupuncture, or transcutaneous electrical nerve stimulation (TENS) helps relieve pain. Follow-up care is a key part of your treatment and safety. Be sure to make and go to all appointments, and call your doctor if you are having problems. It's also a good idea to know your test results and keep a list of the medicines you take. Where can you learn more? Go to http://www.gray.com/  Enter P130 in the search box to learn more about \"Learning About Peripheral Neuropathy. \"  Current as of: August 31, 2020               Content Version: 13.0  © 4744-4173 [employer]. Care instructions adapted under license by [employer] (which disclaims liability or warranty for this information). If you have questions about a medical condition or this instruction, always ask your healthcare professional. [address] any warranty or liability for your use of this information.

## 2021-11-18 DIAGNOSIS — G62.89 AXONAL NEUROPATHY: Primary | ICD-10-CM

## 2021-11-18 DIAGNOSIS — G62.9 SENSORY NEUROPATHY: ICD-10-CM

## 2021-11-30 ENCOUNTER — OFFICE VISIT (OUTPATIENT)
Dept: INTERNAL MEDICINE CLINIC | Age: 36
End: 2021-11-30
Payer: MEDICAID

## 2021-11-30 VITALS
HEART RATE: 109 BPM | HEIGHT: 63 IN | SYSTOLIC BLOOD PRESSURE: 122 MMHG | WEIGHT: 135.8 LBS | OXYGEN SATURATION: 92 % | DIASTOLIC BLOOD PRESSURE: 84 MMHG | BODY MASS INDEX: 24.06 KG/M2 | TEMPERATURE: 98.2 F | RESPIRATION RATE: 16 BRPM

## 2021-11-30 DIAGNOSIS — L73.8 HOT TUB FOLLICULITIS: Primary | ICD-10-CM

## 2021-11-30 PROCEDURE — 99213 OFFICE O/P EST LOW 20 MIN: CPT | Performed by: INTERNAL MEDICINE

## 2021-11-30 RX ORDER — CIPROFLOXACIN 750 MG/1
750 TABLET, FILM COATED ORAL 2 TIMES DAILY
Qty: 14 TABLET | Refills: 0 | Status: SHIPPED | OUTPATIENT
Start: 2021-11-30 | End: 2021-12-20 | Stop reason: SDUPTHER

## 2021-11-30 RX ORDER — PREGABALIN 75 MG/1
75 CAPSULE ORAL 2 TIMES DAILY
Qty: 60 CAPSULE | Refills: 2 | Status: CANCELLED | OUTPATIENT
Start: 2021-11-30

## 2021-11-30 NOTE — PROGRESS NOTES
CC:   Chief Complaint   Patient presents with    Skin Problem     Room 3C // spots on body - face, ankles, lower back and in between breasts // been about 1 month        HISTORY OF PRESENT ILLNESS  Sherren Jensen is a 39 y.o. female. Complains of red spots on her body that she first noticed a month ago. Spread diffusely at face, ankles, lower back, and between breasts. Not itchy. Started after getting a hot tub a month ago. Goes in naked alone. Denies new lotions, soaps, detergents, cleaning products, foods, or ill contacts. Patient Active Problem List   Diagnosis Code    Hypomagnesemia E83.42    Folate deficiency E53.8    Ingrown nail L60.0    Macrocytosis without anemia D75.89    Hypokalemia E87.6    Alcohol abuse F10.10     Past Medical History:   Diagnosis Date    Bartholin cyst     Infectious disease 2012    MRSA in left and right axillary     No Known Allergies    Current Outpatient Medications   Medication Sig Dispense Refill    pregabalin (LYRICA) 75 mg capsule Take 1 Capsule by mouth two (2) times a day. Max Daily Amount: 150 mg. For the first week, take 1 tab at bedtime only then increase to twice a day 60 Capsule 2    traZODone (DESYREL) 100 mg tablet TAKE 1 TABLET BY MOUTH EVERY NIGHT 90 Tablet 1    levonorgestrel (MIRENA) 20 mcg/24 hr (5 years) IUD 1 Each by IntraUTERine route once. PHYSICAL EXAM  Visit Vitals  /84 (BP 1 Location: Left upper arm, BP Patient Position: Sitting, BP Cuff Size: Large adult)   Pulse (!) 109   Temp 98.2 °F (36.8 °C) (Oral)   Resp 16   Ht 5' 3\" (1.6 m)   Wt 135 lb 12.8 oz (61.6 kg)   SpO2 92%   BMI 24.06 kg/m²       General: Well-developed and well-nourished, no distress. HEENT:  Head normocephalic/atraumatic, no scleral icterus  Skin: 2-5 mm erythematous slightly scaly lesions distributed at face, between breasts, lower back, and ankles. Neurological: Alert and oriented. Psychiatric: Normal mood and affect.  Behavior is normal. ASSESSMENT AND PLAN    ICD-10-CM ICD-9-CM    1. Hot tub folliculitis  G65.5 680.3 ciprofloxacin HCl (CIPRO) 750 mg tablet     Diagnoses and all orders for this visit:    1. Hot tub folliculitis  Likely hot tub related rash. .  -     Start ciprofloxacin HCl (CIPRO) 750 mg tablet; Take 1 Tablet by mouth two (2) times a day for 7 days. Do NOT take trazodone for 7 days while on ciprofloxacin. Provided her with handout from [facility] with following recommendations: To reduce the risk of hot tub rash:   Remove your swimsuit and shower with soap  after getting out of the water, especially hot  tubs/spas.  Wash your swimsuit after using it.  Use test strips to check the water in the  hot tub/spa or pool yourself for adequate  disinfectant level and pH.  CDC recommends the following disinfectant and  pH levels:  » Disinfectant  - Hot Tubs/Spas: free chlorine 310 parts  per million (ppm or mg/L) or bromine  48 ppm  - Pools: free chlorine 110 ppm or bromine  38 ppm  » pH  - Both hot tubs/spas and pools: pH 7.27.8  If chlorine or bromine level or pH is not within  the correct range, tell the hot tub/spa or pool   or owner immediately. Follow-up and Dispositions    · Return if symptoms worsen or fail to improve, for Scheduled appointment on 4/8/22. I have discussed the diagnosis with the patient and the intended plan as seen in the above orders. Patient is in agreement. The patient has received an after-visit summary and questions were answered concerning future plans. I have discussed medication side effects and warnings with the patient as well.

## 2021-11-30 NOTE — PROGRESS NOTES
Toyin Dolan  Identified pt with two pt identifiers(name and ). Chief Complaint   Patient presents with    Skin Problem     Room 3C // spots on body - face, ankles, lower back and in between breasts // been about 1 month        Reviewed record In preparation for visit and have obtained necessary documentation. 1. Have you been to the ER, urgent care clinic or hospitalized since your last visit? No     2. Have you seen or consulted any other health care providers outside of the [address] [place] [address] since your last visit? Include any pap smears or colon screening. No    Patient does not have an advance directive. Vitals reviewed with provider.     Health Maintenance reviewed:     Health Maintenance Due   Topic    Hepatitis C Screening     Cervical cancer screen           Wt Readings from Last 3 Encounters:   21 135 lb 12.8 oz (61.6 kg)   10/08/21 129 lb (58.5 kg)   21 120 lb 5.9 oz (54.6 kg)        Temp Readings from Last 3 Encounters:   21 98.2 °F (36.8 °C) (Oral)   10/08/21 98.9 °F (37.2 °C) (Oral)   08/10/21 98.8 °F (37.1 °C)        BP Readings from Last 3 Encounters:   21 122/84   10/08/21 113/81   08/10/21 102/74        Pulse Readings from Last 3 Encounters:   21 (!) 109   10/08/21 (!) 113   08/10/21 (!) 112        Vitals:    21 1403   BP: 122/84   Pulse: (!) 109   Resp: 16   Temp: 98.2 °F (36.8 °C)   TempSrc: Oral   SpO2: 92%   Weight: 135 lb 12.8 oz (61.6 kg)   Height: 5' 3\" (1.6 m)   PainSc:   0 - No pain          Learning Assessment:   :       Learning Assessment 2014   PRIMARY LEARNER Patient   HIGHEST LEVEL OF EDUCATION - PRIMARY LEARNER  GRADUATED HIGH SCHOOL OR GED   BARRIERS PRIMARY LEARNER NONE   CO-LEARNER CAREGIVER No   PRIMARY LANGUAGE ENGLISH    NEED No   LEARNER PREFERENCE PRIMARY DEMONSTRATION   ANSWERED BY patient   RELATIONSHIP SELF        Depression Screening:   :       3 most recent PHQ Screens 2021   Little interest or pleasure in doing things Not at all   Feeling down, depressed, irritable, or hopeless Not at all   Total Score PHQ 2 0        Fall Risk Assessment:   :       Fall Risk Assessment, last 12 mths 2/12/2021   Able to walk? Yes   Fall in past 12 months? 1   Do you feel unsteady? 1   Are you worried about falling 1   Is the gait abnormal? 1   Number of falls in past 12 months 2   Fall with injury? 0        Abuse Screening:   :     No flowsheet data found.      ADL Screening:   :       ADL Assessment 1/20/2017   Feeding yourself No Help Needed   Getting from bed to chair No Help Needed   Getting dressed No Help Needed   Bathing or showering No Help Needed   Walk across the room (includes cane/walker) No Help Needed   Using the telphone No Help Needed   Taking your medications No Help Needed   Preparing meals No Help Needed   Managing money (expenses/bills) No Help Needed   Moderately strenuous housework (laundry) No Help Needed   Shopping for personal items (toiletries/medicines) No Help Needed   Shopping for groceries No Help Needed   Driving No Help Needed   Climbing a flight of stairs No Help Needed   Getting to places beyond walking distances No Help Needed

## 2021-12-14 ENCOUNTER — HOSPITAL ENCOUNTER (OUTPATIENT)
Dept: PHYSICAL THERAPY | Age: 36
Discharge: HOME | End: 2021-12-14
Payer: MEDICAID

## 2021-12-14 PROCEDURE — 97162 PT EVAL MOD COMPLEX 30 MIN: CPT

## 2021-12-14 NOTE — PROGRESS NOTES
PT INITIAL EVALUATION NOTE - [facility] 2-15    Patient Name: Beto Jamil  Date:2021  : 1985  [x]  Patient  Verified  Payor: Ally Fee / Plan: [id number] / Product Type: Managed Care Medicaid /    In time:  2:07 PM  Out time:  2:50 PM  Total Treatment Time (min): 43 minutes  Total Timed Codes (min): 0 minutes  1:1 Treatment Time ( [address] only):  0 minutes   Visit #: 1     Treatment Area: Neuropathy [G62.9]    SUBJECTIVE  Pain Level (0-10 scale): 0/10  Any medication changes, allergies to medications, adverse drug reactions, diagnosis change, or new procedure performed?: [] No    [x] Yes (see summary sheet for update)  Subjective: The Pt reports that she has had numbness/tingling in her hands and feet for the last 1.5 years. She was diagnosed with neuropathy in 2021. She reports that she has been experiencing problems with gait and balance due to the numbness in her feet. She reports that the numbness is in her feet bilaterally along the entire foot (sock), but it worst on the bottom. She denies any pain in her LEs. She complains of instability in her LEs and feels R knee gives out more frequently. She complains of frequent falls (1-2x/month) and tripping and stumbling. She has a SPC that she uses on occasion, but does not like to use it because she is embarrassed. She has increased problems with her balance when she turns around quickly and walking on uneven surfaces. She has noticed more problems with balance 1st thing in the morning. She lives in a 1-story home with 4 stairs (has to use a handrail and occasional has to take them 1step at a time). 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 She is working 5 days/wk for 20 hrs total. She likes to swim recreationally in the summer. PMH: neuropathy. She is taking Lyrica 1x/day.     OBJECTIVE/EXAMINATION  Gait and Functional Mobility:  Ataxic, slight steppage gait, R mild medial heel whip    Lumbar ROM:   Flexion:  Moderate   Extension: Severe   Side Bending: Right: NT   Left: NT   Rotation: Right: Mild    Left: WFL    Balance Assessment: Severe deficits in balance and neuromuscular control in single limb stance bilaterally    Squat Assessment:   Double Leg NT   Single Leg NT    Neurological: Sensations: Intact to light touch sensation L1-S1 (significant decreased sensation along bottom of feet bilaterally- able to feel, but less)    Flexibility: NT    Right Knee:  AROM WFL   Left  Knee:  AROM WFL     LOWER QUARTER   MUSCLE STRENGTH  KEY       R  L  0 - No Contraction  Hip flex  5  4+  1 - Trace          er    4  4-  2 - Poor          ir   4+  4  3 - Fair           abd  NT  NT  4 - Good          ext  NT  NT  5 - Normal   Knee flex  4+  4               Ext  5  4      Ankle DF  5  5                PF  5  5          Joint Mobility Assessment: NT  Palpation: NT    Special Tests:Varus: NT     SLR: NT    Valgus: NT     Slump: NT    Bhavin's: NT    Antonio: NT    Anterior Drawer: NT    Obers: NT    Posterior Drawer: NT    Clonus: NT    Lachman's: NT    30s sit to stand: 12 (no hands)    MCTSIB: trial 1- 30s, trial 2- 26s, trial 3- 30s, trial 4- 10s (96/120)    With   [] TE   [] TA   [] Neuro   [] SC   [x] other: Patient Education: [x] Review HEP    [] Progressed/Changed HEP based on:   [] positioning   [] body mechanics   [] transfers   [] heat/ice application    [x] other: Pt educated on diagnosis and prognosis with therapy     Other Objective/Functional Measures: FOTO Functional Measure: 16/100                Pain Level (0-10 scale) post treatment: 0/10    ASSESSMENT/Changes in Function:     [x]  See Plan of Care      Mehran Kimbrough, PT 12/14/2021

## 2021-12-14 NOTE — PROGRESS NOTES
[facility]  [address] ([address]), [address] [facility]  [address] [place] [address] Ne, [address]  Phone: [phone] Fax: [phone]    Plan of Care/Statement of Necessity for Physical Therapy Services  2-15    Patient name: Escobar Wharton  : 1985  Provider#: [id number]  Referral source: Starling Her A, DO      Medical/Treatment Diagnosis: Neuropathy [G62.9]     Prior Hospitalization: see medical history     Comorbidities: Anxiety or panic disorders, depression  Prior Level of Function: The patient completed 20 minutes of exercise seldom or never  Medications: Verified on Patient Summary List    Start of Care: 21      Onset Date:        The Plan of Care and following information is based on the information from the initial evaluation. Assessment/ key information: The Pt is a pleasant and motivated 39year old female who presents to therapy with gait and balance deficits due to her diagnosis of neuropathy. Based on examination, the Pt also displays decreased L LE strength and stability, decreased core strength and stability, decreased neuromuscular control, and decreased activity tolerance and endurance. The patient would benefit from skilled physical therapy to help improve the above listed impairments to allow the patient to safely return to their prior level of function with less overall pain or risk of further injury. The patient has a good prognosis with skilled physical therapy. Evaluation Complexity History MEDIUM  Complexity : 1-2 comorbidities / personal factors will impact the outcome/ POC ; Examination MEDIUM Complexity : 3 Standardized tests and measures addressing body structure, function, activity limitation and / or participation in recreation  ;Presentation MEDIUM Complexity : Evolving with changing characteristics  ; Clinical Decision Making HIGH Complexity : FOTO score of 1- 25   Overall Complexity Rating: MEDIUM    Problem List: pain affecting function, decrease ROM, decrease strength, impaired gait/ balance, decrease ADL/ functional abilitiies, decrease activity tolerance, decrease flexibility/ joint mobility and decrease transfer abilities   Treatment Plan may include any combination of the following: Therapeutic exercise, Therapeutic activities, Neuromuscular re-education, Physical agent/modality, Gait/balance training, Manual therapy, Patient education, Self Care training, Functional mobility training, Home safety training and Stair training  Patient / Family readiness to learn indicated by: asking questions and interest  Persons(s) to be included in education: patient (P)  Barriers to Learning/Limitations: None  Patient Goal (s): walking normal  Patient Self Reported Health Status: good  Rehabilitation Potential: good    Short Term Goals: To be accomplished in 6 treatments:  1. The Pt will be independent and compliant with their HEP. 2. The Pt will report a 50% reduction in their pain with ADLs. Long Term Goals: To be accomplished in 16 treatments:  1. The Pt will score the MCII on her FOTO survey demonstrating improved overall function (16 to 39 points). 2. The Pt will score >/= 110/120 on the MCTSIB demonstrating improved balance and neuromuscular control. 3. The Pt will report a 50% reduction in falling. Frequency / Duration: Patient to be seen 1-2 times per week for 16 treatments. Patient/ Caregiver education and instruction: self care and activity modification    [x]  Plan of care has been reviewed with JAYRO Smart, PT 12/14/2021     ________________________________________________________________________    I certify that the above Therapy Services are being furnished while the patient is under my care. I agree with the treatment plan and certify that this therapy is necessary.     [phone] Signature:____________________  Date:____________Time: _________      Alee Akhtar DO

## 2021-12-20 ENCOUNTER — TELEPHONE (OUTPATIENT)
Dept: INTERNAL MEDICINE CLINIC | Age: 36
End: 2021-12-20

## 2021-12-20 DIAGNOSIS — L73.8 HOT TUB FOLLICULITIS: ICD-10-CM

## 2021-12-20 RX ORDER — TRIAMCINOLONE ACETONIDE 1 MG/G
CREAM TOPICAL 2 TIMES DAILY
Qty: 30 G | Refills: 0 | Status: SHIPPED | OUTPATIENT
Start: 2021-12-20 | End: 2021-12-30

## 2021-12-20 RX ORDER — CIPROFLOXACIN 750 MG/1
750 TABLET, FILM COATED ORAL 2 TIMES DAILY
Qty: 14 TABLET | Refills: 0 | Status: SHIPPED | OUTPATIENT
Start: 2021-12-20 | End: 2021-12-27

## 2021-12-20 NOTE — TELEPHONE ENCOUNTER
----- Message from Jolanta Whittaker sent at 12/20/2021  9:49 AM EST -----  Subject: Refill Request    QUESTIONS  Name of Medication? ciprofloxacin HCl (CIPRO) 750 mg tablet  Patient-reported dosage and instructions? 750  How many days do you have left? 0  [facility]erred [facility]? [id number]  Pharmacy phone number (if available)? [phone]  Additional Information for Provider? Patient ran out and needs another one   symptoms almost gone but not quite done   ---------------------------------------------------------------------------  --------------  CALL BACK INFO  What is the best way for the office to contact you? OK to leave message on   voicemail  Preferred Call Back Phone Number?  [phone]

## 2021-12-20 NOTE — TELEPHONE ENCOUNTER
I called the patient and verified them by name and date of birth. Her face has cleared up but the spots on body are not going anywhere. Took the full prescription of Cipro.

## 2021-12-30 ENCOUNTER — VIRTUAL VISIT (OUTPATIENT)
Dept: INTERNAL MEDICINE CLINIC | Age: 36
End: 2021-12-30
Payer: MEDICAID

## 2021-12-30 DIAGNOSIS — K04.7 DENTAL INFECTION: Primary | ICD-10-CM

## 2021-12-30 PROCEDURE — 99441 PR PHYS/QHP TELEPHONE EVALUATION 5-10 MIN: CPT | Performed by: NURSE PRACTITIONER

## 2021-12-30 RX ORDER — AMOXICILLIN 500 MG/1
500 CAPSULE ORAL 3 TIMES DAILY
Qty: 21 CAPSULE | Refills: 0 | Status: SHIPPED | OUTPATIENT
Start: 2021-12-30 | End: 2022-01-06

## 2021-12-30 RX ORDER — IBUPROFEN 800 MG/1
800 TABLET ORAL
Qty: 40 TABLET | Refills: 0 | Status: SHIPPED | OUTPATIENT
Start: 2021-12-30 | End: 2022-05-12 | Stop reason: SDUPTHER

## 2021-12-30 NOTE — PATIENT INSTRUCTIONS
Abscessed Tooth: Care Instructions  Your Care Instructions     An abscessed tooth is a tooth that has a pocket of pus in the tissues around it. Pus forms when the body tries to fight an infection caused by bacteria. If the pus cannot drain, it forms an abscess. An abscessed tooth can cause red, swollen gums and throbbing pain, especially when you chew. You may have a bad taste in your mouth and a fever, and your jaw may swell. Damage to the tooth, untreated tooth decay, or gum disease can cause an abscessed tooth. An abscessed tooth needs to be treated by a dental professional right away. If it is not treated, the infection could spread to other parts of your body. Your dentist will give you antibiotics to stop the infection. He or she may make a hole in the tooth or cut open (estrada) the abscess inside your mouth so that the infection can drain, which should relieve your pain. You may need to have a root canal treatment, which tries to save your tooth by taking out the infected pulp and replacing it with a healing medicine and/or a filling. If these treatments do not work, your tooth may have to be removed. Follow-up care is a key part of your treatment and safety. Be sure to make and go to all appointments, and call your doctor if you are having problems. It's also a good idea to know your test results and keep a list of the medicines you take. How can you care for yourself at home? · Reduce pain and swelling in your face and jaw by putting ice or a cold pack on the outside of your cheek. Do this for 10 to 20 minutes at a time. Put a thin cloth between the ice and your skin. · Take pain medicines exactly as directed. ? If the doctor gave you a prescription medicine for pain, take it as prescribed. ? If you are not taking a prescription pain medicine, ask your doctor if you can take an over-the-counter medicine. · Take antibiotics as directed. Do not stop taking them just because you feel better.  You need to take the full course of antibiotics. To prevent tooth abscess  · Brush and floss every day. Have regular dental checkups. · Eat a healthy diet. Avoid sugary foods and drinks. · Do not smoke or vape with nicotine. And don't use spit tobacco. Tobacco and nicotine slow your ability to heal. They increase your risk for gum disease and cancer of the mouth and throat. If you need help quitting, talk to your doctor about stop-smoking programs and medicines. These can increase your chances of quitting for good. When should you call for help? Call 911 anytime you think you may need emergency care. For example, call if:    · You have trouble breathing. Call your doctor now or seek immediate medical care if:    · You have new or worse symptoms of infection, such as:  ? Increased pain, swelling, warmth, or redness. ? Red streaks leading from the area. ? Pus draining from the area. ? A fever. Watch closely for changes in your health, and be sure to contact your doctor if:    · You do not get better as expected. Where can you learn more? Go to http://www.gray.com/  Enter L466 in the search box to learn more about \"Abscessed Tooth: Care Instructions. \"  Current as of: June 30, 2021               Content Version: 13.0  © 5353-7431 [employer]. Care instructions adapted under license by [employer] (which disclaims liability or warranty for this information). If you have questions about a medical condition or this instruction, always ask your healthcare professional. [place] [address] any warranty or liability for your use of this information.

## 2021-12-30 NOTE — PROGRESS NOTES
Toyin Dolan  Identified pt with two pt identifiers(name and ). Chief Complaint   Patient presents with    Dental Injury     x 7 days // started on the left side (went away in about 2 weeks) // now on right side // been taking ibuprofen every 4 to 6 hours - it helps a little // pain -  out of 10       Reviewed record In preparation for visit and have obtained necessary documentation. 1. Have you been to the ER, urgent care clinic or hospitalized since your last visit? No     2. Have you seen or consulted any other health care providers outside of the [address] [place] [address] since your last visit? Include any pap smears or colon screening. No    Patient does not have an advance directive. Vitals reviewed with provider. Health Maintenance reviewed:     Health Maintenance Due   Topic    Hepatitis C Screening     Cervical cancer screen           Wt Readings from Last 3 Encounters:   21 135 lb 12.8 oz (61.6 kg)   10/08/21 129 lb (58.5 kg)   21 120 lb 5.9 oz (54.6 kg)        Temp Readings from Last 3 Encounters:   21 98.2 °F (36.8 °C) (Oral)   10/08/21 98.9 °F (37.2 °C) (Oral)   08/10/21 98.8 °F (37.1 °C)        BP Readings from Last 3 Encounters:   21 122/84   10/08/21 113/81   08/10/21 102/74        Pulse Readings from Last 3 Encounters:   21 (!) 109   10/08/21 (!) 113   08/10/21 (!) 112      There were no vitals filed for this visit.        Learning Assessment:   :       Learning Assessment 2014   PRIMARY LEARNER Patient   HIGHEST LEVEL OF EDUCATION - PRIMARY LEARNER  GRADUATED HIGH SCHOOL OR GED   BARRIERS PRIMARY LEARNER NONE   CO-LEARNER CAREGIVER No   PRIMARY LANGUAGE ENGLISH    NEED No   LEARNER PREFERENCE PRIMARY DEMONSTRATION   ANSWERED BY patient   RELATIONSHIP SELF        Depression Screening:   :       3 most recent PHQ Screens 2021   Little interest or pleasure in doing things Not at all   Feeling down, depressed, irritable, or hopeless Not at all   Total Score PHQ 2 0        Fall Risk Assessment:   :       Fall Risk Assessment, last 12 mths 2/12/2021   Able to walk? Yes   Fall in past 12 months? 1   Do you feel unsteady? 1   Are you worried about falling 1   Is the gait abnormal? 1   Number of falls in past 12 months 2   Fall with injury? 0        Abuse Screening:   :     No flowsheet data found.      ADL Screening:   :       ADL Assessment 1/20/2017   Feeding yourself No Help Needed   Getting from bed to chair No Help Needed   Getting dressed No Help Needed   Bathing or showering No Help Needed   Walk across the room (includes cane/walker) No Help Needed   Using the telphone No Help Needed   Taking your medications No Help Needed   Preparing meals No Help Needed   Managing money (expenses/bills) No Help Needed   Moderately strenuous housework (laundry) No Help Needed   Shopping for personal items (toiletries/medicines) No Help Needed   Shopping for groceries No Help Needed   Driving No Help Needed   Climbing a flight of stairs No Help Needed   Getting to places beyond walking distances No Help Needed

## 2021-12-30 NOTE — PROGRESS NOTES
Pily San is a 39 y.o. female, evaluated via audio-only technology on 12/30/2021 for Dental Injury (x 7 days // started on the left side (went away in about 2 weeks) // now on right side // been taking ibuprofen every 4 to 6 hours - it helps a little // pain - 8/9 out of 10)  . Assessment & Plan:   Diagnoses and all orders for this visit:    1. Dental infection  -     ibuprofen (MOTRIN) 800 mg tablet; Take 1 Tablet by mouth every eight (8) hours as needed for Pain.  -     amoxicillin (AMOXIL) 500 mg capsule; Take 1 Capsule by mouth three (3) times daily for 7 days. Take with food. Fu with dentist          12  Subjective:     C/o dental pain. Happens 1-2/year for the past 10 years, normally improves with amoxicillin. Needs her wisdom teeth pulled. Pain was on left side and is now on right, pain is 8-9/10. Denies fevers or chills. Denies abscess or pus near tooth. Mouthwash hurt yesterday. Gum is sensitive. Does not have dental insurance so wisdom tooth removal is very expensive. Prior to Admission medications    Medication Sig Start Date End Date Taking? Authorizing Provider   pregabalin (LYRICA) 75 mg capsule Take 1 Capsule by mouth two (2) times a day. Max Daily Amount: 150 mg. For the first week, take 1 tab at bedtime only then increase to twice a day 7/9/21  Yes Puneet Biggs MD   traZODone (DESYREL) 100 mg tablet TAKE 1 TABLET BY MOUTH EVERY NIGHT 6/28/21  Yes Oscar Small MD   levonorgestrel (MIRENA) 20 mcg/24 hr (5 years) IUD 1 Each by IntraUTERine route once. Yes Provider, Historical   triamcinolone acetonide (KENALOG) 0.1 % topical cream Apply  to affected area two (2) times a day.  Use thin layer  Patient not taking: Reported on 12/30/2021 12/20/21 12/30/21  Oscar Small MD         ROS see hpi    Patient-Reported Vitals 12/30/2021   Patient-Reported Weight 125lb       Pily San, who was evaluated through a patient-initiated, synchronous (real-time) audio only encounter, and/or her healthcare decision maker, is aware that it is a billable service, with coverage as determined by her insurance carrier. She provided verbal consent to proceed: Yes. She has not had a related appointment within my department in the past 7 days or scheduled within the next 24 hours.         Willy Shultz NP

## 2022-01-06 ENCOUNTER — HOSPITAL ENCOUNTER (OUTPATIENT)
Dept: PHYSICAL THERAPY | Age: 37
Discharge: HOME | End: 2022-01-06
Payer: MEDICAID

## 2022-01-06 PROCEDURE — 97110 THERAPEUTIC EXERCISES: CPT

## 2022-01-06 NOTE — PROGRESS NOTES
PT DAILY TREATMENT NOTE 2-15    Patient Name: Riaz Waters  Date:2022  : 1985  [x]  Patient  Verified  Payor: Chalmer Gilford / Plan: [employer] / Product Type: Managed Care Medicaid /    In time:300  Out time:358  Total Treatment Time (min): 62  Visit #:  2    Treatment Area: Neuropathy [G62.9]    SUBJECTIVE  Pain Level (0-10 scale): 0/10  Any medication changes, allergies to medications, adverse drug reactions, diagnosis change, or new procedure performed?: [x] No    [] Yes (see summary sheet for update)  Subjective functional status/changes:   [] No changes reported  Patient reports both knees will buckle on her throughout the day, but she has always been able to catch herself. OBJECTIVE    58 min Therapeutic Exercise:  [x] See flow sheet :   Rationale: increase ROM and increase strength to improve the patients ability to perform ADLs and reduce pain levels     With   [] TE   [] TA   [] Neuro   [] SC   [] other: Patient Education: [x] Review HEP    [] Progressed/Changed HEP based on:   [] positioning   [] body mechanics   [] transfers   [] heat/ice application    [] other:      Other Objective/Functional Measures: none noted     Pain Level (0-10 scale) post treatment: 0/10    ASSESSMENT/Changes in Function:   Fatigued quickly with glute strengthening. Slight discomfort in the knees during stair hip hike. tolerated all therex well, will continue to progress as tolerated. Patient will continue to benefit from skilled PT services to modify and progress therapeutic interventions, address functional mobility deficits, address ROM deficits, address strength deficits, analyze and address soft tissue restrictions, analyze and cue movement patterns, analyze and modify body mechanics/ergonomics and assess and modify postural abnormalities to attain remaining goals.      []  See Plan of Care  []  See progress note/recertification  []  See Discharge Summary         Progress towards goals / Updated goals:  Patient is progressing towards goals.      PLAN  [x]  Upgrade activities as tolerated     [x]  Continue plan of care  [x]  Update interventions per flow sheet       []  Discharge due to:_  []  Other:_      Anu Pool PTA 1/6/2022

## 2022-01-10 ENCOUNTER — HOSPITAL ENCOUNTER (OUTPATIENT)
Dept: PHYSICAL THERAPY | Age: 37
Discharge: HOME | End: 2022-01-10
Payer: MEDICAID

## 2022-01-10 PROCEDURE — 97110 THERAPEUTIC EXERCISES: CPT

## 2022-01-10 NOTE — PROGRESS NOTES
PT DAILY TREATMENT NOTE 2-15    Patient Name: Poli Rizo  Date:1/10/2022  : 1985  [x]  Patient  Verified   Payor: Cassie Tamez / Plan: Norah Given / Product Type: Managed Care Medicaid /    In time:1038  Out time:1126  Total Treatment Time (min): 48  Visit #:  3    Treatment Area: Neuropathy [G62.9]    SUBJECTIVE  Pain Level (0-10 scale): 0/10  Any medication changes, allergies to medications, adverse drug reactions, diagnosis change, or new procedure performed?: [x] No    [] Yes (see summary sheet for update)  Subjective functional status/changes:   [] No changes reported  Patient reports she was quite sore after last visit. OBJECTIVE    48 min Therapeutic Exercise:  [x] See flow sheet :   Rationale: increase ROM and increase strength to improve the patients ability to perform ADLs and reduce pain levels     With   [] TE   [] TA   [] Neuro   [] SC   [] other: Patient Education: [x] Review HEP    [] Progressed/Changed HEP based on:   [] positioning   [] body mechanics   [] transfers   [] heat/ice application    [] other:      Other Objective/Functional Measures: none noted     Pain Level (0-10 scale) post treatment: 0/10    ASSESSMENT/Changes in Function:   Patient had greater ease with all HEP. Continues to fatigue quickly with sit to stands. Tolerated all therex well, will continue to progress as tolerated. Patient will continue to benefit from skilled PT services to modify and progress therapeutic interventions, address functional mobility deficits, address ROM deficits, address strength deficits, analyze and address soft tissue restrictions, analyze and cue movement patterns, analyze and modify body mechanics/ergonomics and assess and modify postural abnormalities to attain remaining goals. []  See Plan of Care  []  See progress note/recertification  []  See Discharge Summary         Progress towards goals / Updated goals:  Patient is progressing towards goals.      PLAN  [x] Upgrade activities as tolerated     [x]  Continue plan of care  [x]  Update interventions per flow sheet       []  Discharge due to:_  []  Other:_      Tierney Wagner, PTA 1/10/2022

## 2022-01-13 ENCOUNTER — HOSPITAL ENCOUNTER (OUTPATIENT)
Dept: PHYSICAL THERAPY | Age: 37
Discharge: HOME | End: 2022-01-13
Payer: MEDICAID

## 2022-01-13 PROCEDURE — 97110 THERAPEUTIC EXERCISES: CPT

## 2022-01-13 NOTE — PROGRESS NOTES
PT DAILY TREATMENT NOTE 2-15    Patient Name: David Calvillo  Date:2022  : 1985  [x]  Patient  Verified   Payor: Molly Macias / Plan: Beltran Durbin / Product Type: Managed Care Medicaid /    In time:330  Out time:421  Total Treatment Time (min): 51  Visit #:  4    Treatment Area: Neuropathy [G62.9]    SUBJECTIVE  Pain Level (0-10 scale): 0/10  Any medication changes, allergies to medications, adverse drug reactions, diagnosis change, or new procedure performed?: [x] No    [] Yes (see summary sheet for update)  Subjective functional status/changes:   [] No changes reported  Patient reports she has been keeping up with her HEP well. Patient reports the only major issue she has is getting out of bed first thing in the morning. It is stiffness that gives her the biggest issue with this transition. By the time she gets to the bathroom she is back to normal.   She was taking pictures in a standing position and in a hinged hip position, nearly instantly her legs began to get shaky. She feels like she is walking straighter and feeling more stable with activities. Numbness has remained the same. She has not been able to take her medication as constant as prescribed because it makes her feel out of it at work. Patient believes she is 30% better since the start of therapy. Primarily improved gait mechanics. She has not had a fall since new years hussain at work. OBJECTIVE    51 min Therapeutic Exercise:  [x] See flow sheet :   Rationale: increase ROM and increase strength to improve the patients ability to perform ADLs and reduce pain levels     With   [] TE   [] TA   [] Neuro   [] SC   [] other: Patient Education: [x] Review HEP    [] Progressed/Changed HEP based on:   [] positioning   [] body mechanics   [] transfers   [] heat/ice application    [] other:      Other Objective/Functional Measures:    FOTO:45  MCTSIB: trial 1- 30s, trial 2- 30s, trial 3- 30s, trial 4- 11s (101/120)     Pain Level (0-10 scale) post treatment: 0/10    ASSESSMENT/Changes in Function:    Significant swat during trials 2 and 4, unable to withstand on the 4th. Fatigued quickly with glute strengthening. Tolerated all interventions well, will continue to progress as tolerated. Patient will continue to benefit from skilled PT services to modify and progress therapeutic interventions, address functional mobility deficits, address ROM deficits, address strength deficits, analyze and address soft tissue restrictions, analyze and cue movement patterns, analyze and modify body mechanics/ergonomics and assess and modify postural abnormalities to attain remaining goals. []  See Plan of Care  [x]  See progress note/recertification  []  See Discharge Summary         Progress towards goals / Updated goals:  Short Term Goals: To be accomplished in 6 treatments:  1. The Pt will be independent and compliant with their HEP. Met   2. The Pt will report a 50% reduction in their pain with ADLs. Met   Long Term Goals: To be accomplished in 16 treatments:  1. The Pt will score the MCII on her FOTO survey demonstrating improved overall function (16 to 39 points). Met   2. The Pt will score >/= 110/120 on the MCTSIB demonstrating improved balance and neuromuscular control. Progressing towards  3. The Pt will report a 50% reduction in falling.  Met     PLAN  [x]  Upgrade activities as tolerated     [x]  Continue plan of care  [x]  Update interventions per flow sheet       []  Discharge due to:_  []  Other:_      Steffi Gregory, PTA 1/13/2022

## 2022-01-17 ENCOUNTER — APPOINTMENT (OUTPATIENT)
Dept: PHYSICAL THERAPY | Age: 37
End: 2022-01-17
Payer: MEDICAID

## 2022-01-18 NOTE — PROGRESS NOTES
[facility]. Physical Therapy  [address] [facility] [place] [address] [address] [place]  Phone: [phone] Fax: [phone]    Progress Note    Name: Anthony Rodrigues   : 1985   MD: Bridgett Burrows,        Treatment Diagnosis: Neuropathy [G62.9]  Start of Care: 21    Visits from Start of Care: 4  Missed Visits: 0    Summary of Care: The Pt has been seen for 4 outpatient physical therapy sessions with gait and balance impairments and LE weakness. The Pt's therapy program has focused on improving her hip strength and stability, improving her core strength and stability, improving her balance and neuromuscular control, correcting her gait mechanics, and improving her activity tolerance and endurance via therapeutic exercises. The Pt reports that she has noticed improved strength in her LEs and feels like she is walking straighter with less \"sway\". She reports improved stability in her LEs with her ADLs and work duties. The numbness in her LEs has remained the same and has not changed, however, she has not been able to take her medication as frequently as prescribed because it makes her \"feel out of it\" at work. She is compliant with her HEP. She does continue to have a lot of stiffness/soreness in her LEs with the first few steps first thing in the morning and it makes her mobility more difficult in the morning. She has not had a fall since [place] Years [place] while at work. Overall, she believes that she is 30% improved since beginning therapy. Recommend continued PT for an additional 12 visits to help progress her remaining impairments to allow the Pt to safely return to her PLOF with less difficulty or fear of falling. Thank you for this referral.     Other Objective/Functional Measures:    FOTO:45  MCTSIB: trial 1- 30s, trial 2- 30s, trial 3- 30s, trial 4- 11s (101/120)    Progress towards goals / Updated goals:  Short Term Goals: To be accomplished in 6 treatments:  1. The Pt will be independent and compliant with their HEP. Met   2. The Pt will report a 50% reduction in their pain with ADLs. Met   Long Term Goals: To be accomplished in 16 treatments:  1. The Pt will score the MCII on her FOTO survey demonstrating improved overall function (16 to 39 points). Met   2. The Pt will score >/= 110/120 on the MCTSIB demonstrating improved balance and neuromuscular control. Progressing towards  3. The Pt will report a 50% reduction in falling.  Met     Daren Mccauley, PT 1/18/2022

## 2022-01-20 ENCOUNTER — APPOINTMENT (OUTPATIENT)
Dept: PHYSICAL THERAPY | Age: 37
End: 2022-01-20
Payer: MEDICAID

## 2022-01-24 ENCOUNTER — HOSPITAL ENCOUNTER (OUTPATIENT)
Dept: PHYSICAL THERAPY | Age: 37
Discharge: HOME | End: 2022-01-24
Payer: MEDICAID

## 2022-01-24 PROCEDURE — 97110 THERAPEUTIC EXERCISES: CPT

## 2022-01-27 ENCOUNTER — HOSPITAL ENCOUNTER (OUTPATIENT)
Dept: PHYSICAL THERAPY | Age: 37
Discharge: HOME | End: 2022-01-27
Payer: MEDICAID

## 2022-01-27 PROCEDURE — 97110 THERAPEUTIC EXERCISES: CPT

## 2022-01-27 NOTE — PROGRESS NOTES
PT DAILY TREATMENT NOTE 2-15    Patient Name: Anthony Rodrigues  Date:2022  : 1985  [x]  Patient  Verified   Payor: Edu Shea / Plan: Bob Ram / Product Type: Managed Care Medicaid /    In time:531  Out time:613  Total Treatment Time (min): 42  Visit #:  6    Treatment Area: Neuropathy [G62.9]    SUBJECTIVE  Pain Level (0-10 scale): 0/10  Any medication changes, allergies to medications, adverse drug reactions, diagnosis change, or new procedure performed?: [x] No    [] Yes (see summary sheet for update)  Subjective functional status/changes:   [] No changes reported  Patient reports she was very sore after lat visit, but then began to feel very good the following days. OBJECTIVE    42 min Therapeutic Exercise:  [x] See flow sheet :   Rationale: increase ROM and increase strength to improve the patients ability to perform ADLs and reduce pain levels     With   [] TE   [] TA   [] Neuro   [] SC   [] other: Patient Education: [x] Review HEP    [] Progressed/Changed HEP based on:   [] positioning   [] body mechanics   [] transfers   [] heat/ice application    [] other:      Other Objective/Functional Measures: none noted     Pain Level (0-10 scale) post treatment: 0/10    ASSESSMENT/Changes in Function:   Patient tolerated all therex well, will continue to progress as tolerated. Patient will continue to benefit from skilled PT services to modify and progress therapeutic interventions, address functional mobility deficits, address ROM deficits, address strength deficits, analyze and address soft tissue restrictions, analyze and cue movement patterns, analyze and modify body mechanics/ergonomics and assess and modify postural abnormalities to attain remaining goals. []  See Plan of Care  []  See progress note/recertification  []  See Discharge Summary         Progress towards goals / Updated goals:  Patient is progressing towards goals.      PLAN  [x]  Upgrade activities as tolerated [x]  Continue plan of care  [x]  Update interventions per flow sheet       []  Discharge due to:_  []  Other:_      Raquel Valencia, JAYRO 1/27/2022

## 2022-01-31 ENCOUNTER — APPOINTMENT (OUTPATIENT)
Dept: PHYSICAL THERAPY | Age: 37
End: 2022-01-31
Payer: MEDICAID

## 2022-02-02 ENCOUNTER — HOSPITAL ENCOUNTER (OUTPATIENT)
Dept: PHYSICAL THERAPY | Age: 37
Discharge: HOME | End: 2022-02-02
Payer: MEDICAID

## 2022-02-02 PROCEDURE — 97110 THERAPEUTIC EXERCISES: CPT

## 2022-02-02 NOTE — PROGRESS NOTES
PT DAILY TREATMENT NOTE 2-15    Patient Name: Jose Crystal  Date:2022  : 1985  [x]  Patient  Verified   Payor: Bentley Soto / Plan: [employer] / Product Type: Managed Care Medicaid /    In time:100  Out time:140  Total Treatment Time (min): 40  Visit #:  7    Treatment Area: Neuropathy [G62.9]    SUBJECTIVE  Pain Level (0-10 scale): 0/10  Any medication changes, allergies to medications, adverse drug reactions, diagnosis change, or new procedure performed?: [x] No    [] Yes (see summary sheet for update)  Subjective functional status/changes:   [] No changes reported  Patient reports she was not able to perform her HEP ad well as in the past due to family reasons     OBJECTIVE    40 min Therapeutic Exercise:  [x] See flow sheet :   Rationale: increase ROM and increase strength to improve the patients ability to perform ADLs and reduce pain levels     With   [] TE   [] TA   [] Neuro   [] SC   [] other: Patient Education: [x] Review HEP    [] Progressed/Changed HEP based on:   [] positioning   [] body mechanics   [] transfers   [] heat/ice application    [] other:      Other Objective/Functional Measures: none noted     Pain Level (0-10 scale) post treatment: 0/10    ASSESSMENT/Changes in Function:   Very difficult with pull down and step ups. Will continue to work on functional stability. Patient will continue to benefit from skilled PT services to modify and progress therapeutic interventions, address functional mobility deficits, address ROM deficits, address strength deficits, analyze and address soft tissue restrictions, analyze and cue movement patterns, analyze and modify body mechanics/ergonomics and assess and modify postural abnormalities to attain remaining goals. []  See Plan of Care  []  See progress note/recertification  []  See Discharge Summary         Progress towards goals / Updated goals:  Patient is progressing towards goals.      PLAN  [x]  Upgrade activities as tolerated     [x]  Continue plan of care  [x]  Update interventions per flow sheet       []  Discharge due to:_  []  Other:_      Chuck Trujillo PTA 2/2/2022

## 2022-02-07 ENCOUNTER — HOSPITAL ENCOUNTER (OUTPATIENT)
Dept: PHYSICAL THERAPY | Age: 37
Discharge: HOME | End: 2022-02-07
Payer: MEDICAID

## 2022-02-07 PROCEDURE — 97112 NEUROMUSCULAR REEDUCATION: CPT

## 2022-02-07 PROCEDURE — 97110 THERAPEUTIC EXERCISES: CPT

## 2022-02-07 NOTE — PROGRESS NOTES
PT DAILY TREATMENT NOTE 2-15    Patient Name: David Calvillo  Date:2022  : 1985  [x]  Patient  Verified  Payor: Molly Macias / Plan: [id number] [employer] / Product Type: Managed Care Medicaid /    In time: 11:40 AM  Out time: 12:44 PM  Total Treatment Time (min): 64 minutes  Visit #:  8    Treatment Area: Neuropathy [G62.9]    SUBJECTIVE  Pain Level (0-10 scale): 5/10  Any medication changes, allergies to medications, adverse drug reactions, diagnosis change, or new procedure performed?: [x] No    [] Yes (see summary sheet for update)  Subjective functional status/changes:   [] No changes reported  The Pt reports that she is always sore after her PT session and then 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 Since Thursday, she has been experienced more pain in her legs and feet. She reports that it is more of an aching sensation in legs equally. She reports that her balance was horrible. She had to use her [facility] yesterday and she still fell- she was walking towards a picnic table and fell into it as she was walking (she was able to catch herself herself). This was the 1st fall since .     OBJECTIVE    30 min Therapeutic Exercise:  [x] See flow sheet :   Rationale: increase ROM, increase strength, improve coordination, improve balance and increase proprioception to improve the patients ability to ambulate and perform ADLs with less pain or difficulty    34 min Neuromuscular Re-education:  [x]  See flow sheet :   Rationale: increase ROM, increase strength, improve coordination, improve balance and increase proprioception  to improve the patients ability to ambulate and perform ADLs with less difficulty or risk of falling    With   [x] TE   [] TA   [] Neuro   [] SC   [] other: Patient Education: [x] Review HEP    [] Progressed/Changed HEP based on:   [] positioning   [] body mechanics   [] transfers   [] heat/ice application    [] other:      Other Objective/Functional Measures: None noted     Pain Level (0-10 scale) post treatment: 0/10    ASSESSMENT/Changes in Function:   The Pt required use of an external support for all balance exercises. She did have difficulty shifting her weight in her feet without using her hips/trunk, but with cuing this was able to be corrected. Will continue to focus on higher level balance training while in the clinic. Patient will continue to benefit from skilled PT services to modify and progress therapeutic interventions, address functional mobility deficits, address ROM deficits, address strength deficits, analyze and address soft tissue restrictions, analyze and cue movement patterns, analyze and modify body mechanics/ergonomics, assess and modify postural abnormalities and instruct in home and community integration to attain remaining goals. []  See Plan of Care  []  See progress note/recertification  []  See Discharge Summary         Progress towards goals / Updated goals:  Short Term Goals: To be accomplished in 6 treatments:  1. The Pt will be independent and compliant with their HEP.  Met   2. The Pt will report a 50% reduction in their pain with ADLs.  Met   Long Term Goals: To be accomplished in 16 treatments:  1. The Pt will score the MCII on her FOTO survey demonstrating improved overall function (16 to 39 points). Met   2. The Pt will score >/= 110/120 on the MCTSIB demonstrating improved balance and neuromuscular control.  Progressing towards  3.  The Pt will report a 50% reduction in falling. Met     PLAN  [x]  Upgrade activities as tolerated     [x]  Continue plan of care  [x]  Update interventions per flow sheet       []  Discharge due to:_  []  Other:_      Taylor Pozo, PT 2/7/2022

## 2022-02-10 ENCOUNTER — APPOINTMENT (OUTPATIENT)
Dept: PHYSICAL THERAPY | Age: 37
End: 2022-02-10
Payer: MEDICAID

## 2022-02-11 ENCOUNTER — PATIENT MESSAGE (OUTPATIENT)
Dept: INTERNAL MEDICINE CLINIC | Age: 37
End: 2022-02-11

## 2022-02-11 DIAGNOSIS — G62.9 SENSORY NEUROPATHY: ICD-10-CM

## 2022-02-11 NOTE — TELEPHONE ENCOUNTER
PCP: Yonatan Croft MD     Last appt: 12/30/2021   Future Appointments   Date Time Provider Henry Read   2/14/2022 10:30 AM Tiffanie Brito Asa, PTA [facility]   2/17/2022  4:00 PM Rajendra Arzola, PTA [facility]   2/21/2022 10:30 AM Thea Chandra, PT [facility]   2/24/2022  3:30 PM Thea Chandra, PT [facility]   4/8/2022  2:20 PM Yonatan Croft MD [facility]        Requested Prescriptions     Pending Prescriptions Disp Refills    pregabalin (LYRICA) 75 mg capsule 60 Capsule 2     Sig: Take 1 Capsule by mouth two (2) times a day. Max Daily Amount: 150 mg.  For the first week, take 1 tab at bedtime only then increase to twice a day

## 2022-02-12 RX ORDER — PREGABALIN 75 MG/1
75 CAPSULE ORAL 2 TIMES DAILY
Qty: 60 CAPSULE | Refills: 1 | Status: SHIPPED | OUTPATIENT
Start: 2022-02-12 | End: 2022-04-08 | Stop reason: SDUPTHER

## 2022-02-14 ENCOUNTER — HOSPITAL ENCOUNTER (OUTPATIENT)
Dept: PHYSICAL THERAPY | Age: 37
Discharge: HOME | End: 2022-02-14
Payer: MEDICAID

## 2022-02-14 PROCEDURE — 97110 THERAPEUTIC EXERCISES: CPT

## 2022-02-14 PROCEDURE — 97535 SELF CARE MNGMENT TRAINING: CPT

## 2022-02-14 NOTE — PROGRESS NOTES
PT DAILY TREATMENT NOTE 2-15    Patient Name: Zackary Dodge  Date:2022  : 1985  [x]  Patient  Verified  Payor: Dmitriy Solis / Plan: [id number] / Product Type: Managed Care Medicaid /    In time:   Out time: 1130  Total Treatment Time (min): 57 minutes  Visit #:  9    Treatment Area: Neuropathy [G62.9]    SUBJECTIVE  Pain Level (0-10 scale): 0/10  Any medication changes, allergies to medications, adverse drug reactions, diagnosis change, or new procedure performed?: [x] No    [] Yes (see summary sheet for update)  Subjective functional status/changes:   [] No changes reported  Patient reports she has been having increased pain, primarily in the shoulder blade, since the car show. She feels like her instability has been improving. She feels like she is walking better, but will feel like when standing still she will need to hold onto something after around 30 seconds. She will be walking up and down stairs in a reciprocal gait pattern, but feels like going down stairs is much more difficult. Physically she is very fatigued after work. She will go to sleep very shortly after and forget to take her medication. Patient believes she is 60% better in regards to strength, and the same in regards to balance and stability.      OBJECTIVE    10 min Therapeutic Exercise:  [x] See flow sheet :   Rationale: increase ROM, increase strength, improve coordination, improve balance and increase proprioception to improve the patients ability to ambulate and perform ADLs with less pain or difficulty    nt min Neuromuscular Re-education:  [x]  See flow sheet :   Rationale: increase ROM, increase strength, improve coordination, improve balance and increase proprioception  to improve the patients ability to ambulate and perform ADLs with less difficulty or risk of falling    47 min Self Care/Home Management:  [x]  See flow sheet :   Rationale: increase ROM and increase strength  to improve the patients ability to perform ADLs and reduce fall risk    With   [x] TE   [] TA   [] Neuro   [] SC   [] other: Patient Education: [x] Review HEP    [] Progressed/Changed HEP based on:   [] positioning   [] body mechanics   [] transfers   [] heat/ice application    [] other:      Other Objective/Functional Measures: FOTO:45  MCTSIB: trial 1- 30s, trial 2- 30s, trial 3- 30s, trial 4- 10s (100/120)    Pain Level (0-10 scale) post treatment: 0/10    ASSESSMENT/Changes in Function:   Went over how the focus was shifting to more balance and stability. Patient understands good form with her strengthening therex. Will continue to progress as tolerated. Patient will continue to benefit from skilled PT services to modify and progress therapeutic interventions, address functional mobility deficits, address ROM deficits, address strength deficits, analyze and address soft tissue restrictions, analyze and cue movement patterns, analyze and modify body mechanics/ergonomics, assess and modify postural abnormalities and instruct in home and community integration to attain remaining goals. []  See Plan of Care  []  See progress note/recertification  []  See Discharge Summary         Progress towards goals / Updated goals:  Short Term Goals: To be accomplished in 6 treatments:  1. The Pt will be independent and compliant with their HEP.  Met   2. The Pt will report a 50% reduction in their pain with ADLs.  Met   Long Term Goals: To be accomplished in 16 treatments:  1. The Pt will score the MCII on her FOTO survey demonstrating improved overall function (16 to 39 points). Met   2. The Pt will score >/= 110/120 on the MCTSIB demonstrating improved balance and neuromuscular control.  Progressing towards  3.  The Pt will report a 50% reduction in falling. Met     PLAN  [x]  Upgrade activities as tolerated     [x]  Continue plan of care  [x]  Update interventions per flow sheet       []  Discharge due to:_  []  Other:_      Con Brown MELISSA Brito, PTA 2/14/2022

## 2022-02-17 ENCOUNTER — HOSPITAL ENCOUNTER (OUTPATIENT)
Dept: PHYSICAL THERAPY | Age: 37
Discharge: HOME | End: 2022-02-17
Payer: MEDICAID

## 2022-02-17 PROCEDURE — 97110 THERAPEUTIC EXERCISES: CPT

## 2022-02-17 PROCEDURE — 97112 NEUROMUSCULAR REEDUCATION: CPT

## 2022-02-17 NOTE — PROGRESS NOTES
[facility]. Physical Therapy  [address] ([address]), [address] [place]  Phone: [phone] Fax: [phone]    Progress Note    Name: Poli Rizo   : 1985   MD: Sebastian Hernández DO       Treatment Diagnosis: Neuropathy [G62.9]  Start of Care: 21    Visits from Start of Care: 9  Missed Visits: Cancelled 1, No show 1    Summary of Care: The Pt has been seen for 9 outpatient physical therapy sessions with gait and balance impairments and LE weakness. The Pt's therapy program has focused on improving her hip strength and stability, improving her core strength and stability, improving her balance and neuromuscular control, correcting her gait mechanics, and improving her activity tolerance and endurance via therapeutic exercises. The Pt's LE strength is progressing nicely and she is able to do more challenging and functional exercises with less fatigue noted. She has noticed that her stability is improving, but it is slow and gradual.  She still has difficulty on uneven surfaces and if standing still feels like she needs to hold onto sometime after 30s to prevent LOB. Her walking has improved greatly and she no longer feels like she is \"drunk walking\" and can walk straighter. She is able to navigate stairs with a reciprocal gait pattern, but it is significantly more difficult to go down the stairs vs up. Her endurance is improving, but she continues to be very fatigued after work and oftentimes falls asleep very quickly when she gets home and forgets to take her medication. Overall, the Pt believes that she is 60% improved since beginning therapy. Recommend continued PT for an additional  4 sessions to help progress her remaining impairments to allow the Pt to safely return to her PLOF with less pain or risk of falling. Thank you for this referral.     Other Objective/Functional Measures:    FOTO:45/100  MCTSIB: trial 1- 30s, trial 2- 30s, trial 3- 30s, trial 4- 10s (100/120)    Progress towards goals / Updated goals:  Short Term Goals: To be accomplished in 6 treatments:  1. The Pt will be independent and compliant with their HEP.  Met   2. The Pt will report a 50% reduction in their pain with ADLs.  Met   Long Term Goals: To be accomplished in 16 treatments:  1. The Pt will score the MCII on her FOTO survey demonstrating improved overall function (16 to 39 points). Met   2. The Pt will score >/= 110/120 on the MCTSIB demonstrating improved balance and neuromuscular control.  Progressing towards  3.  The Pt will report a 50% reduction in [address], PT 2/17/2022

## 2022-02-17 NOTE — PROGRESS NOTES
PT DAILY TREATMENT NOTE 2-15    Patient Name: Carina Baker  Date:2022  : 1985  [x]  Patient  Verified   Payor: Donald Burkett / Plan: Ange Elder / Product Type: Managed Care Medicaid /    In time:1130  Out time:1231  Total Treatment Time (min): 61  Visit #:  10    Treatment Area: Neuropathy [G62.9]    SUBJECTIVE  Pain Level (0-10 scale): 0/10  Any medication changes, allergies to medications, adverse drug reactions, diagnosis change, or new procedure performed?: [x] No    [] Yes (see summary sheet for update)  Subjective functional status/changes:   [] No changes reported  Patient reports she has been working hard on her balancing therex at home, and it continues to be quite difficult. OBJECTIVE    15 min Therapeutic Exercise:  [x] See flow sheet :   Rationale: increase ROM and increase strength to improve the patients ability to perform ADLs and reduce pain levels     46 min Neuromuscular Re-education:  [x]  See flow sheet :   Rationale: increase ROM, increase strength, improve coordination, improve balance and increase proprioception  to improve the patients ability to perform ADLs and reduce pain levels. With   [] TE   [] TA   [] Neuro   [] SC   [] other: Patient Education: [x] Review HEP    [] Progressed/Changed HEP based on:   [] positioning   [] body mechanics   [] transfers   [] heat/ice application    [] other:      Other Objective/Functional Measures: none noted     Pain Level (0-10 scale) post treatment: 0/10    ASSESSMENT/Changes in Function:   Was able to remain fully erect when performing wobble board DF/PF, no hip strategies but was able to control with just ankle strategies. Slight improvements with tandem stance. Tolerated all therex well, will continue to progress as tolerated.   Patient will continue to benefit from skilled PT services to modify and progress therapeutic interventions, address functional mobility deficits, address ROM deficits, address strength deficits, analyze and address soft tissue restrictions, analyze and cue movement patterns, analyze and modify body mechanics/ergonomics and assess and modify postural abnormalities to attain remaining goals. []  See Plan of Care  []  See progress note/recertification  []  See Discharge Summary         Progress towards goals / Updated goals:  Patient is progressing towards goals.      PLAN  [x]  Upgrade activities as tolerated     [x]  Continue plan of care  [x]  Update interventions per flow sheet       []  Discharge due to:_  []  Other:_      Kami Casillas, PTA 2/17/2022

## 2022-02-21 ENCOUNTER — HOSPITAL ENCOUNTER (OUTPATIENT)
Dept: PHYSICAL THERAPY | Age: 37
Discharge: HOME | End: 2022-02-21
Payer: MEDICAID

## 2022-02-21 PROCEDURE — 97110 THERAPEUTIC EXERCISES: CPT

## 2022-02-21 PROCEDURE — 97112 NEUROMUSCULAR REEDUCATION: CPT

## 2022-02-21 NOTE — PROGRESS NOTES
PT DAILY TREATMENT NOTE 2-15    Patient Name: David Necessary  Date:2022  : 1985  [x]  Patient  Verified  Payor: Arla Cheadle / Plan: [id number] / Product Type: Managed Care Medicaid /    In time:  10:38 AM  Out time: 11:26 AM  Total Treatment Time (min): 48 minutes  Visit #:  11    Treatment Area: Neuropathy [G62.9]    SUBJECTIVE  Pain Level (0-10 scale): 0/10  Any medication changes, allergies to medications, adverse drug reactions, diagnosis change, or new procedure performed?: [x] No    [] Yes (see summary sheet for update)  Subjective functional status/changes:   [] No changes reported  The Pt reports that she had increased calf discomfort after last session that started the next morning and lasted a few days. She reports that her strength feels much better and she continues to walk straight, but the standing balance is still the most effected. OBJECTIVE    13 min Therapeutic Exercise:  [x] See flow sheet :   Rationale: increase ROM, increase strength, improve coordination, improve balance and increase proprioception to improve the patients ability to ambulate and perform ADLs with less pain or difficulty    35 min Neuromuscular Re-education:  [x]  See flow sheet :   Rationale: increase ROM, increase strength, improve coordination, improve balance and increase proprioception  to improve the patients ability to ambulate and perform ADLs with less risk of falling    With   [x] TE   [] TA   [] Neuro   [] SC   [] other: Patient Education: [x] Review HEP    [] Progressed/Changed HEP based on:   [] positioning   [] body mechanics   [] transfers   [] heat/ice application    [] other:      Other Objective/Functional Measures: None noted     Pain Level (0-10 scale) post treatment: 0/10    ASSESSMENT/Changes in Function:   The Pt required less external support to prevent LOB with her balance exercises. She was able to turn her head quickly without LOB on a compliant surface.   She is slowly gaining more confidence with her balance and learning to trust herself more in normal situations. Patient will continue to benefit from skilled PT services to modify and progress therapeutic interventions, address functional mobility deficits, address ROM deficits, address strength deficits, analyze and address soft tissue restrictions, analyze and cue movement patterns, analyze and modify body mechanics/ergonomics, assess and modify postural abnormalities and instruct in home and community integration to attain remaining goals. []  See Plan of Care  []  See progress note/recertification  []  See Discharge Summary         Progress towards goals / Updated goals:  Short Term Goals: To be accomplished in 6 treatments:  1. The Pt will be independent and compliant with their HEP.  Met   2. The Pt will report a 50% reduction in their pain with ADLs.  Met   Long Term Goals: To be accomplished in 16 treatments:  1. The Pt will score the MCII on her FOTO survey demonstrating improved overall function (16 to 39 points). Met   2. The Pt will score >/= 110/120 on the MCTSIB demonstrating improved balance and neuromuscular control.  Progressing towards  3.  The Pt will report a 50% reduction in falling. Met     PLAN  [x]  Upgrade activities as tolerated     [x]  Continue plan of care  [x]  Update interventions per flow sheet       []  Discharge due to:_  []  Other:_      Amy Poole, PT 2/21/2022

## 2022-02-24 ENCOUNTER — HOSPITAL ENCOUNTER (OUTPATIENT)
Dept: PHYSICAL THERAPY | Age: 37
Discharge: HOME | End: 2022-02-24
Payer: MEDICAID

## 2022-02-24 PROCEDURE — 97112 NEUROMUSCULAR REEDUCATION: CPT

## 2022-02-24 PROCEDURE — 97110 THERAPEUTIC EXERCISES: CPT

## 2022-02-24 NOTE — PROGRESS NOTES
PT DAILY TREATMENT NOTE 2-15    Patient Name: Laura Saxena  Date:2022  : 1985  [x]  Patient  Verified  Payor: Erik Kanner / Plan: Zach Henry / Product Type: Managed Care Medicaid /    In time:  3:30 PM  Out time:  4:30 PM  Total Treatment Time (min): 60 minutes  Visit #:  12    Treatment Area: Neuropathy [G62.9]    SUBJECTIVE  Pain Level (0-10 scale): 0/10  Any medication changes, allergies to medications, adverse drug reactions, diagnosis change, or new procedure performed?: [x] No    [] Yes (see summary sheet for update)  Subjective functional status/changes:   [] No changes reported  The Pt reports that she is feeling good and denies any significant soreness after last session. She is feeling more confident with her balance. OBJECTIVE    20 min Therapeutic Exercise:  [x] See flow sheet :   Rationale: increase ROM, increase strength, improve coordination, improve balance and increase proprioception to improve the patients ability to ambulate and perform ADLs with less difficulty    40 min Neuromuscular Re-education:  [x]  See flow sheet :   Rationale: increase ROM, increase strength, improve coordination, improve balance and increase proprioception  to improve the patients ability to ambulate and perform ADLs with less risk of falling    With   [x] TE   [] TA   [] Neuro   [] SC   [] other: Patient Education: [x] Review HEP    [] Progressed/Changed HEP based on:   [] positioning   [] body mechanics   [] transfers   [] heat/ice application    [] other:      Other Objective/Functional Measures: None noted     Pain Level (0-10 scale) post treatment: 0/10    ASSESSMENT/Changes in Function:   The Pt tolerated her therapy program well. Added in more compliant surface training and eyes closed training. She tolerated this nicely; it was challenging, but she was able to perform the exercise well. She is progressing nicely with her balance and neuromuscular control.   Patient will continue to benefit from skilled PT services to modify and progress therapeutic interventions, address functional mobility deficits, address ROM deficits, address strength deficits, analyze and address soft tissue restrictions, analyze and cue movement patterns, analyze and modify body mechanics/ergonomics, assess and modify postural abnormalities and instruct in home and community integration to attain remaining goals. []  See Plan of Care  []  See progress note/recertification  []  See Discharge Summary         Progress towards goals / Updated goals:  Short Term Goals: To be accomplished in 6 treatments:  1. The Pt will be independent and compliant with their HEP.  Met   2. The Pt will report a 50% reduction in their pain with ADLs.  Met   Long Term Goals: To be accomplished in 16 treatments:  1. The Pt will score the MCII on her FOTO survey demonstrating improved overall function (16 to 39 points). Met   2. The Pt will score >/= 110/120 on the MCTSIB demonstrating improved balance and neuromuscular control.  Progressing towards  3.  The Pt will report a 50% reduction in falling. Met     PLAN  [x]  Upgrade activities as tolerated     [x]  Continue plan of care  [x]  Update interventions per flow sheet       []  Discharge due to:_  []  Other:_      Aysha Genao, PT 2/24/2022

## 2022-03-03 ENCOUNTER — HOSPITAL ENCOUNTER (OUTPATIENT)
Dept: PHYSICAL THERAPY | Age: 37
Discharge: HOME | End: 2022-03-03
Payer: MEDICAID

## 2022-03-03 PROCEDURE — 97110 THERAPEUTIC EXERCISES: CPT

## 2022-03-03 PROCEDURE — 97112 NEUROMUSCULAR REEDUCATION: CPT

## 2022-03-03 NOTE — PROGRESS NOTES
PT DAILY TREATMENT NOTE 2-15    Patient Name: Peg Parsons  Date:3/3/2022  : 1985  [x]  Patient  Verified  Payor: Ana Lilia Saleem / Plan: Ernst Barakat / Product Type: Managed Care Medicaid /    In time:  3:02 PM  Out time:  4:00 PM  Total Treatment Time (min): 58 minutes  Visit #:  13    Treatment Area: Neuropathy [G62.9]    SUBJECTIVE  Pain Level (0-10 scale): 0/10  Any medication changes, allergies to medications, adverse drug reactions, diagnosis change, or new procedure performed?: [x] No    [] Yes (see summary sheet for update)  Subjective functional status/changes:   [] No changes reported  The Pt reports that she is feeling more confident. She worked on some dancing over the weekend and was very surprised that she was able to do it as well as she did. OBJECTIVE    18 min Therapeutic Exercise:  [x] See flow sheet :   Rationale: increase ROM, increase strength, improve coordination, improve balance and increase proprioception to improve the patients ability to ambulate and perform ADLs with less difficulty    40 min Neuromuscular Re-education:  [x]  See flow sheet :   Rationale: increase ROM, increase strength, improve coordination, improve balance and increase proprioception  to improve the patients ability to ambulate and perform ADLs with less risk of falling    With   [x] TE   [] TA   [] Neuro   [] SC   [] other: Patient Education: [x] Review HEP    [] Progressed/Changed HEP based on:   [] positioning   [] body mechanics   [] transfers   [] heat/ice application    [] other:      Other Objective/Functional Measures: None noted     Pain Level (0-10 scale) post treatment: 0/10    ASSESSMENT/Changes in Function:   The Pt did very well with her balance exercises. She is still fearful and psyches herself out of being able to perform exercises without any external support; she is able to do it, but it is very challenging and she does not trust herself enough.   Will continue to progress as tolerated during next PT session. Patient will continue to benefit from skilled PT services to modify and progress therapeutic interventions, address functional mobility deficits, address ROM deficits, address strength deficits, analyze and address soft tissue restrictions, analyze and cue movement patterns, analyze and modify body mechanics/ergonomics, assess and modify postural abnormalities and instruct in home and community integration to attain remaining goals. []  See Plan of Care  []  See progress note/recertification  []  See Discharge Summary         Progress towards goals / Updated goals:  Short Term Goals: To be accomplished in 6 treatments:  1. The Pt will be independent and compliant with their HEP.  Met   2. The Pt will report a 50% reduction in their pain with ADLs.  Met   Long Term Goals: To be accomplished in 16 treatments:  1. The Pt will score the MCII on her FOTO survey demonstrating improved overall function (16 to 39 points). Met   2. The Pt will score >/= 110/120 on the MCTSIB demonstrating improved balance and neuromuscular control.  Progressing towards  3.  The Pt will report a 50% reduction in falling. Met     PLAN  [x]  Upgrade activities as tolerated     [x]  Continue plan of care  [x]  Update interventions per flow sheet       []  Discharge due to:_  []  Other:_      Maliha Reid PT 3/3/2022

## 2022-03-10 ENCOUNTER — HOSPITAL ENCOUNTER (OUTPATIENT)
Dept: PHYSICAL THERAPY | Age: 37
Discharge: HOME | End: 2022-03-10
Payer: MEDICAID

## 2022-03-10 PROCEDURE — 97112 NEUROMUSCULAR REEDUCATION: CPT

## 2022-03-10 PROCEDURE — 97110 THERAPEUTIC EXERCISES: CPT

## 2022-03-10 NOTE — PROGRESS NOTES
PT DAILY TREATMENT NOTE 2-15    Patient Name: Farhat Ball  Date:3/10/2022  : 1985  [x]  Patient  Verified  Payor: Yadiel Damon / Plan: Allie Mcgregor / Product Type: Managed Care Medicaid /    In time:  3:30 PM  Out time:  4:25 PM  Total Treatment Time (min): 55 minutes  Visit #:  14    Treatment Area: Neuropathy [G62.9]    SUBJECTIVE  Pain Level (0-10 scale): 0/10  Any medication changes, allergies to medications, adverse drug reactions, diagnosis change, or new procedure performed?: [x] No    [] Yes (see summary sheet for update)  Subjective functional status/changes:   [] No changes reported  The Pt reports that she was very sore after last session, but it was good because she had a good work out. She is feeling more comfortable and confident. OBJECTIVE    25 min Therapeutic Exercise:  [x] See flow sheet :   Rationale: increase ROM, increase strength, improve coordination, improve balance and increase proprioception to improve the patients ability to ambulate and perform ADLs with less difficulty    30 min Neuromuscular Re-education:  [x]  See flow sheet :   Rationale: increase ROM, increase strength, improve coordination, improve balance and increase proprioception  to improve the patients ability to ambulate and perform ADLs with less difficulty or risk of falling    With   [x] TE   [] TA   [] Neuro   [] SC   [] other: Patient Education: [x] Review HEP    [] Progressed/Changed HEP based on:   [] positioning   [] body mechanics   [] transfers   [] heat/ice application    [] other:      Other Objective/Functional Measures: None noted     Pain Level (0-10 scale) post treatment: 0/10    ASSESSMENT/Changes in Function:   The Pt is requiring less use of an external support to prevent LOB. Her LE strength is progressing nicely and she tolerated the increased resistance bands well with appropriate muscle fatigue noted.   Patient will continue to benefit from skilled PT services to modify and progress therapeutic interventions, address functional mobility deficits, address ROM deficits, address strength deficits, analyze and address soft tissue restrictions, analyze and cue movement patterns, analyze and modify body mechanics/ergonomics, assess and modify postural abnormalities and instruct in home and community integration to attain remaining goals. []  See Plan of Care  []  See progress note/recertification  []  See Discharge Summary         Progress towards goals / Updated goals:  Short Term Goals: To be accomplished in 6 treatments:  1. The Pt will be independent and compliant with their HEP.  Met   2. The Pt will report a 50% reduction in their pain with ADLs.  Met   Long Term Goals: To be accomplished in 16 treatments:  1. The Pt will score the MCII on her FOTO survey demonstrating improved overall function (16 to 39 points). Met   2. The Pt will score >/= 110/120 on the MCTSIB demonstrating improved balance and neuromuscular control.  Progressing towards  3.  The Pt will report a 50% reduction in falling. Met     PLAN  [x]  Upgrade activities as tolerated     [x]  Continue plan of care  [x]  Update interventions per flow sheet       []  Discharge due to:_  []  Other:_      Olga Cameron, PT 3/10/2022

## 2022-03-17 ENCOUNTER — APPOINTMENT (OUTPATIENT)
Dept: PHYSICAL THERAPY | Age: 37
End: 2022-03-17
Payer: MEDICAID

## 2022-03-24 ENCOUNTER — HOSPITAL ENCOUNTER (OUTPATIENT)
Dept: PHYSICAL THERAPY | Age: 37
Discharge: HOME | End: 2022-03-24
Payer: MEDICAID

## 2022-03-24 PROCEDURE — 97112 NEUROMUSCULAR REEDUCATION: CPT

## 2022-03-24 PROCEDURE — 97110 THERAPEUTIC EXERCISES: CPT

## 2022-03-24 NOTE — PROGRESS NOTES
PT DAILY TREATMENT NOTE 2-15    Patient Name: Zackary Dodge  Date:3/24/2022  : 1985  [x]  Patient  Verified  Payor: Dmitriy Solis / Plan: [id number] / Product Type: Managed Care Medicaid /    In time:  3:35 PM  Out time:  4:30 PM  Total Treatment Time (min): 55 minutes  Visit #:  15    Treatment Area: Neuropathy [G62.9]    SUBJECTIVE  Pain Level (0-10 scale): 0/10  Any medication changes, allergies to medications, adverse drug reactions, diagnosis change, or new procedure performed?: [x] No    [] Yes (see summary sheet for update)  Subjective functional status/changes:   [] No changes reported  The Pt reports that she was unable to come last week because she \"threw her back out\". She reports that she is feeling much better with her walking and no longer has to walk with a SPC. She has been able to get out and dance with friends. She has had some minor falls that she has been able to catch herself- she slipped on a wet surface and then she was stepping up some stairs to step up on a deck and R knee gave out and she felt onto the R knee and was able to catch herself. Prior to this, she has not had any falls since OPAL. Overall, she believes that she is 75-80% improved. The major problems are standing in one place for a long period and then walking on her feet for longer periods.      OBJECTIVE    30 min Therapeutic Exercise:  [x] See flow sheet :   Rationale: increase ROM, increase strength, improve coordination, improve balance and increase proprioception to improve the patients ability to ambulate and perform ADLs with less difficulty or risk of falling    25 min Neuromuscular Re-education:  [x]  See flow sheet :   Rationale: increase ROM, increase strength, improve coordination, improve balance and increase proprioception  to improve the patients ability to ambulate and perform ADLs with less difficulty or risk of falling          With   [x] TE   [] TA   [] Neuro   [] SC   [] other: Patient Education: [x] Review HEP    [] Progressed/Changed HEP based on:   [] positioning   [] body mechanics   [] transfers   [] heat/ice application    [] other:      Other Objective/Functional Measures:  FOTO 50/100 (16/100 at initial evaluation)  MTSIB: trial 1- 30s, trial 2- 30s, trial 3- 30s, trial 4- 3s (99/120)     Pain Level (0-10 scale) post treatment: 0/10    ASSESSMENT/Changes in Function:   Patient will continue to benefit from skilled PT services to modify and progress therapeutic interventions, address functional mobility deficits, address ROM deficits, address strength deficits, analyze and address soft tissue restrictions, analyze and cue movement patterns, analyze and modify body mechanics/ergonomics, assess and modify postural abnormalities and instruct in home and community integration to attain remaining goals. []  See Plan of Care  [x]  See progress note/recertification  []  See Discharge Summary         Progress towards goals / Updated goals:  Short Term Goals: To be accomplished in 6 treatments:  1. The Pt will be independent and compliant with their HEP.  Met   2. The Pt will report a 50% reduction in their pain with ADLs.  Met   Long Term Goals: To be accomplished in 16 treatments:  1. The Pt will score the MCII on her FOTO survey demonstrating improved overall function (16 to 39 points). Met   2. The Pt will score >/= 110/120 on the MCTSIB demonstrating improved balance and neuromuscular control.  Progressing towards  3. The Pt will report a 50% reduction in falling. Met   4. The Pt will be able to walk for 20 minutes consecutively without any assistive device to allow the Pt to be able to walk to a concert with less difficulty. 5. 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     PLAN  [x]  Upgrade activities as tolerated     [x]  Continue plan of care  [x] Update interventions per flow sheet       []  Discharge due to:_  []  Other:_      Joe Maxwell, PT 3/24/2022

## 2022-03-31 ENCOUNTER — HOSPITAL ENCOUNTER (OUTPATIENT)
Dept: PHYSICAL THERAPY | Age: 37
Discharge: HOME | End: 2022-03-31
Payer: MEDICAID

## 2022-03-31 PROCEDURE — 97112 NEUROMUSCULAR REEDUCATION: CPT

## 2022-03-31 PROCEDURE — 97110 THERAPEUTIC EXERCISES: CPT

## 2022-03-31 NOTE — PROGRESS NOTES
PT DAILY TREATMENT NOTE 2-15    Patient Name: Ligia Davis  Date:3/31/2022  : 1985  [x]  Patient  Verified  Payor: Kristin Diaz / Plan: [id number] / Product Type: Managed Care Medicaid /    In time:  9:40 AM  Out time: 10:30 AM  Total Treatment Time (min):  50 minutes  Visit #:  16    Treatment Area: Neuropathy [G62.9]    SUBJECTIVE  Pain Level (0-10 scale): -6/10  Any medication changes, allergies to medications, adverse drug reactions, diagnosis change, or new procedure performed?: [x] No    [] Yes (see summary sheet for update)  Subjective functional status/changes:   [] No changes reported  Patient reports continues to have back pain since dancing incident ~ 2 weeks ago; reports she has also incr freq and intensity of exercises at home which is making her muscles more sore; states \"feel kind of shaky this morning\"     OBJECTIVE    30 min Therapeutic Exercise:  [x] See flow sheet :   Rationale: increase ROM, increase strength, improve coordination, improve balance and increase proprioception to improve the patients ability to ambulate and perform ADLs with less difficulty or risk of falling    20 min Neuromuscular Re-education:  [x]  See flow sheet :   Rationale: increase ROM, increase strength, improve coordination, improve balance and increase proprioception  to improve the patients ability to ambulate and perform ADLs with less difficulty or risk of falling          With   [x] TE   [] TA   [] Neuro   [] SC   [] other: Patient Education: [x] Review HEP    [] Progressed/Changed HEP based on:   [] positioning   [] body mechanics   [] transfers   [] heat/ice application    [] other:      Other Objective/Functional Measures:       Pain Level (0-10 scale) post treatment: 3-4/10    ASSESSMENT/Changes in Function:     Pt reports improved ease and performance w/ march on MT - attempted w/ EC which was challenging and required firm  w/ bilat UE; pt w/ report of min challenge w/ SLS clock exercise - modified technique to decr UE assist, cueing pt to decr LE movement and use toe tap PRN to continue to decrease use of UE assist for progression dynamic balance      Patient will continue to benefit from skilled PT services to modify and progress therapeutic interventions, address functional mobility deficits, address ROM deficits, address strength deficits, analyze and address soft tissue restrictions, analyze and cue movement patterns, analyze and modify body mechanics/ergonomics, assess and modify postural abnormalities and instruct in home and community integration to attain remaining goals. []  See Plan of Care  [x]  See progress note/recertification  []  See Discharge Summary         Progress towards goals / Updated goals:  Short Term Goals: To be accomplished in 6 treatments:  1. The Pt will be independent and compliant with their HEP.  Met   2. The Pt will report a 50% reduction in their pain with ADLs.  Met   Long Term Goals: To be accomplished in 16 treatments:  1. The Pt will score the MCII on her FOTO survey demonstrating improved overall function (16 to 39 points). Met   2. The Pt will score >/= 110/120 on the MCTSIB demonstrating improved balance and neuromuscular control.  Progressing towards  3. The Pt will report a 50% reduction in falling. Met   4. The Pt will be able to walk for 20 minutes consecutively without any assistive device to allow the Pt to be able to walk to a concert with less difficulty. 5. 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     PLAN  [x]  Upgrade activities as tolerated     [x]  Continue plan of care  [x]  Update interventions per flow sheet       []  Discharge due to:_  []  Other:_      Elinore Lesches, PT 3/31/2022

## 2022-04-07 ENCOUNTER — HOSPITAL ENCOUNTER (OUTPATIENT)
Dept: PHYSICAL THERAPY | Age: 37
Discharge: HOME | End: 2022-04-07
Payer: MEDICAID

## 2022-04-07 PROCEDURE — 97112 NEUROMUSCULAR REEDUCATION: CPT

## 2022-04-07 PROCEDURE — 97110 THERAPEUTIC EXERCISES: CPT

## 2022-04-07 NOTE — PROGRESS NOTES
PT DAILY TREATMENT NOTE 2-15    Patient Name: Trevon Valentin  Date:2022  : 1985  [x]  Patient  Verified  Payor: Jamila Vital / Plan: Dima Rashid / Product Type: Managed Care Medicaid /    In time:  3:05 PM  Out time:  4:00 PM  Total Treatment Time (min): 55 minutes  Visit #:  17    Treatment Area: Neuropathy [G62.9]    SUBJECTIVE  Pain Level (0-10 scale): 0/10  Any medication changes, allergies to medications, adverse drug reactions, diagnosis change, or new procedure performed?: [x] No    [] Yes (see summary sheet for update)  Subjective functional status/changes:   [] No changes reported  The Pt reports that her back is feeling much better. She is walking much better, but continues to doubt her standing balance. OBJECTIVE    10 min Therapeutic Exercise:  [x] See flow sheet :   Rationale: increase ROM, increase strength, improve coordination, improve balance and increase proprioception to improve the patients ability to ambulate and perform ADLs with less difficulty or risk of falling    45 min Neuromuscular Re-education:  [x]  See flow sheet :   Rationale: increase ROM, increase strength, improve coordination, improve balance and increase proprioception  to improve the patients ability to ambulate and perform ADLs with less difficulty or risk of falling    With   [x] TE   [] TA   [] Neuro   [] SC   [] other: Patient Education: [x] Review HEP    [] Progressed/Changed HEP based on:   [] positioning   [] body mechanics   [] transfers   [] heat/ice application    [] other:      Other Objective/Functional Measures: None noted     Pain Level (0-10 scale) post treatment: 0/10    ASSESSMENT/Changes in Function:   The Pt performed very well with her exercises. Today, tried to focus on having her balance in one place while also performing dynamic tasks with her UEs (catching balls, throwing, and reaching).   She was able to perform resisted walking at the cable column well, but did have a few instances where she was unable to control her body against the force of the machine. She is progressing very nicely. Patient will continue to benefit from skilled PT services to modify and progress therapeutic interventions, address functional mobility deficits, address ROM deficits, address strength deficits, analyze and address soft tissue restrictions, analyze and cue movement patterns, analyze and modify body mechanics/ergonomics, assess and modify postural abnormalities and instruct in home and community integration to attain remaining goals. []  See Plan of Care  []  See progress note/recertification  []  See Discharge Summary         Progress towards goals / Updated goals:  Short Term Goals: To be accomplished in 6 treatments:  1. The Pt will be independent and compliant with their HEP.  Met   2. The Pt will report a 50% reduction in their pain with ADLs.  Met   Long Term Goals: To be accomplished in 16 treatments:  1. The Pt will score the MCII on her FOTO survey demonstrating improved overall function (16 to 39 points). Met   2. The Pt will score >/= 110/120 on the MCTSIB demonstrating improved balance and neuromuscular control.  Progressing towards  3. The Pt will report a 50% reduction in falling. Met   4. The Pt will be able to walk for 20 minutes consecutively without any assistive device to allow the Pt to be able to walk to a concert with less difficulty- progressing  5.  The Pt will be able to stand independently without any external support for >/= 5 minutes to allow the Pt to be able to assist customers at work without having to hold onto something- progressing    PLAN  [x]  Upgrade activities as tolerated     [x]  Continue plan of care  [x]  Update interventions per flow sheet       []  Discharge due to:_  []  Other:_      Memo García, PT 4/7/2022

## 2022-04-08 ENCOUNTER — OFFICE VISIT (OUTPATIENT)
Dept: INTERNAL MEDICINE CLINIC | Age: 37
End: 2022-04-08
Payer: MEDICAID

## 2022-04-08 VITALS
SYSTOLIC BLOOD PRESSURE: 151 MMHG | TEMPERATURE: 98.7 F | WEIGHT: 146.8 LBS | HEART RATE: 91 BPM | DIASTOLIC BLOOD PRESSURE: 110 MMHG | BODY MASS INDEX: 26.01 KG/M2 | HEIGHT: 63 IN | RESPIRATION RATE: 16 BRPM | OXYGEN SATURATION: 95 %

## 2022-04-08 DIAGNOSIS — D75.89 MACROCYTOSIS WITHOUT ANEMIA: ICD-10-CM

## 2022-04-08 DIAGNOSIS — R03.0 ELEVATED BLOOD-PRESSURE READING WITHOUT DIAGNOSIS OF HYPERTENSION: Primary | ICD-10-CM

## 2022-04-08 DIAGNOSIS — Z11.59 NEED FOR HEPATITIS C SCREENING TEST: ICD-10-CM

## 2022-04-08 DIAGNOSIS — G62.9 SENSORY NEUROPATHY: ICD-10-CM

## 2022-04-08 DIAGNOSIS — F10.20 UNCOMPLICATED ALCOHOL DEPENDENCE (HCC): ICD-10-CM

## 2022-04-08 DIAGNOSIS — F51.04 CHRONIC INSOMNIA: ICD-10-CM

## 2022-04-08 DIAGNOSIS — Z13.220 ENCOUNTER FOR SCREENING FOR LIPID DISORDER: ICD-10-CM

## 2022-04-08 PROCEDURE — 99214 OFFICE O/P EST MOD 30 MIN: CPT | Performed by: INTERNAL MEDICINE

## 2022-04-08 RX ORDER — PREGABALIN 75 MG/1
75 CAPSULE ORAL 2 TIMES DAILY
Qty: 60 CAPSULE | Refills: 1 | Status: SHIPPED | OUTPATIENT
Start: 2022-04-08 | End: 2022-11-01

## 2022-04-08 NOTE — PROGRESS NOTES
CC:   Chief Complaint   Patient presents with    Physical     Room 3       HISTORY OF PRESENT ILLNESS  Carlos Ca is a 40 y.o. female. Presents for physical exam and 6 month follow up evaluation. She has sensory neuropathy, depression, anxiety, chronic insomnia, tobacco use, and alcohol dependence.     No new complaints. Unchanged numbness and tingling affecting her fingers and toes. Take Lyrica 75 mg twice daily (sometimes forgets to take second dose). Uses a cane to walk long distances, uses it regularly at work. Undergoing PT for neuropathy since ; feels it has been helping significantly. Doing exercises for balance, walking, and leg strengthening. Has appointment with Dr. Arcadio Ordoñez in May. Plans to discuss filing for disability and getting a handicap parking placard. Has Mirena IUD in place that  in . At last clinic visit, she was instructed to return to the health department to have it removed and have a Pap smear. Forgot to do this.     Denies depressed mood and anxiety. Sleeping well with trazodone 100 mg nightly.     Soc Hx  Single. No children. 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 Smokes 1 ppd since age 15. Drinks 1.5-2 glasses of Vodka every 2-3 days (decrease from daily at last clinic visit). Denies recreational drug use. No formal exercise but stays active. ROS  A complete review of systems was performed and is negative except for those mentioned in the HPI.        Patient Active Problem List   Diagnosis Code    Hypomagnesemia E83.42    Folate deficiency E53.8    Ingrown nail L60.0    Macrocytosis without anemia D75.89    Hypokalemia E87.6    Alcohol abuse F10.10     Past Medical History:   Diagnosis Date    Bartholin cyst     Infectious disease     MRSA in left and right axillary     No Known Allergies    Current Outpatient Medications   Medication Sig Dispense Refill    pregabalin (LYRICA) 75 mg capsule Take 1 Capsule by mouth two (2) times a day. Max Daily Amount: 150 mg. 60 Capsule 1    ibuprofen (MOTRIN) 800 mg tablet Take 1 Tablet by mouth every eight (8) hours as needed for Pain. 40 Tablet 0    traZODone (DESYREL) 100 mg tablet TAKE 1 TABLET BY MOUTH EVERY NIGHT 90 Tablet 1    levonorgestrel (MIRENA) 20 mcg/24 hr (5 years) IUD 1 Each by IntraUTERine route once. PHYSICAL EXAM  Visit Vitals  BP (!) 151/110 (BP 1 Location: Left upper arm, BP Patient Position: Sitting)   Pulse 91   Temp 98.7 °F (37.1 °C) (Oral)   Resp 16   Ht 5' 3\" (1.6 m)   Wt 146 lb 12.8 oz (66.6 kg)   SpO2 95%   BMI 26.00 kg/m²       General: Well-developed and well-nourished, no distress. Ambulating with a cane. HEENT:  Head normocephalic/atraumatic, no scleral icterus  Lungs:  Clear to ausculation bilaterally. Good air movement. Heart:  Regular rate and rhythm, normal S1 and S2, no murmur, gallop, or rub  Extremities: No clubbing, cyanosis, or edema. Weak pedal pulses. Neurological: Alert and oriented. Psychiatric: Normal mood and affect. Behavior is normal.         ASSESSMENT AND PLAN    ICD-10-CM ICD-9-CM    1. Elevated blood-pressure reading without diagnosis of hypertension  S35.9 318.4 METABOLIC PANEL, COMPREHENSIVE      CBC WITH AUTOMATED DIFF      METABOLIC PANEL, COMPREHENSIVE      CBC WITH AUTOMATED DIFF   2. Sensory neuropathy  G62.9 356.9 pregabalin (LYRICA) 75 mg capsule   3. Macrocytosis without anemia  D75.89 289.89    4. Uncomplicated alcohol dependence ([facility] [address]  F10.20 303.90    5. Chronic insomnia  F51.04 780.52    6. Need for hepatitis C screening test  Z11.59 V73.89 HEPATITIS C AB      HEPATITIS C AB   7. Encounter for screening for lipid disorder  Z13.220 V77.91 LIPID PANEL      LIPID PANEL     Diagnoses and all orders for this visit:    1. Elevated blood-pressure reading without diagnosis of hypertension  Previously well-controlled. May be high due to smoking and eating a high-salt lunch before clinic visit. Will recheck in 1 month.   - METABOLIC PANEL, COMPREHENSIVE; Future  -     CBC WITH AUTOMATED DIFF; Future    2. Sensory neuropathy  -     Refill pregabalin (LYRICA) 75 mg capsule; Take 1 Capsule by mouth two (2) times a day. Max Daily Amount: 150 mg.    3. Macrocytosis without anemia    4. Uncomplicated alcohol dependence ([address]  Counseled on reducing alcohol intake. 5. Chronic insomnia  Controlled on trazodone. 6. Need for hepatitis C screening test  -     HEPATITIS C AB; Future    7. Encounter for screening for lipid disorder  -     LIPID PANEL; Future      Follow-up and Dispositions    · Return in about 1 month (around 5/8/2022), or if symptoms worsen or fail to improve, for Elevated BP; have fasting labs within next 4 weeks. I have discussed the diagnosis with the patient and the intended plan as seen in the above orders. Patient is in agreement. The patient has received an after-visit summary and questions were answered concerning future plans. I have discussed medication side effects and warnings with the patient as well.

## 2022-04-08 NOTE — PROGRESS NOTES
Toyin Dolan  Identified pt with two pt identifiers(name and ). Chief Complaint   Patient presents with    Physical     Room 3       Reviewed record In preparation for visit and have obtained necessary documentation. 1. Have you been to the ER, urgent care clinic or hospitalized since your last visit? No     2. Have you seen or consulted any other health care providers outside of the [address] [place] [address] since your last visit? Include any pap smears or colon screening. No    Patient does not have an advance directive. Vitals reviewed with provider.     Health Maintenance reviewed:     Health Maintenance Due   Topic    Hepatitis C Screening     Cervical cancer screen         Wt Readings from Last 3 Encounters:   22 146 lb 12.8 oz (66.6 kg)   21 135 lb 12.8 oz (61.6 kg)   10/08/21 129 lb (58.5 kg)      Temp Readings from Last 3 Encounters:   21 98.2 °F (36.8 °C) (Oral)   10/08/21 98.9 °F (37.2 °C) (Oral)   08/10/21 98.8 °F (37.1 °C)      BP Readings from Last 3 Encounters:   21 122/84   10/08/21 113/81   08/10/21 102/74      Pulse Readings from Last 3 Encounters:   21 (!) 109   10/08/21 (!) 113   08/10/21 (!) 112      Vitals:    22 1426   Resp: 16   Weight: 146 lb 12.8 oz (66.6 kg)   Height: 5' 3\" (1.6 m)   PainSc:   5   PainLoc: Leg          Learning Assessment:   :     Learning Assessment 2014   PRIMARY LEARNER Patient   HIGHEST LEVEL OF EDUCATION - PRIMARY LEARNER  GRADUATED HIGH SCHOOL OR GED   BARRIERS PRIMARY LEARNER NONE   CO-LEARNER CAREGIVER No   PRIMARY LANGUAGE ENGLISH    NEED No   LEARNER PREFERENCE PRIMARY DEMONSTRATION   ANSWERED BY patient   RELATIONSHIP SELF        Depression Screening:   :     3 most recent PHQ Screens 2021   Little interest or pleasure in doing things Not at all   Feeling down, depressed, irritable, or hopeless Not at all   Total Score PHQ 2 0        Fall Risk Assessment:   :     Fall Risk Assessment, last 12 mths 2/12/2021   Able to walk? Yes   Fall in past 12 months? 1   Do you feel unsteady? 1   Are you worried about falling 1   Is the gait abnormal? 1   Number of falls in past 12 months 2   Fall with injury? 0        Abuse Screening:   :     No flowsheet data found.      ADL Screening:   :     ADL Assessment 1/20/2017   Feeding yourself No Help Needed   Getting from bed to chair No Help Needed   Getting dressed No Help Needed   Bathing or showering No Help Needed   Walk across the room (includes cane/walker) No Help Needed   Using the telphone No Help Needed   Taking your medications No Help Needed   Preparing meals No Help Needed   Managing money (expenses/bills) No Help Needed   Moderately strenuous housework (laundry) No Help Needed   Shopping for personal items (toiletries/medicines) No Help Needed   Shopping for groceries No Help Needed   Driving No Help Needed   Climbing a flight of stairs No Help Needed   Getting to places beyond walking distances No Help Needed

## 2022-04-08 NOTE — PATIENT INSTRUCTIONS
Elevated Blood Pressure: Care Instructions  Your Care Instructions    Blood pressure is a measure of how hard the blood pushes against the walls of your arteries. It's normal for blood pressure to go up and down throughout the day. But if it stays up over time, you have high blood pressure. Two numbers tell you your blood pressure. The first number is the systolic pressure. It shows how hard the blood pushes when your heart is pumping. The second number is the diastolic pressure. It shows how hard the blood pushes between heartbeats, when your heart is relaxed and filling with blood. An ideal blood pressure in adults is less than 120/80 (say \"120 over 80\"). High blood pressure is 140/90 or higher. You have high blood pressure if your top number is 140 or higher or your bottom number is 90 or higher, or both. The main test for high blood pressure is simple, fast, and painless. To diagnose high blood pressure, your doctor will test your blood pressure at different times. After testing your blood pressure, your doctor may ask you to test it again when you are home. If you are diagnosed with high blood pressure, you can work with your doctor to make a long-term plan to manage it. Follow-up care is a key part of your treatment and safety. Be sure to make and go to all appointments, and call your doctor if you are having problems. It's also a good idea to know your test results and keep a list of the medicines you take. How can you care for yourself at home? · Do not smoke. Smoking increases your risk for heart attack and stroke. If you need help quitting, talk to your doctor about stop-smoking programs and medicines. These can increase your chances of quitting for good. · Stay at a healthy weight. · Try to limit how much sodium you eat to less than 2,300 milligrams (mg) a day. Your doctor may ask you to try to eat less than 1,500 mg a day. · Be physically active.  Get at least 30 minutes of exercise on most days of the week. Walking is a good choice. You also may want to do other activities, such as running, swimming, cycling, or playing tennis or team sports. · Avoid or limit alcohol. Talk to your doctor about whether you can drink any alcohol. · Eat plenty of fruits, vegetables, and low-fat dairy products. Eat less saturated and total fats. · Learn how to check your blood pressure at home. When should you call for help? Call your doctor now or seek immediate medical care if:  ? · Your blood pressure is much higher than normal (such as 180/110 or higher). ? · You think high blood pressure is causing symptoms such as:  ¨ Severe headache. ¨ Blurry vision. ? Watch closely for changes in your health, and be sure to contact your doctor if:  ? · You do not get better as expected. Where can you learn more? Go to http://www.gray.com/. Enter Q966 in the search box to learn more about \"Elevated Blood Pressure: Care Instructions. \"  Current as of: September 21, 2016  Content Version: 11.4  © 6309-2455 [employer]. Care instructions adapted under license by [employer] (which disclaims liability or warranty for this information). If you have questions about a medical condition or this instruction, always ask your healthcare professional. [address] any warranty or liability for your use of this information.

## 2022-04-14 ENCOUNTER — HOSPITAL ENCOUNTER (OUTPATIENT)
Dept: PHYSICAL THERAPY | Age: 37
Discharge: HOME | End: 2022-04-14
Payer: MEDICAID

## 2022-04-14 PROCEDURE — 97112 NEUROMUSCULAR REEDUCATION: CPT

## 2022-04-14 PROCEDURE — 97110 THERAPEUTIC EXERCISES: CPT

## 2022-04-14 NOTE — PROGRESS NOTES
PT DAILY TREATMENT NOTE 2-15    Patient Name: Reji Mayberry  Date:2022  : 1985  [x]  Patient  Verified  Payor: Rachael Mckeon / Plan: Ines Kelley / Product Type: Managed Care Medicaid /    In time:  3:37 PM  Out time:  4:35 PM  Total Treatment Time (min): 58 minutes  Visit #:  18    Treatment Area: Neuropathy [G62.9]    SUBJECTIVE  Pain Level (0-10 scale): 0/10  Any medication changes, allergies to medications, adverse drug reactions, diagnosis change, or new procedure performed?: [x] No    [] Yes (see summary sheet for update)  Subjective functional status/changes:   [] No changes reported  She denies any falls since her last session. She still feels like her balance is off, but not as severely. OBJECTIVE    10 min Therapeutic Exercise:  [x] See flow sheet :   Rationale: increase ROM, increase strength, improve coordination, improve balance and increase proprioception to improve the patients ability to ambulate and perform ADLs with less difficulty    48 min Neuromuscular Re-education:  [x]  See flow sheet :   Rationale: increase ROM, increase strength, improve coordination, improve balance and increase proprioception  to improve the patients ability to ambulate and perform ADLs with less difficulty or risk of falling          With   [x] TE   [] TA   [] Neuro   [] SC   [] other: Patient Education: [x] Review HEP    [] Progressed/Changed HEP based on:   [] positioning   [] body mechanics   [] transfers   [] heat/ice application    [] other:      Other Objective/Functional Measures: None noted     Pain Level (0-10 scale) post treatment: 0/10    ASSESSMENT/Changes in Function:   The Pt continues to progress well with her balance exercises. She is able to balance without external support with distracting variable wells. Will continue to progress as tolerated during next PT session.   Patient will continue to benefit from skilled PT services to modify and progress therapeutic interventions, address functional mobility deficits, address ROM deficits, address strength deficits, analyze and address soft tissue restrictions, analyze and cue movement patterns, analyze and modify body mechanics/ergonomics, assess and modify postural abnormalities and instruct in home and community integration to attain remaining goals. []  See Plan of Care  []  See progress note/recertification  []  See Discharge Summary         Progress towards goals / Updated goals:  Short Term Goals: To be accomplished in 6 treatments:  1. The Pt will be independent and compliant with their HEP.  Met   2. The Pt will report a 50% reduction in their pain with ADLs.  Met   Long Term Goals: To be accomplished in 16 treatments:  1. The Pt will score the MCII on her FOTO survey demonstrating improved overall function (16 to 39 points). Met   2. The Pt will score >/= 110/120 on the MCTSIB demonstrating improved balance and neuromuscular control.  Progressing towards  3. The Pt will report a 50% reduction in falling. Met   4. The Pt will be able to walk for 20 minutes consecutively without any assistive device to allow the Pt to be able to walk to a concert with less difficulty- progressing  5.  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

## 2022-04-21 ENCOUNTER — HOSPITAL ENCOUNTER (OUTPATIENT)
Dept: PHYSICAL THERAPY | Age: 37
Discharge: HOME | End: 2022-04-21
Payer: MEDICAID

## 2022-04-21 PROCEDURE — 97110 THERAPEUTIC EXERCISES: CPT

## 2022-04-21 PROCEDURE — 97112 NEUROMUSCULAR REEDUCATION: CPT

## 2022-04-21 NOTE — PROGRESS NOTES
PT DAILY TREATMENT NOTE 2-15    Patient Name: Kiki Wilkins  Date:2022  : 1985  [x]  Patient  Verified  Payor: César Martinez / Plan: Jeff Kelley / Product Type: Managed Care Medicaid /    In time:  400 PM  Out time:  447 PM  Total Treatment Time (min): 47 minutes  Visit #:  19    Treatment Area: Neuropathy [G62.9]    SUBJECTIVE  Pain Level (0-10 scale): 0/10  Any medication changes, allergies to medications, adverse drug reactions, diagnosis change, or new procedure performed?: [x] No    [] Yes (see summary sheet for update)  Subjective functional status/changes:   [] No changes reported  Patient reports she feels like her walking has gotten better, but does struggle with stopping. OBJECTIVE    10 min Therapeutic Exercise:  [x] See flow sheet :   Rationale: increase ROM, increase strength, improve coordination, improve balance and increase proprioception to improve the patients ability to ambulate and perform ADLs with less difficulty    37 min Neuromuscular Re-education:  [x]  See flow sheet :   Rationale: increase ROM, increase strength, improve coordination, improve balance and increase proprioception  to improve the patients ability to ambulate and perform ADLs with less difficulty or risk of falling          With   [x] TE   [] TA   [] Neuro   [] SC   [] other: Patient Education: [x] Review HEP    [] Progressed/Changed HEP based on:   [] positioning   [] body mechanics   [] transfers   [] heat/ice application    [] other:      Other Objective/Functional Measures: None noted     Pain Level (0-10 scale) post treatment: 0/10    ASSESSMENT/Changes in Function:   Worked on quick adjustments while ambulating with only slight LOB. Struggled maintaining tandem stance during ball toss. Tolerated all interventions well, will continue to progress as tolerated.    Patient will continue to benefit from skilled PT services to modify and progress therapeutic interventions, address functional mobility deficits, address ROM deficits, address strength deficits, analyze and address soft tissue restrictions, analyze and cue movement patterns, analyze and modify body mechanics/ergonomics, assess and modify postural abnormalities and instruct in home and community integration to attain remaining goals. []  See Plan of Care  []  See progress note/recertification  []  See Discharge Summary         Progress towards goals / Updated goals:  Short Term Goals: To be accomplished in 6 treatments:  1. The Pt will be independent and compliant with their HEP.  Met   2. The Pt will report a 50% reduction in their pain with ADLs.  Met   Long Term Goals: To be accomplished in 16 treatments:  1. The Pt will score the MCII on her FOTO survey demonstrating improved overall function (16 to 39 points). Met   2. The Pt will score >/= 110/120 on the MCTSIB demonstrating improved balance and neuromuscular control.  Progressing towards  3. The Pt will report a 50% reduction in falling. Met   4. The Pt will be able to walk for 20 minutes consecutively without any assistive device to allow the Pt to be able to walk to a concert with less difficulty- progressing  5.  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

## 2022-04-28 ENCOUNTER — APPOINTMENT (OUTPATIENT)
Dept: PHYSICAL THERAPY | Age: 37
End: 2022-04-28
Payer: MEDICAID

## 2022-04-28 ENCOUNTER — HOSPITAL ENCOUNTER (OUTPATIENT)
Dept: PHYSICAL THERAPY | Age: 37
Discharge: HOME | End: 2022-04-28
Payer: MEDICAID

## 2022-04-28 PROCEDURE — 97535 SELF CARE MNGMENT TRAINING: CPT

## 2022-04-28 PROCEDURE — 97110 THERAPEUTIC EXERCISES: CPT

## 2022-04-28 NOTE — PROGRESS NOTES
[facility]  [address] [place]  Phone: [phone] Fax: [phone]    Progress Note    Name: Vivien Howe   : 1985   MD: Latanya Guadalupe DO       Treatment Diagnosis: Neuropathy [G62.9]  Start of Care: 21    Visits from Start of Care: 20  Missed Visits: Cancelled 2, No show 1    Summary of Care: The Pt has been seen for 20 outpatient physical therapy sessions with gait and balance impairments secondary to neuropathy. The Pt's therapy program has focused on improving her LE strength and stability, improving her hip and core strength, improving her balance and neuromuscular control, correcting her gait impairments, and improving her activity tolerance and endurance via therapeutic exercises. The Pt continues to feel more confident with her walking, but has found more difficulty when she has to stop suddenly and there is nothing to hold onto. 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 She then went outside and stood for almost 5 minutes without anything to hold onto and no falling. She had significant pain after this encounter, but was able to do the task at hand. She is frustrated because she compares her current situation to how she was before developing neuropathy and has to be educated that she has still made significant improvements since developing neuropathy. She is progressing nicely with her higher level balance training and able to perform more functional and challenging exercises with less need for external support. Recommend continued for an additional 8 sessions to help progress her balance and neuromuscular control deficits. Thank you for this referral.     Progress towards goals / Updated goals:  Short Term Goals: To be accomplished in 6 treatments:  1.  The Pt will be independent and compliant with their HEP.  Met   2. The Pt will report a 50% reduction in their pain with ADLs.  Met   Long Term Goals: To be accomplished in 16 treatments:  1. The Pt will score the MCII on her FOTO survey demonstrating improved overall function (16 to 39 points). Met   2. The Pt will score >/= 110/120 on the MCTSIB demonstrating improved balance and neuromuscular control.  Progressing towards  3. The Pt will report a 50% reduction in falling. Met   4. The Pt will be able to walk for 20 minutes consecutively without any assistive device to allow the Pt to be able to walk to a concert with less difficulty- progressing  5.  The Pt will be able to stand independently without any external support for >/= 5 minutes to allow the Pt to be able to assist customers at work without having to hold onto something- progressing    Cindy Shah, PT 4/28/2022

## 2022-04-28 NOTE — PROGRESS NOTES
PT DAILY TREATMENT NOTE 2-15    Patient Name: Ernesto Melissa  Date:2022  : 1985  [x]  Patient  Verified  Payor: Ingrid White / Plan: [id number] / Product Type: Managed Care Medicaid /    In time:  3:34 PM  Out time:  4:25 PM  Total Treatment Time (min): 51 minutes  Visit #:  20    Treatment Area: Neuropathy [G62.9]    SUBJECTIVE  Pain Level (0-10 scale): 0/10  Any medication changes, allergies to medications, adverse drug reactions, diagnosis change, or new procedure performed?: [x] No    [] Yes (see summary sheet for update)  Subjective functional status/changes:   [] No changes reported  On Friday, 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   On Saturday, she went to the beach and stood up in the water at mid-shin     OBJECTIVE    14 min Therapeutic Exercise:  [x] See flow sheet :   Rationale: increase ROM, increase strength, improve coordination, improve balance and increase proprioception to improve the patients ability to ambulate and perform ADLs with less difficulty    37 min Self Care/Home Management:  [x]  See flow sheet :   Rationale: increase ROM, increase strength, improve coordination, improve balance and increase proprioception  to improve the patients ability to ambulate and perform ADLs with less difficulty    With   [x] TE   [] TA   [] Neuro   [] SC   [] other: Patient Education: [x] Review HEP    [] Progressed/Changed HEP based on:   [] positioning   [] body mechanics   [] transfers   [] heat/ice application    [] other:      Other Objective/Functional Measures:  FOTO 45/100 (16/100 at initial evaluation)     Pain Level (0-10 scale) post treatment: 0/10    ASSESSMENT/Changes in Function:   Patient will continue to benefit from skilled PT services to modify and progress therapeutic interventions, address functional mobility deficits, address ROM deficits, address strength deficits, analyze and address soft tissue restrictions, analyze and cue movement patterns, analyze and modify body mechanics/ergonomics, assess and modify postural abnormalities and instruct in home and community integration to attain remaining goals. []  See Plan of Care  [x]  See progress note/recertification  []  See Discharge Summary         Progress towards goals / Updated goals:  Short Term Goals: To be accomplished in 6 treatments:  1. The Pt will be independent and compliant with their HEP.  Met   2. The Pt will report a 50% reduction in their pain with ADLs.  Met   Long Term Goals: To be accomplished in 16 treatments:  1. The Pt will score the MCII on her FOTO survey demonstrating improved overall function (16 to 39 points). Met   2. The Pt will score >/= 110/120 on the MCTSIB demonstrating improved balance and neuromuscular control.  Progressing towards  3. The Pt will report a 50% reduction in falling. Met   4. The Pt will be able to walk for 20 minutes consecutively without any assistive device to allow the Pt to be able to walk to a concert with less difficulty- progressing  5.  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

## 2022-05-05 ENCOUNTER — HOSPITAL ENCOUNTER (OUTPATIENT)
Dept: PHYSICAL THERAPY | Age: 37
Discharge: HOME | End: 2022-05-05
Payer: MEDICAID

## 2022-05-05 PROCEDURE — 97112 NEUROMUSCULAR REEDUCATION: CPT

## 2022-05-05 NOTE — PROGRESS NOTES
PT DAILY TREATMENT NOTE 2-15    Patient Name: Yash Keith  Date:2022  : 1985  [x]  Patient  Verified  Payor: Tamika Hernandez / Plan: [id number] / Product Type: Managed Care Medicaid /    In time:  3:35 PM  Out time:  4:38 PM  Total Treatment Time (min): 63 minutes  Visit #:  21    Treatment Area: Neuropathy [G62.9]    SUBJECTIVE  Pain Level (0-10 scale): 0/10  Any medication changes, allergies to medications, adverse drug reactions, diagnosis change, or new procedure performed?: [x] No    [] Yes (see summary sheet for update)  Subjective functional status/changes:   [] No changes reported  The Pt denies any significant symptoms since her progress note. She has to hold on more when she looking overhead. She noticed increased swelling in the L ankle over the last 2 days and denies twisting the ankle or any aggravating events. OBJECTIVE    5 min Therapeutic Exercise:  [x] See flow sheet :   Rationale: increase ROM, increase strength, improve coordination, improve balance and increase proprioception to improve the patients ability to ambulate and perform ADLs with less difficulty    58 min Neuromuscular Re-education:  [x]  See flow sheet :   Rationale: increase ROM, increase strength, improve coordination, improve balance and increase proprioception  to improve the patients ability to ambulate and perform ADLs with less difficulty or risk of falling    With   [x] TE   [] TA   [] Neuro   [] SC   [] other: Patient Education: [x] Review HEP    [] Progressed/Changed HEP based on:   [] positioning   [] body mechanics   [] transfers   [] heat/ice application    [] other:      Other Objective/Functional Measures: BP: 143/109 L arm     Pain Level (0-10 scale) post treatment: 0/10    ASSESSMENT/Changes in Function:   The Pt is progressing very nicely with her balance exercises. Today, did ball toss making her catch overhead and she did well with this and did not lose her balance.  Added in additional core balance and she tolerated this nicely. Patient will continue to benefit from skilled PT services to modify and progress therapeutic interventions, address functional mobility deficits, address ROM deficits, address strength deficits, analyze and address soft tissue restrictions, analyze and cue movement patterns, analyze and modify body mechanics/ergonomics, assess and modify postural abnormalities and instruct in home and community integration to attain remaining goals. []  See Plan of Care  []  See progress note/recertification  []  See Discharge Summary         Progress towards goals / Updated goals:  Short Term Goals: To be accomplished in 6 treatments:  1. The Pt will be independent and compliant with their HEP.  Met   2. The Pt will report a 50% reduction in their pain with ADLs.  Met   Long Term Goals: To be accomplished in 16 treatments:  1. The Pt will score the MCII on her FOTO survey demonstrating improved overall function (16 to 39 points). Met   2. The Pt will score >/= 110/120 on the MCTSIB demonstrating improved balance and neuromuscular control.  Progressing towards  3. The Pt will report a 50% reduction in falling. Met   4. The Pt will be able to walk for 20 minutes consecutively without any assistive device to allow the Pt to be able to walk to a concert with less difficulty- progressing  5.  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

## 2022-05-12 ENCOUNTER — HOSPITAL ENCOUNTER (OUTPATIENT)
Dept: PHYSICAL THERAPY | Age: 37
Discharge: HOME | End: 2022-05-12
Payer: MEDICAID

## 2022-05-12 DIAGNOSIS — K04.7 DENTAL INFECTION: ICD-10-CM

## 2022-05-12 PROCEDURE — 97110 THERAPEUTIC EXERCISES: CPT

## 2022-05-12 PROCEDURE — 97112 NEUROMUSCULAR REEDUCATION: CPT

## 2022-05-12 RX ORDER — IBUPROFEN 800 MG/1
800 TABLET ORAL
Qty: 40 TABLET | Refills: 0 | Status: SHIPPED | OUTPATIENT
Start: 2022-05-12

## 2022-05-12 NOTE — PROGRESS NOTES
PT DAILY TREATMENT NOTE 2-15    Patient Name: Ernesto Melissa  Date:2022  : 1985  [x]  Patient  Verified  Payor: Ingrid White / Plan: [id number] / Product Type: Managed Care Medicaid /    In time: 333 PM  Out time:  430 PM  Total Treatment Time (min): 57 minutes  Visit #:  22    Treatment Area: Neuropathy [G62.9]    SUBJECTIVE  Pain Level (0-10 scale): 10/10  Any medication changes, allergies to medications, adverse drug reactions, diagnosis change, or new procedure performed?: [x] No    [] Yes (see summary sheet for update)  Subjective functional status/changes:   [] No changes reported  Patient reports she has been having a tooth ache today which has been a huge limiting factor because of the pain. She went on a walk in [employer] for around 30 minutes and she felt fine after. Tuesday night however pain began and lasted until mid day Wednesday. OBJECTIVE    15 min Therapeutic Exercise:  [x] See flow sheet :   Rationale: increase ROM, increase strength, improve coordination, improve balance and increase proprioception to improve the patients ability to ambulate and perform ADLs with less difficulty    42 min Neuromuscular Re-education:  [x]  See flow sheet :   Rationale: increase ROM, increase strength, improve coordination, improve balance and increase proprioception  to improve the patients ability to ambulate and perform ADLs with less difficulty or risk of falling    With   [x] TE   [] TA   [] Neuro   [] SC   [] other: Patient Education: [x] Review HEP    [] Progressed/Changed HEP based on:   [] positioning   [] body mechanics   [] transfers   [] heat/ice application    [] other:      Other Objective/Functional Measures: none noted     Pain Level (0-10 scale) post treatment: 0/10    ASSESSMENT/Changes in Function:   Struggled with coordinating even gait pattern with head turns. Fair control with ball tall and re bounder.  Tolerated all interventions well, will continue to progress as tolerated. Patient will continue to benefit from skilled PT services to modify and progress therapeutic interventions, address functional mobility deficits, address ROM deficits, address strength deficits, analyze and address soft tissue restrictions, analyze and cue movement patterns, analyze and modify body mechanics/ergonomics, assess and modify postural abnormalities and instruct in home and community integration to attain remaining goals. []  See Plan of Care  []  See progress note/recertification  []  See Discharge Summary         Progress towards goals / Updated goals:  Short Term Goals: To be accomplished in 6 treatments:  1. The Pt will be independent and compliant with their HEP.  Met   2. The Pt will report a 50% reduction in their pain with ADLs.  Met   Long Term Goals: To be accomplished in 16 treatments:  1. The Pt will score the MCII on her FOTO survey demonstrating improved overall function (16 to 39 points). Met   2. The Pt will score >/= 110/120 on the MCTSIB demonstrating improved balance and neuromuscular control.  Progressing towards  3. The Pt will report a 50% reduction in falling. Met   4. The Pt will be able to walk for 20 minutes consecutively without any assistive device to allow the Pt to be able to walk to a concert with less difficulty- progressing  5.  The Pt will be able to stand independently without any external support for >/= 5 minutes to allow the Pt to be able to assist customers at work without having to hold onto something- progressing    PLAN  [x]  Upgrade activities as tolerated     [x]  Continue plan of care  [x]  Update interventions per flow sheet       []  Discharge due to:_  []  Other:_      Valentina Galarza, PTA 5/12/2022

## 2022-05-12 NOTE — TELEPHONE ENCOUNTER
PCP: Edwin Cardona MD     Last appt: 5/9/2022   Future Appointments   Date Time Provider Henry Read   5/12/2022  3:30 PM Manisha Gloria PTA [facility]   5/17/2022  3:00 PM Keeley Bravo MD [facility]   5/19/2022  3:30 PM Imelda Brito PTA [facility]   5/26/2022  3:30 PM Imelda Brito PTA [facility]        Requested Prescriptions     Pending Prescriptions Disp Refills    ibuprofen (MOTRIN) 800 mg tablet 40 Tablet 0     Sig: Take 1 Tablet by mouth every eight (8) hours as needed for Pain.

## 2022-05-13 ENCOUNTER — VIRTUAL VISIT (OUTPATIENT)
Dept: INTERNAL MEDICINE CLINIC | Age: 37
End: 2022-05-13
Payer: MEDICAID

## 2022-05-13 DIAGNOSIS — K04.7 DENTAL INFECTION: Primary | ICD-10-CM

## 2022-05-13 PROCEDURE — 99441 PR PHYS/QHP TELEPHONE EVALUATION 5-10 MIN: CPT | Performed by: NURSE PRACTITIONER

## 2022-05-13 RX ORDER — AMOXICILLIN 500 MG/1
500 CAPSULE ORAL 3 TIMES DAILY
Qty: 21 CAPSULE | Refills: 0 | Status: SHIPPED | OUTPATIENT
Start: 2022-05-13 | End: 2022-05-20

## 2022-05-13 NOTE — PROGRESS NOTES
Toyin Dolan  Identified pt with two pt identifiers(name and ). Chief Complaint   Patient presents with    Dental Pain     pain - 9 (tooth)       Reviewed record In preparation for visit and have obtained necessary documentation. 1. Have you been to the ER, urgent care clinic or hospitalized since your last visit? No     2. Have you seen or consulted any other health care providers outside of the [address] [place] [address] since your last visit? Include any pap smears or colon screening. No    Patient does not have an advance directive. Vitals reviewed with provider. Health Maintenance reviewed:     Health Maintenance Due   Topic    Hepatitis C Screening     Cervical cancer screen           Wt Readings from Last 3 Encounters:   22 146 lb 12.8 oz (66.6 kg)   21 135 lb 12.8 oz (61.6 kg)   10/08/21 129 lb (58.5 kg)        Temp Readings from Last 3 Encounters:   22 98.7 °F (37.1 °C) (Oral)   21 98.2 °F (36.8 °C) (Oral)   10/08/21 98.9 °F (37.2 °C) (Oral)        BP Readings from Last 3 Encounters:   22 (!) 151/110   21 122/84   10/08/21 113/81        Pulse Readings from Last 3 Encounters:   22 91   21 (!) 109   10/08/21 (!) 113      There were no vitals filed for this visit. Learning Assessment:   :       Learning Assessment 2014   PRIMARY LEARNER Patient   HIGHEST LEVEL OF EDUCATION - PRIMARY LEARNER  GRADUATED HIGH SCHOOL OR GED   BARRIERS PRIMARY LEARNER NONE   CO-LEARNER CAREGIVER No   PRIMARY LANGUAGE ENGLISH    NEED No   LEARNER PREFERENCE PRIMARY DEMONSTRATION   ANSWERED BY patient   RELATIONSHIP SELF        Depression Screening:   :       3 most recent PHQ Screens 2022   Little interest or pleasure in doing things Not at all   Feeling down, depressed, irritable, or hopeless Not at all   Total Score PHQ 2 0        Fall Risk Assessment:   :       Fall Risk Assessment, last 12 mths 2021   Able to walk?  Yes   Fall in past 12 months? 1   Do you feel unsteady? 1   Are you worried about falling 1   Is the gait abnormal? 1   Number of falls in past 12 months 2   Fall with injury? 0        Abuse Screening:   :     No flowsheet data found.      ADL Screening:   :       ADL Assessment 1/20/2017   Feeding yourself No Help Needed   Getting from bed to chair No Help Needed   Getting dressed No Help Needed   Bathing or showering No Help Needed   Walk across the room (includes cane/walker) No Help Needed   Using the telphone No Help Needed   Taking your medications No Help Needed   Preparing meals No Help Needed   Managing money (expenses/bills) No Help Needed   Moderately strenuous housework (laundry) No Help Needed   Shopping for personal items (toiletries/medicines) No Help Needed   Shopping for groceries No Help Needed   Driving No Help Needed   Climbing a flight of stairs No Help Needed   Getting to places beyond walking distances No Help Needed

## 2022-05-13 NOTE — PROGRESS NOTES
Taryn Hough is a 40 y.o. female who was seen by synchronous (real-time) audio-video technology on 5/13/2022 for Dental Pain        Assessment & Plan:   {A/P PLUS DISPO QUZX:00183}    {time statement optional (Optional):83405}    Subjective:     Pt c/o dental pain. Needs to have wisdom teeth removed but waiting on funds. Prior to Admission medications    Medication Sig Start Date End Date Taking? Authorizing Provider   ibuprofen (MOTRIN) 800 mg tablet Take 1 Tablet by mouth every eight (8) hours as needed for Pain. 5/12/22   Lisseth Palumbo MD   pregabalin (LYRICA) 75 mg capsule Take 1 Capsule by mouth two (2) times a day. Max Daily Amount: 150 mg. 4/8/22   Lisseth Palumbo MD   traZODone (DESYREL) 100 mg tablet TAKE 1 TABLET BY MOUTH EVERY NIGHT 6/28/21   Lisseth Palumbo MD   levonorgestrel (MIRENA) 20 mcg/24 hr (5 years) IUD 1 Each by IntraUTERine route once. Provider, Historical     {History SmartLink choices - disappears if left unselected (Optional):39609}    ROS   see hpi  This visit was completed virtually using doxy. me     Objective:     Patient-Reported Vitals 12/30/2021   Patient-Reported Weight 125lb        [INSTRUCTIONS:  \"[x]\" Indicates a positive item  \"[]\" Indicates a negative item  -- DELETE ALL ITEMS NOT EXAMINED]    Constitutional: [x] Appears well-developed and well-nourished [x] No apparent distress      [] Abnormal -     Mental status: [x] Alert and awake  [x] Oriented to person/place/time [x] Able to follow commands    [] Abnormal -     Eyes:   EOM    [x]  Normal    [] Abnormal -   Sclera  [x]  Normal    [] Abnormal -          Discharge [x]  None visible   [] Abnormal -     HENT: [x] Normocephalic, atraumatic  [] Abnormal -   [x] Mouth/Throat: Mucous membranes are moist    External Ears [x] Normal  [] Abnormal -    Neck: [x] No visualized mass [] Abnormal -     Pulmonary/Chest: [x] Respiratory effort normal   [x] No visualized signs of difficulty breathing or respiratory distress [] Abnormal -      Musculoskeletal:   [x] Normal gait with no signs of ataxia         [x] Normal range of motion of neck        [] Abnormal -     Neurological:        [x] No Facial Asymmetry (Cranial nerve 7 motor function) (limited exam due to video visit)          [x] No gaze palsy        [] Abnormal -          Skin:        [x] No significant exanthematous lesions or discoloration noted on facial skin         [] Abnormal -            Psychiatric:       [x] Normal Affect [] Abnormal -        [x] No Hallucinations    Other pertinent observable physical exam findings:-        We discussed the expected course, resolution and complications of the diagnosis(es) in detail. Medication risks, benefits, costs, interactions, and alternatives were discussed as indicated. I advised her to contact the office if her condition worsens, changes or fails to improve as anticipated. She expressed understanding with the diagnosis(es) and plan. Taryn Hough, was evaluated through a synchronous (real-time) audio-video encounter. The patient (or guardian if applicable) is aware that this is a billable service, which includes applicable co-pays. Verbal consent to proceed has been obtained. The visit was conducted pursuant to the emergency declaration under the [facility], [address] [place] [address] waiver authority and the [employer] and [employer] General Act. Patient identification was verified, and a caregiver was present when appropriate. The patient was located at home in a state where the provider was licensed to provide care.       Maldonado Johnson NP

## 2022-05-13 NOTE — PROGRESS NOTES
Vineet Howell is a 40 y.o. female, evaluated via audio-only technology on 5/13/2022 for Dental Pain  . Assessment & Plan:   Diagnoses and all orders for this visit:    1. Dental infection  -     amoxicillin (AMOXIL) 500 mg capsule; Take 1 Capsule by mouth three (3) times daily for 7 days. 12  Subjective:     Pt c/o left bottom lower dental pain. Pain is 8-9/10. Has swelling around gums. Pain started yesterday AM.  Has been taking ibuprofen, 4 at a time, which helps with pain. Needs to have wisdom teeth removed but waiting on funds bc her insurance doesn't cover dental.  Has taken amox in the past for the pain which helps. Prior to Admission medications    Medication Sig Start Date End Date Taking? Authorizing Provider   ibuprofen (MOTRIN) 800 mg tablet Take 1 Tablet by mouth every eight (8) hours as needed for Pain. 5/12/22   Lisa Ayon MD   pregabalin (LYRICA) 75 mg capsule Take 1 Capsule by mouth two (2) times a day. Max Daily Amount: 150 mg. 4/8/22   Lisa Ayon MD   traZODone (DESYREL) 100 mg tablet TAKE 1 TABLET BY MOUTH EVERY NIGHT 6/28/21   Lisa Ayon MD   levonorgestrel (MIRENA) 20 mcg/24 hr (5 years) IUD 1 Each by IntraUTERine route once. Provider, Kya         ROS see hpi    No data recorded     Vineet Howell was evaluated through a patient-initiated, synchronous (real-time) audio only encounter. She (or guardian if applicable) is aware that it is a billable service, which includes applicable co-pays, with coverage as determined by her insurance carrier. This visit was conducted with the patient's (and/or Brianda Jones guardian's) verbal consent. She has not had a related appointment within my department in the past 7 days or scheduled within the next 24 hours. The patient was located in a state where the provider was licensed to provide care.     Total Time: minutes: 5-10 minutes    Vickie Ruvalcaba NP

## 2022-05-17 ENCOUNTER — VIRTUAL VISIT (OUTPATIENT)
Dept: NEUROLOGY | Age: 37
End: 2022-05-17

## 2022-05-19 ENCOUNTER — APPOINTMENT (OUTPATIENT)
Dept: PHYSICAL THERAPY | Age: 37
End: 2022-05-19
Payer: MEDICAID

## 2022-05-26 ENCOUNTER — APPOINTMENT (OUTPATIENT)
Dept: PHYSICAL THERAPY | Age: 37
End: 2022-05-26
Payer: MEDICAID

## 2022-06-09 ENCOUNTER — HOSPITAL ENCOUNTER (OUTPATIENT)
Dept: PHYSICAL THERAPY | Age: 37
Discharge: HOME | End: 2022-06-09
Payer: MEDICAID

## 2022-06-09 PROCEDURE — 97112 NEUROMUSCULAR REEDUCATION: CPT

## 2022-06-09 PROCEDURE — 97110 THERAPEUTIC EXERCISES: CPT

## 2022-06-09 NOTE — PROGRESS NOTES
PT DAILY TREATMENT NOTE 2-15    Patient Name: Arben Valentin  Date:2022  : 1985  [x]  Patient  Verified  Payor: Bailee Vitale / Plan: Joye Nyhan / Product Type: Managed Care Medicaid /    In time: 997 PM  Out time:  605 PM  Total Treatment Time (min): 60 minutes  Visit #:  23    Treatment Area: Neuropathy [G62.9]    SUBJECTIVE  Pain Level (0-10 scale): 0/10  Any medication changes, allergies to medications, adverse drug reactions, diagnosis change, or new procedure performed?: [x] No    [] Yes (see summary sheet for update)  Subjective functional status/changes:   [] No changes reported  Patient reports she missed visit last week due to have GI illness; went to [place] over the weekend for a friends graduation celebration, notes signif fatigue and difficulty w/ prol walking and attempt to navigate the grand stands      OBJECTIVE    15 min Therapeutic Exercise:  [x] See flow sheet :   Rationale: increase ROM, increase strength, improve coordination, improve balance and increase proprioception to improve the patients ability to ambulate and perform ADLs with less difficulty    45 min Neuromuscular Re-education:  [x]  See flow sheet : balance ex as per chart    Rationale: increase ROM, increase strength, improve coordination, improve balance and increase proprioception  to improve the patients ability to ambulate and perform ADLs with less difficulty or risk of falling    With   [x] TE   [] TA   [] Neuro   [] SC   [] other: Patient Education: [x] Review HEP    [] Progressed/Changed HEP based on:   [] positioning   [] body mechanics   [] transfers   [] heat/ice application    [] other:      Other Objective/Functional Measures: none noted     Pain Level (0-10 scale) post treatment: 0/10    ASSESSMENT/Changes in Function:     Pt w/ signif challenge w/ rebound toss w/ tandem stance - min A required; initially demod signif lack of coordination w/ sit to stand transfers EC but improved by second set; improved performance w/ walking w/ head turn on command     Patient will continue to benefit from skilled PT services to modify and progress therapeutic interventions, address functional mobility deficits, address ROM deficits, address strength deficits, analyze and address soft tissue restrictions, analyze and cue movement patterns, analyze and modify body mechanics/ergonomics, assess and modify postural abnormalities and instruct in home and community integration to attain remaining goals. []  See Plan of Care  []  See progress note/recertification  []  See Discharge Summary         Progress towards goals / Updated goals:  Short Term Goals: To be accomplished in 6 treatments:  1. The Pt will be independent and compliant with their HEP.  Met   2. The Pt will report a 50% reduction in their pain with ADLs.  Met   Long Term Goals: To be accomplished in 16 treatments:  1. The Pt will score the MCII on her FOTO survey demonstrating improved overall function (16 to 39 points). Met   2. The Pt will score >/= 110/120 on the MCTSIB demonstrating improved balance and neuromuscular control.  Progressing towards  3. The Pt will report a 50% reduction in falling. Met   4. The Pt will be able to walk for 20 minutes consecutively without any assistive device to allow the Pt to be able to walk to a concert with less difficulty- progressing  5.  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

## 2022-06-20 ENCOUNTER — APPOINTMENT (OUTPATIENT)
Dept: PHYSICAL THERAPY | Age: 37
End: 2022-06-20
Payer: MEDICAID

## 2022-07-07 ENCOUNTER — HOSPITAL ENCOUNTER (OUTPATIENT)
Dept: PHYSICAL THERAPY | Age: 37
Discharge: HOME | End: 2022-07-07
Payer: MEDICAID

## 2022-07-07 PROCEDURE — 97112 NEUROMUSCULAR REEDUCATION: CPT

## 2022-07-07 PROCEDURE — 97110 THERAPEUTIC EXERCISES: CPT

## 2022-07-07 NOTE — PROGRESS NOTES
PT DAILY TREATMENT NOTE/PROGRESS SUMMARY 2-15    Patient Name: Solomon Apa  Date:2022  : 1985  [x]  Patient  Verified  Payor: [employer]ares Dandy / Plan: Félix Kennedy / Product Type: Managed Care Medicaid /    In time: 5:05 PM  Out time: 6:05 PM  Total Treatment Time (min): 55 minutes  Visit #:  24    Treatment Area: Neuropathy [G62.9]    SUBJECTIVE  Pain Level (0-10 scale): 0/10  Any medication changes, allergies to medications, adverse drug reactions, diagnosis change, or new procedure performed?: [x] No    [] Yes (see summary sheet for update)  Subjective functional status/changes:   [] No changes reported  Patient reports significant improvement in strength and ability to walk since initiating PT however continues to have difficulty with balance      OBJECTIVE    20 min Therapeutic Exercise:  [x] See flow sheet : modify ex as per chart, reassessment performed    Rationale: increase ROM, increase strength, improve coordination, improve balance and increase proprioception to improve the patients ability to ambulate and perform ADLs with less difficulty    35 min Neuromuscular:  [x]  See flow sheet : gait/balance ex as per chart   Rationale: increase ROM, increase strength, improve coordination, improve balance and increase proprioception  to improve the patients ability to ambulate and perform ADLs with less difficulty    With   [x] TE   [] TA   [] Neuro   [] SC   [] other: Patient Education: [x] Review HEP    [] Progressed/Changed HEP based on:   [] positioning   [] body mechanics   [] transfers   [] heat/ice application    [] other:      Other Objective/Functional Measures:  FOTO 48/100 (16/100 at initial evaluation)     Pain Level (0-10 scale) post treatment: 0/10    ASSESSMENT/Changes in Function:     The Pt has been seen for 24 outpatient physical therapy sessions with gait and balance impairments secondary to neuropathy.  The Pt's therapy program has focused on improving her LE strength and stability, improving her hip and core strength, improving her balance and neuromuscular control, correcting her gait impairments, and improving her activity tolerance and endurance via therapeutic exercises. She is progressing nicely with her higher level balance training and able to perform more functional and challenging exercises with less need for external support. Patient will continue to benefit from skilled PT services to modify and progress therapeutic interventions, address functional mobility deficits, address ROM deficits, address strength deficits, analyze and address soft tissue restrictions, analyze and cue movement patterns, analyze and modify body mechanics/ergonomics, assess and modify postural abnormalities and instruct in home and community integration to attain remaining goals. []  See Plan of Care  [x]  See progress note/recertification  []  See Discharge Summary         Progress towards goals / Updated goals:  Short Term Goals: To be accomplished in 6 treatments:  1. The Pt will be independent and compliant with their HEP.  Met   2. The Pt will report a 50% reduction in their pain with ADLs.  Met   Long Term Goals: To be accomplished in 16 treatments:  1. The Pt will score the MCII on her FOTO survey demonstrating improved overall function (16 to 39 points). Met   2. The Pt will score >/= 110/120 on the MCTSIB demonstrating improved balance and neuromuscular control.  Progressing towards  3. The Pt will report a 50% reduction in falling. Met   4. The Pt will be able to walk for 20 minutes consecutively without any assistive device to allow the Pt to be able to walk to a concert with less difficulty- progressing  5.  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

## 2022-07-14 ENCOUNTER — HOSPITAL ENCOUNTER (OUTPATIENT)
Dept: PHYSICAL THERAPY | Age: 37
Discharge: HOME | End: 2022-07-14
Payer: MEDICAID

## 2022-07-14 PROCEDURE — 97112 NEUROMUSCULAR REEDUCATION: CPT

## 2022-07-14 PROCEDURE — 97110 THERAPEUTIC EXERCISES: CPT

## 2022-07-14 NOTE — PROGRESS NOTES
PT DAILY TREATMENT NOTE 2-15    Patient Name: Colon Sorrel  Date:2022  : 1985  [x]  Patient  Verified  Payor: Ashwin Orantes / Plan: [facility] / Product Type: Managed Care Medicaid /    In time: 867 PM  Out time: 540 PM  Total Treatment Time (min):  65 minutes  Visit #:  25    Treatment Area: Neuropathy [G62.9]    SUBJECTIVE  Pain Level (0-10 scale): 2/10  Any medication changes, allergies to medications, adverse drug reactions, diagnosis change, or new procedure performed?: [x] No    [] Yes (see summary sheet for update)  Subjective functional status/changes:   [] No changes reported  Patient reports increased fatigue today \"feel like already done a work out and now I'm going to do another one\"; pt reports that she realized that her increased LE sx may correlate with becoming sexually active ~ 4-6 weeks ago       OBJECTIVE    15 min Therapeutic Exercise:  [x] See flow sheet :   Rationale: increase ROM, increase strength, improve coordination, improve balance and increase proprioception to improve the patients ability to ambulate and perform ADLs with less difficulty    45 min Neuromuscular Re-education:  [x]  See flow sheet : gait/balance ex as per chart    Rationale: increase ROM, increase strength, improve coordination, improve balance and increase proprioception  to improve the patients ability to ambulate and perform ADLs with less difficulty or risk of falling    With   [x] TE   [] TA   [] Neuro   [] SC   [] other: Patient Education: [x] Review HEP    [] Progressed/Changed HEP based on:   [] positioning   [] body mechanics   [] transfers   [] heat/ice application    [] other:      Other Objective/Functional Measures: none noted     Pain Level (0-10 scale) post treatment: 1/10    ASSESSMENT/Changes in Function:     Challenged w/ sequencing with ladder gait activities - greatest challenge w/ stepping backward or backward/lateral; strength and stability w/ tball ex improving Patient will continue to benefit from skilled PT services to modify and progress therapeutic interventions, address functional mobility deficits, address ROM deficits, address strength deficits, analyze and address soft tissue restrictions, analyze and cue movement patterns, analyze and modify body mechanics/ergonomics, assess and modify postural abnormalities and instruct in home and community integration to attain remaining goals. []  See Plan of Care  []  See progress note/recertification  []  See Discharge Summary         Progress towards goals / Updated goals:  Short Term Goals: To be accomplished in 6 treatments:  1. The Pt will be independent and compliant with their HEP.  Met   2. The Pt will report a 50% reduction in their pain with ADLs.  Met   Long Term Goals: To be accomplished in 16 treatments:  1. The Pt will score the MCII on her FOTO survey demonstrating improved overall function (16 to 39 points). Met   2. The Pt will score >/= 110/120 on the MCTSIB demonstrating improved balance and neuromuscular control.  Progressing towards  3. The Pt will report a 50% reduction in falling. Met   4. The Pt will be able to walk for 20 minutes consecutively without any assistive device to allow the Pt to be able to walk to a concert with less difficulty- progressing  5.  The Pt will be able to stand independently without any external support for >/= 5 minutes to allow the Pt to be able to assist customers at work without having to hold onto something- progressing    PLAN  [x]  Upgrade activities as tolerated     [x]  Continue plan of care  [x]  Update interventions per flow sheet       []  Discharge due to:_  []  Other:_      Landon Shannon, PT 7/14/2022

## 2022-07-28 ENCOUNTER — APPOINTMENT (OUTPATIENT)
Dept: PHYSICAL THERAPY | Age: 37
End: 2022-07-28
Payer: MEDICAID

## 2022-08-04 ENCOUNTER — HOSPITAL ENCOUNTER (OUTPATIENT)
Dept: PHYSICAL THERAPY | Age: 37
Discharge: HOME | End: 2022-08-04
Payer: MEDICAID

## 2022-08-04 PROCEDURE — 97530 THERAPEUTIC ACTIVITIES: CPT

## 2022-08-04 PROCEDURE — 97110 THERAPEUTIC EXERCISES: CPT

## 2022-08-04 PROCEDURE — 97112 NEUROMUSCULAR REEDUCATION: CPT

## 2022-08-04 NOTE — THERAPY DISCHARGE
[facility]  [address] [facility], [facility] [address]  Phone: [phone] Fax: [phone]    Medicaid Discharge Summary  2-15    Patient name: Beto Jamil  : 1985  Provider#: [id number]  Referral source: Colten Gilmore DO      Medical/Treatment Diagnosis: Neuropathy [G62.9]     Prior Hospitalization: see medical history     Comorbidities: See Plan of Care  Prior Level of Function:See Plan of Care  Medications: Verified on Patient Summary List    Start of Care: 21    Onset Date:    Visits from Start of Care: 26    Missed Visits: 7  Reporting Period : 21 to 22    ASSESSMENT/SUMMARY OF CARE: The patient is a 40year old female who has participated in 32 skilled physical therapy visits due to gait and balance impairments secondary to neuropathy. She demonstrates improved righting reactions and postural control under various conditions, as evidenced by a score of 101.5/120 on MCTSIB today. Additionally, she reports no recent falls and has been able to walk and stand for longer periods of time with minimal external support and with decreased required rest breaks. She has met 6/7 physical therapy goals at this time, and plans to transition to local [facility] for continued maintenance and gains. Due to demonstrated significant subjective and objective progress over physical therapy plan of care, 6/7 physical therapy goals met, and demonstrated independence with updated and progressive HEP, the patient no longer requires skilled physical therapy services and is discharged to independent and progressive [facility]/local fitness center for continued maintenance at this date. Short Term Goals: To be accomplished in 6 treatments:  1. The Pt will be independent and compliant with their HEP. - Met  2. The Pt will report a 50% reduction in their pain with ADLs. - Met   Long Term Goals: To be accomplished in 16 treatments:  1. The Pt will score the MCII on her FOTO survey demonstrating improved overall function (16 to 39 points). - Met  2. The Pt will score >/= 110/120 on the MCTSIB demonstrating improved balance and neuromuscular control. - Not Met  3. The Pt will report a 50% reduction in falling. - Met  4. The Pt will be able to walk for 20 minutes consecutively without any assistive device to allow the Pt to be able to walk to a concert with less difficulty - Met  5. 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 Retain this original for your records. If you are unable to process this request in 24 hours, please contact our office.      [de-identified] Signature:____________________  Date:____________Time:_________           Alee Akhtar DO

## 2022-08-04 NOTE — PROGRESS NOTES
PT DAILY TREATMENT NOTE 2-15    Patient Name: Tracey Sinclair  Date:2022  : 1985  [x]  Patient  Verified  Payor: Joshua Nolasco / Plan: [id number] [employer] / Product Type: Managed Care Medicaid /    In time:   Out time:   Total Treatment Time (min): 48  Visit #:  26    Treatment Area: Neuropathy [G62.9]    SUBJECTIVE  Pain Level (0-10 scale): 0  Any medication changes, allergies to medications, adverse drug reactions, diagnosis change, or new procedure performed?: [x] No    [] Yes (see summary sheet for update)  Subjective functional status/changes:   [] No changes reported    The patient reports she has been working hard and [place] significant time at the river and the pool over the summer; she went for a 20 minute walk at the park yesterday. She feels she is 85% improved over physical therapy plan of care. She notes that walking down stairs is still very challenging, and she tends to use handrails for safety. Her legs do not get so shaky anymore when she is standing still, and she was able to stand at least five minutes without support while talking with a friend the other day. She plans to join the [facility] for continued maintenance and gains moving forward. OBJECTIVE    8 min Therapeutic Exercise:  [x] See flow sheet :   Rationale: increase ROM, increase strength, improve coordination, improve balance and increase proprioception to improve the patients ability to ambulate and perform ADLs with less difficulty    30 min Therapeutic Activity:  []  See flow sheet : Includes time spent educating patient on physical therapy plan of care, as well as appropriate HEP/fitness center maintenance 3-4x/week at minimum to maintain therapeutic gains, with emphasis on maintaining safety during exercise with use of guarding and aquatic environment. Patient verbalizing and demonstrating understanding of provided education with 100% accuracy using teach back method.    Rationale: increase ROM, increase strength, improve coordination, improve balance, and increase proprioception  to improve the patients ability to self-manage current condition     10 min Neuromuscular Re-education:  [x]  See flow sheet : Includes time spent on MCTSIB assessment (see hard chart for details)   Rationale: increase ROM, increase strength, improve coordination, improve balance and increase proprioception  to improve the patients ability to ambulate and perform ADLs with less difficulty or risk of falling    With   [x] TE   [] TA   [x] Neuro   [] SC   [] other: Patient Education: [x] Review HEP    [] Progressed/Changed HEP based on:   [] positioning   [] body mechanics   [] transfers   [] heat/ice application    [] other:      Other Objective/Functional Measures: None noted     Pain Level (0-10 scale) post treatment: 0    ASSESSMENT/Changes in Function:   The patient is a 40year old female who has participated in 32 skilled physical therapy visits due to gait and balance impairments secondary to neuropathy. She demonstrates improved righting reactions and postural control under various conditions, as evidenced by a score of 101.5/120 on MCTSIB today. Additionally, she reports no recent falls and has been able to walk and stand for longer periods of time with minimal external support and with decreased required rest breaks. She has met 6/7 physical therapy goals at this time, and plans to transition to local [facility] for continued maintenance and gains. Due to demonstrated significant subjective and objective progress over physical therapy plan of care, 6/7 physical therapy goals met, and demonstrated independence with updated and progressive HEP, the patient no longer requires skilled physical therapy services and is discharged to independent and progressive HEP/local fitness center for continued maintenance at this date.       []  See Plan of Care  []  See progress note/recertification  [x]  See Discharge Summary         Progress towards goals / Updated goals:  Short Term Goals: To be accomplished in 6 treatments:  1. The Pt will be independent and compliant with their HEP. - Met  2. The Pt will report a 50% reduction in their pain with ADLs. - Met   Long Term Goals: To be accomplished in 16 treatments:  1. The Pt will score the MCII on her FOTO survey demonstrating improved overall function (16 to 39 points). - Met  2. The Pt will score >/= 110/120 on the MCTSIB demonstrating improved balance and neuromuscular control. - Not Met  3. The Pt will report a 50% reduction in falling. - Met  4. The Pt will be able to walk for 20 minutes consecutively without any assistive device to allow the Pt to be able to walk to a concert with less difficulty - Met  5.  The Pt will be able to stand independently without any external support for >/= 5 minutes to allow the Pt to be able to assist customers at work without having to hold onto something - Met    PLAN  []  Upgrade activities as tolerated     []  Continue plan of care  []  Update interventions per flow sheet       [x]  Discharge due to: Patient has met 6/7 physical therapy goals and demonstrates independence with updated and progressive HEP at this time  []  Other:_      Ashtyn Arce, PT, DPT 8/4/2022

## 2022-10-27 ENCOUNTER — OFFICE VISIT (OUTPATIENT)
Dept: NEUROLOGY | Age: 37
End: 2022-10-27
Payer: MEDICAID

## 2022-10-27 VITALS
RESPIRATION RATE: 18 BRPM | WEIGHT: 131.2 LBS | SYSTOLIC BLOOD PRESSURE: 126 MMHG | HEIGHT: 63 IN | DIASTOLIC BLOOD PRESSURE: 82 MMHG | BODY MASS INDEX: 23.25 KG/M2 | HEART RATE: 106 BPM | OXYGEN SATURATION: 97 % | TEMPERATURE: 98.1 F

## 2022-10-27 DIAGNOSIS — G62.9 SENSORY NEUROPATHY: Primary | ICD-10-CM

## 2022-10-27 PROCEDURE — 99214 OFFICE O/P EST MOD 30 MIN: CPT | Performed by: INTERNAL MEDICINE
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
## 2022-10-27 NOTE — PROGRESS NOTES
Neurology Note    Patient ID:  Erum Mcarthur  [id number]  40 y.o.  1985        Assessment and Plan:  41 yo F with length-dependent peripheral neuropathy, sensory axonal, large-fiber likely secondary to nutritional deficiency and toxic etiology from alcohol use. Labs unremarkable for any autoimmune or alternate vitamin deficiency. Slowly progressive. Patient has completed PT with some improvement in her balance. Still uses a cane and has falls. Interested in obtaining disability as her job requires her to stand and [employer]. We discussed prognosis and education on avoidance of risk factors. Pain is controlled with Lyrica. Recommendations:   - continue Lyrica for pain   - continue PT/OT exercises at home   - recommend steady aerobic exercise such as walking, as long as safe  - minimize alcohol use   - no need for follow up unless needed PRN  - discussed using a walker if patient is interested, she will think about this and get back to us if she requires a script     I spent 30 minutes providing care to this patient with >50% of the time spent counseling. History of Present Illness:   Erum Mcarthur is a 40 y.o. female with history of alcohol use disorder (since age 15) and depression who was seen initially by Dr. Kyla Severe for numbness and tingling in the hands and feet, symmetric. She has difficulty with opening jars and water bottles given the numbness in her hands. She also feels like her balance has been off since the development of numbness and tingling in her feet. She is on Librium for AUD. She has some pain when touching objects or walking in the hands and feet. Continues to report that she cannot use her fingertips to open jars and bottles. She has difficulty with balance and gait, unsteady with stairs. Per Dr. Kelly Marte prior note: \"Also we discussed her alcohol use and she says that she has been drinking since the age of 15 1-1/2 glasses of hard liquor a day most recently.   She has gone approximately 1 week without drinking and did not experience any withdrawal symptoms. She is currently on Librium for her alcohol use. \"    Interval history:   The patient has completed PT from January 2022-August. She has noticed improvement in her steadiness and gait, but still cannot feel objects in her hands, has falls if she doesn't use her cane, and has difficulty when she is walking on surfaces that are uneven. She has more strength since completing PT. She requires a cane outside for steadiness and does furniture walking at home. Pain is controlled. 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 Past Medical History:   Diagnosis Date    Bartholin cyst     Infectious disease 2012    MRSA in left and right axillary        Past Surgical History:   Procedure Laterality Date    HX OTHER SURGICAL Left 2019    Elbow        Family History   Problem Relation Age of Onset    No Known Problems Mother     No Known Problems Father     Psoriasis Sister     Bipolar Disorder Brother     Schizophrenia Brother     Heart Disease Maternal Uncle     Heart Disease Maternal Grandmother     Heart Disease Maternal Grandfather         Social History     Tobacco Use    Smoking status: Every Day     Packs/day: 1.00     Years: 24.00     Pack years: 24.00     Types: Cigarettes    Smokeless tobacco: Never   Substance Use Topics    Alcohol use: Yes     Alcohol/week: 5.8 standard drinks     Types: 7 Shots of liquor per week     Comment: 2-3/week        No Known Allergies     Prior to Admission medications    Medication Sig Start Date End Date Taking? Authorizing Provider   ibuprofen (MOTRIN) 800 mg tablet Take 1 Tablet by mouth every eight (8) hours as needed for Pain. 5/12/22  Yes Shira Barrett MD   pregabalin (LYRICA) 75 mg capsule Take 1 Capsule by mouth two (2) times a day.  Max Daily Amount: 150 mg. 4/8/22  Yes Ariana Hahn MD traZODone (DESYREL) 100 mg tablet TAKE 1 TABLET BY MOUTH EVERY NIGHT 6/28/21  Yes Franklyn Wallace MD   levonorgestreL (MIRENA) 20 mcg/24 hours (8 yrs) 52 mg IUD 1 Each by IntraUTERine route once. Yes Provider, Historical       Review of Systems:    General, constitutional: negative  Eyes, vision: negative  Ears, nose, throat: negative  Cardiovascular, heart: negative  Respiratory: negative  Gastrointestinal: negative  Genitourinary: negative  Musculoskeletal: negative  Skin and integumentary: negative  Psychiatric: negative  Endocrine: negative  Neurological: negative, except for HPI  Hematologic/lymphatic: negative  Allergy/immunology: negative    []Unable to obtain  ROS due to  []mental status change  []sedated   []intubated    Objective:     Visit Vitals  /82 (BP 1 Location: Left upper arm, BP Patient Position: Sitting, BP Cuff Size: Small adult)   Pulse (!) 106   Temp 98.1 °F (36.7 °C) (Temporal)   Resp 18   Ht 5' 3\" (1.6 m)   Wt 131 lb 3.2 oz (59.5 kg)   SpO2 97%   BMI 23.24 kg/m²       Physical Exam:      General:  appears well nourished in no acute distress  Neck: no obvious deformity or masses  Lungs: comfortable on room air  Heart:  well-perfused   Lower extremity: no edema  Skin: intact    Neurological exam:  Awake, alert, oriented to person, place and time  Recent and remote memory were normal  Attention and concentration were intact  Language was intact. There was no aphasia  Speech: no dysarthria  Fund of knowledge was preserved    Cranial nerves:   II-XII were tested    PERRRLA  Visual fields were full to finger counting   EOMI, no evidence of nystagmus  Facial sensation:  normal and symmetric  Facial motor: normal and symmetric  Hearing intact  SCM strength intact  Tongue: midline without fasciculations    Motor: Tone normal in upper and lower extremities     Strength testing:   deltoid triceps biceps Wrist ext. Wrist flex. intrinsics Hip flex. Hip ext. Knee ext.   Knee flex Dorsi flex Plantar flex   Right 5 5 5 5 5 5 5 5 5 5 5 5   Left 5 5 5 5 5 5 5 5 5 5 5 5   Toe extensors R 5/5 L 5/5     Sensory:  Upper extremity:diminished to pinprick distal to the wrists bilaterally; light touch, and vibration > 10 seconds, and proprioception  Lower extremity: diminished to pinprick distal to the knees bilaterally. light touch, and vibration diminished at the toes and ankles compared to knee bilaterally, and proprioception is intact bilaterally. Reflexes:    Right Left  Biceps  2 2  Triceps  2 2  Brachiorad. 2 2  Patella  2 2  Achilles Absent absent    Plantar response: mute bilaterally     Cerebellar testing:  no tremor apparent, finger/nose and rapid alternating movements were intact    Romberg: absent    Gait: steady. Uses a cane for balance. Labs:     Lab Results   Component Value Date/Time    Hemoglobin A1c 4.5 (L) 01/04/2021 10:16 AM    Sodium 130 (L) 07/04/2021 04:50 PM    Potassium 4.3 07/04/2021 04:50 PM    Chloride 95 (L) 07/04/2021 04:50 PM    Glucose 171 (H) 07/04/2021 04:50 PM    BUN 11 07/04/2021 04:50 PM    Creatinine 1.06 (H) 07/04/2021 04:50 PM    Calcium 10.1 07/04/2021 04:50 PM    WBC 11.8 (H) 07/04/2021 04:50 PM    HCT 43.6 07/04/2021 04:50 PM    HGB 15.6 07/04/2021 04:50 PM    PLATELET 615 01/92/5281 04:50 PM       Imaging:    No results found for this or any previous visit. Results from [place] encounter on 06/11/19    CT HEAD WO CONT    Narrative  EXAM: CT HEAD WO CONT    INDICATION: Head injury mild or moderate acute, no neurological deficit    COMPARISON: None. CONTRAST: None. TECHNIQUE: Unenhanced CT of the head was performed using 5 mm images. Brain and  bone windows were generated. CT dose reduction was achieved through use of a  standardized protocol tailored for this examination and automatic exposure  control for dose modulation. FINDINGS:  The ventricles and sulci are normal in size, shape and configuration and  midline.  Bifrontal volume loss is present. There is no significant white matter  disease. There is no intracranial hemorrhage, extra-axial collection, mass, mass  effect or midline shift. The basilar cisterns are open. No acute infarct is  identified. The bone windows demonstrate no abnormalities. The visualized  portions of the paranasal sinuses and mastoid air cells are clear.     Impression  IMPRESSION: No acute intracranial process seen             Patient Active Problem List   Diagnosis Code    Hypomagnesemia E83.42    Folate deficiency E53.8    Ingrown nail L60.0    Macrocytosis without anemia D75.89    Hypokalemia E87.6    Alcohol abuse F10.10                   Signed By:   Felipe Martinez DO  Neurophysiology      October 27, 2022

## 2022-11-01 DIAGNOSIS — G62.9 SENSORY NEUROPATHY: ICD-10-CM

## 2022-11-01 RX ORDER — PREGABALIN 75 MG/1
CAPSULE ORAL
Qty: 60 CAPSULE | Refills: 0 | Status: SHIPPED | OUTPATIENT
Start: 2022-11-01

## 2023-01-24 ENCOUNTER — TELEPHONE (OUTPATIENT)
Dept: NEUROLOGY | Age: 38
End: 2023-01-24

## 2023-01-24 NOTE — TELEPHONE ENCOUNTER
LAZARUS Rhodes is faxing over disability forms. She doesn't think she filled them out correctly. Wants Us to call her to go over forms.  Please call [phone]

## 2023-02-21 ENCOUNTER — TELEPHONE (OUTPATIENT)
Dept: NEUROLOGY | Age: 38
End: 2023-02-21

## 2023-02-21 NOTE — TELEPHONE ENCOUNTER
Patient stated that she missed a call from [address] [place] [address] and is returning the call.   [phone]

## 2023-02-21 NOTE — TELEPHONE ENCOUNTER
Verified patient with 2 identifiers   Patient had brought in completed ( her part and provider part) FMLA forms. Requesting Dr Kemal Castro complete the areas she was unsure of, sign and return to patient. Advised patient that Dr Kemal Castro needs to complete the entire provider section. Advised that since she ( the patient) completed the provider section, it needs to be redone by Dr Kemal Castro. Advised it would not be accepted in her writing. Patient verified understanding and states she will fax new forms to us.

## 2023-02-22 ENCOUNTER — DOCUMENTATION ONLY (OUTPATIENT)
Dept: NEUROLOGY | Age: 38
End: 2023-02-22

## 2023-02-22 NOTE — PROGRESS NOTES
Verified patient with 2 identifiers   Advised Dr Lakshmi Ponce portion regarding the disability forms are completed. Advised she needs to come in to complete her portion and then I will fax it. Also advised her DMV form completed and signed by Dr Lakshmi Ponce and is ready for . Patient verified understanding and will come by.

## 2023-02-24 ENCOUNTER — TELEPHONE (OUTPATIENT)
Dept: NEUROLOGY | Age: 38
End: 2023-02-24

## 2023-02-24 NOTE — TELEPHONE ENCOUNTER
Left message on Identified VM that I believe she has to turn them in to her into her employer.  Advised to call office at [phone] with any further questions

## 2023-02-24 NOTE — TELEPHONE ENCOUNTER
Patient called to discuss with Nurse where she needs to fax her disability forms to.  Please call [phone]

## 2023-03-03 ENCOUNTER — PATIENT MESSAGE (OUTPATIENT)
Dept: NEUROLOGY | Age: 38
End: 2023-03-03

## 2023-03-16 DIAGNOSIS — G62.9 SENSORY NEUROPATHY: ICD-10-CM

## 2023-03-16 RX ORDER — PREGABALIN 75 MG/1
CAPSULE ORAL
Qty: 60 CAPSULE | Refills: 0 | Status: SHIPPED | OUTPATIENT
Start: 2023-03-16

## 2023-03-16 NOTE — TELEPHONE ENCOUNTER
PCP: Amanda Liu MD     Last appt: 5/13/2022   No future appointments. Requested Prescriptions     Pending Prescriptions Disp Refills    pregabalin (LYRICA) 75 mg capsule 60 Capsule 0     Sig: TAKE 1 CAPSULE BY MOUTH TWICE DAILY.  MAX DAILY AMOUNT: 150 MG

## 2023-03-16 NOTE — TELEPHONE ENCOUNTER
----- Message from Del Bell sent at 3/16/2023 10:28 AM EDT -----  Subject: Refill Request    QUESTIONS  Name of Medication? pregabalin (LYRICA) 75 mg capsule  Patient-reported dosage and instructions? TAKE 1 CAPSULE BY MOUTH TWICE   DAILY. MAX DAILY AMOUNT? 150 MG  How many days do you have left? 2  Preferred Pharmacy? [facility] #[id number]  Pharmacy phone number (if available)? [phone]  ---------------------------------------------------------------------------  --------------  Sangeeta DAVIES  What is the best way for the office to contact you? OK to leave message on   voicemail  Preferred Call Back Phone Number? [phone]  ---------------------------------------------------------------------------  --------------  SCRIPT ANSWERS  Relationship to Patient?  Self

## 2023-05-15 ENCOUNTER — TELEPHONE (OUTPATIENT)
Age: 38
End: 2023-05-15

## 2023-05-15 NOTE — TELEPHONE ENCOUNTER
Patient called stating that she is trying to file for disability but was told by her  that she will need to start all over. Pt will need another appt, EMG and possibly a CT scan. Pt requested call back from Nurse or Dr Margaret Garay to discuss everything.  Please call   [phone]

## 2023-05-16 NOTE — TELEPHONE ENCOUNTER
Verified patient with 2 identifiers   Advised per Dr Martinez Kaufman :Jeancarlos Liz can let her know that we are happy to repeat EMG for her and provide her with our prior office notes but Dr. Martinez Kaufman is no longer going to be in the clinic and therefore is not going to be filling out disability paperwork since this is an ongoing process, it should be filled out by her PCP and we can provide our notes from them, for them to be able to fill them out  Patient verified understanding. Repeat EMG is scheduled for 7/10/23.

## 2023-07-10 ENCOUNTER — PROCEDURE VISIT (OUTPATIENT)
Age: 38
End: 2023-07-10
Payer: MEDICAID

## 2023-07-10 DIAGNOSIS — G62.9 POLYNEUROPATHY, UNSPECIFIED: Primary | ICD-10-CM

## 2023-07-10 DIAGNOSIS — G62.9 NEUROPATHY: ICD-10-CM

## 2023-07-10 PROCEDURE — 95885 MUSC TST DONE W/NERV TST LIM: CPT | Performed by: INTERNAL MEDICINE

## 2023-07-10 PROCEDURE — 95910 NRV CNDJ TEST 7-8 STUDIES: CPT | Performed by: INTERNAL MEDICINE

## 2023-07-13 ENCOUNTER — CLINICAL DOCUMENTATION (OUTPATIENT)
Age: 38
End: 2023-07-13

## 2023-08-28 ENCOUNTER — TELEPHONE (OUTPATIENT)
Age: 38
End: 2023-08-28

## 2023-08-28 DIAGNOSIS — G62.9 POLYNEUROPATHY, UNSPECIFIED: Primary | ICD-10-CM

## 2023-08-28 RX ORDER — PREGABALIN 75 MG/1
CAPSULE ORAL
Qty: 60 CAPSULE | Refills: 0 | Status: SHIPPED | OUTPATIENT
Start: 2023-08-28 | End: 2023-08-29 | Stop reason: SDUPTHER

## 2023-08-28 NOTE — TELEPHONE ENCOUNTER
Pt request rx for pregabalin (LYRICA) 75 MG capsule    States she is out of medication and is requesting a refill on this. She has requested from her primary care and they have not responded to her or the pharmacy. Please call.      [address] [facility] [address] [phone] - F [phone]

## 2023-08-28 NOTE — TELEPHONE ENCOUNTER
PCP: Richard Ashraf MD     Last appt:  5/13/2022    No future appointments. Requested Prescriptions     Pending Prescriptions Disp Refills    pregabalin (LYRICA) 75 MG capsule 60 capsule 0     Sig: TAKE 1 CAPSULE BY MOUTH TWICE DAILY.  MAX DAILY AMOUNT: 150 MG

## 2023-08-29 DIAGNOSIS — G62.9 POLYNEUROPATHY, UNSPECIFIED: ICD-10-CM

## 2023-08-29 RX ORDER — PREGABALIN 75 MG/1
CAPSULE ORAL
Qty: 60 CAPSULE | Refills: 0 | Status: SHIPPED | OUTPATIENT
Start: 2023-08-29 | End: 2023-09-29

## 2023-09-22 ENCOUNTER — TELEPHONE (OUTPATIENT)
Facility: CLINIC | Age: 38
End: 2023-09-22

## 2023-09-22 DIAGNOSIS — G62.9 POLYNEUROPATHY, UNSPECIFIED: ICD-10-CM

## 2023-09-22 RX ORDER — PREGABALIN 75 MG/1
CAPSULE ORAL
Qty: 60 CAPSULE | Refills: 0 | OUTPATIENT
Start: 2023-09-22 | End: 2023-10-23

## 2023-09-22 NOTE — TELEPHONE ENCOUNTER
----- Message from Lena Morton MA sent at 9/22/2023  3:56 PM EDT -----  Subject: Refill Request    QUESTIONS  Name of Medication? pregabalin (LYRICA) 75 MG capsule  Patient-reported dosage and instructions? twice daily  How many days do you have left? 20  Preferred Pharmacy? [address] [place] [address] #[id number]  Pharmacy phone number (if available)? [phone]  ---------------------------------------------------------------------------  --------------  Katie VALADEZ  What is the best way for the office to contact you? OK to respond with   electronic message via [employer] portal (only for patients who have   registered [employer] account)  Preferred Call Back Phone Number? [phone]  ---------------------------------------------------------------------------  --------------  SCRIPT ANSWERS  Relationship to Patient?  Self

## 2023-11-06 ENCOUNTER — HOSPITAL ENCOUNTER (EMERGENCY)
Facility: HOSPITAL | Age: 38
Discharge: HOME | End: 2023-11-06
Payer: MEDICAID

## 2023-11-06 VITALS
WEIGHT: 129.19 LBS | DIASTOLIC BLOOD PRESSURE: 66 MMHG | TEMPERATURE: 98.8 F | BODY MASS INDEX: 22.88 KG/M2 | RESPIRATION RATE: 18 BRPM | HEART RATE: 101 BPM | OXYGEN SATURATION: 96 % | SYSTOLIC BLOOD PRESSURE: 97 MMHG

## 2023-11-06 DIAGNOSIS — Z71.1 CONCERN ABOUT STD IN FEMALE WITHOUT DIAGNOSIS: ICD-10-CM

## 2023-11-06 DIAGNOSIS — N76.6 GENITAL LABIAL ULCER: Primary | ICD-10-CM

## 2023-11-06 DIAGNOSIS — N30.00 ACUTE CYSTITIS WITHOUT HEMATURIA: ICD-10-CM

## 2023-11-06 LAB
APPEARANCE UR: ABNORMAL
BACTERIA URNS QL MICRO: ABNORMAL /HPF
BILIRUB UR QL CFM: NEGATIVE
C TRACH DNA SPEC QL NAA+PROBE: NEGATIVE
CLUE CELLS VAG QL WET PREP: NORMAL
COLOR UR: ABNORMAL
EPITH CASTS URNS QL MICRO: ABNORMAL /LPF
GLUCOSE UR STRIP.AUTO-MCNC: 100 MG/DL
HGB UR QL STRIP: NEGATIVE
KETONES UR QL STRIP.AUTO: ABNORMAL MG/DL
KOH PREP SPEC: NORMAL
LEUKOCYTE ESTERASE UR QL STRIP.AUTO: ABNORMAL
MUCOUS THREADS URNS QL MICRO: ABNORMAL /LPF
N GONORRHOEA DNA SPEC QL NAA+PROBE: NEGATIVE
NITRITE UR QL STRIP.AUTO: POSITIVE
PH UR STRIP: 7 (ref 5–8)
PROT UR STRIP-MCNC: 30 MG/DL
RBC #/AREA URNS HPF: ABNORMAL /HPF (ref 0–5)
SAMPLE TYPE: NORMAL
SERVICE CMNT-IMP: NORMAL
SERVICE CMNT-IMP: NORMAL
SP GR UR REFRACTOMETRY: 1.01 (ref 1–1.03)
SPECIMEN SOURCE: NORMAL
T VAGINALIS VAG QL WET PREP: NORMAL
URINE CULTURE IF INDICATED: ABNORMAL
UROBILINOGEN UR QL STRIP.AUTO: 4 EU/DL (ref 0.2–1)
WBC URNS QL MICRO: ABNORMAL /HPF (ref 0–4)

## 2023-11-06 PROCEDURE — 96372 THER/PROPH/DIAG INJ SC/IM: CPT

## 2023-11-06 PROCEDURE — 87591 N.GONORRHOEAE DNA AMP PROB: CPT

## 2023-11-06 PROCEDURE — 99285 EMERGENCY DEPT VISIT HI MDM: CPT

## 2023-11-06 PROCEDURE — 87255 GENET VIRUS ISOLATE HSV: CPT

## 2023-11-06 PROCEDURE — 87210 SMEAR WET MOUNT SALINE/INK: CPT

## 2023-11-06 PROCEDURE — 6370000000 HC RX 637 (ALT 250 FOR IP): Performed by: PHYSICIAN ASSISTANT

## 2023-11-06 PROCEDURE — 87086 URINE CULTURE/COLONY COUNT: CPT

## 2023-11-06 PROCEDURE — 6360000002 HC RX W HCPCS: Performed by: PHYSICIAN ASSISTANT

## 2023-11-06 PROCEDURE — 2500000003 HC RX 250 WO HCPCS: Performed by: PHYSICIAN ASSISTANT

## 2023-11-06 PROCEDURE — 87491 CHLMYD TRACH DNA AMP PROBE: CPT

## 2023-11-06 PROCEDURE — 81001 URINALYSIS AUTO W/SCOPE: CPT

## 2023-11-06 RX ORDER — METRONIDAZOLE 7.5 MG/G
1 GEL VAGINAL DAILY
Qty: 5 EACH | Refills: 0 | Status: SHIPPED | OUTPATIENT
Start: 2023-11-06 | End: 2023-11-11

## 2023-11-06 RX ORDER — VALACYCLOVIR HYDROCHLORIDE 1 G/1
1000 TABLET, FILM COATED ORAL 3 TIMES DAILY
Qty: 30 TABLET | Refills: 0 | Status: SHIPPED | OUTPATIENT
Start: 2023-11-06 | End: 2023-11-16

## 2023-11-06 RX ORDER — CEPHALEXIN 500 MG/1
500 CAPSULE ORAL 3 TIMES DAILY
Qty: 21 CAPSULE | Refills: 0 | Status: SHIPPED | OUTPATIENT
Start: 2023-11-06 | End: 2023-11-13

## 2023-11-06 RX ORDER — AZITHROMYCIN 250 MG/1
1000 TABLET, FILM COATED ORAL
Status: COMPLETED | OUTPATIENT
Start: 2023-11-06 | End: 2023-11-06

## 2023-11-06 RX ADMIN — LIDOCAINE HYDROCHLORIDE 500 MG: 10 INJECTION, SOLUTION EPIDURAL; INFILTRATION; INTRACAUDAL; PERINEURAL at 13:26

## 2023-11-06 RX ADMIN — AZITHROMYCIN 1000 MG: 250 TABLET, FILM COATED ORAL at 13:27

## 2023-11-06 NOTE — ED PROVIDER NOTES
[facility] EMERGENCY DEPT  EMERGENCY DEPARTMENT ENCOUNTER       Pt Name: Uzair Gaona  MRN: [id number]  [address] [facility] [place] 1985  Date of evaluation: 11/6/2023  Provider: PATRIZIA Andres   PCP: Alexander Soler MD  Note Started: 12:59 PM EST 11/6/23     CHIEF COMPLAINT       Chief Complaint   Patient presents with    Hemorrhoids     Patient ambulatory into ED c/o hemorrhoids x 4 months, reports issues with pain and bleeding. Also c/o \"horrible yeast infection\" which she believes is related to the hemorrhoids. HISTORY OF PRESENT ILLNESS: 1 or more elements      History From: Patient  HPI Limitations: None     Uzair Gaona is a 45 y.o. female who presents by POV with complaints of vaginal pain and rectal pain. She reports that she has had external hemorrhoids for several months. She has not followed up for these. Then she developed vaginal pain with itching and discharge. She is concerned she may have a UTI. She reports that she is also concerned about potential for STD as her partner is known to be unfaithful. She denies chance of pregnancy. She has an IUD in place. Nursing Notes were all reviewed and agreed with or any disagreements were addressed in the HPI. REVIEW OF SYSTEMS      Review of Systems     Positives and Pertinent negatives as per HPI.     PAST HISTORY     Past Medical History:  Past Medical History:   Diagnosis Date    Bartholin cyst     Infectious disease 2012    MRSA in left and right axillary       Past Surgical History:  Past Surgical History:   Procedure Laterality Date    OTHER SURGICAL HISTORY Left 2019    Elbow       Family History:  Family History   Problem Relation Age of Onset    Psoriasis Sister     Bipolar Disorder Brother     Schizophrenia Brother     No Known Problems Mother     Heart Disease Maternal Grandmother     Heart Disease Maternal Grandfather     Heart Disease Maternal Uncle     No Known Problems Father        Social History:  Social History     Tobacco Use    Smoking

## 2023-11-06 NOTE — DISCHARGE INSTRUCTIONS
Range    Clue Cells, Wet Prep CLUE CELLS PRESENT      Trich, Wet Prep NO TRICHOMONAS SEEN     KOH (VAGINAL, RESPIRATORY)    Collection Time: 11/06/23  1:15 PM    Specimen: Vaginal swab   Result Value Ref Range    Special Requests NO SPECIAL REQUESTS      PURA Prep NO YEAST SEEN       The exam and treatment you received in the Emergency Department were for an urgent problem and are not intended as complete care. It is important that you follow up with a doctor, nurse practitioner, or physician assistant for ongoing care. If your symptoms become worse or you do not improve as expected and you are unable to reach your usual health care provider, you should return to the Emergency Department. We are available 24 hours a day. Please take your discharge instructions with you when you go to your follow-up appointment. If a prescription has been provided, please have it filled as soon as possible to prevent a delay in treatment. Read the entire medication instruction sheet provided to you by the pharmacy. If you have any questions or reservations about taking the medication due to side effects or interactions with other medications, please call your primary care physician or contact the ER to speak with the charge nurse. Please make an appointment with your family doctor or the physician you were referred to for follow-up of this visit as instructed on your discharge paperwork. Return to the ER if you are unable to be seen or if you are unable to be seen in a timely manner. Should you experience abdominal pain lasting greater than 6 hours, chest pain, difficulty breathing, fever/chills, numbness/tingling, skin changes or other symptoms that concern you, return to the ED sooner. If you feel worse over the next 24 hours, please return to the ED. We are available 24 hours a day. Thank you for trusting us with your care!

## 2023-11-07 LAB
BACTERIA SPEC CULT: NORMAL
CC UR VC: NORMAL
SERVICE CMNT-IMP: NORMAL

## 2023-11-08 LAB
HSV SPEC CULT: ABNORMAL
SPECIMEN SOURCE: ABNORMAL

## 2023-11-20 ENCOUNTER — TELEPHONE (OUTPATIENT)
Facility: CLINIC | Age: 38
End: 2023-11-20

## 2023-11-20 NOTE — TELEPHONE ENCOUNTER
Spoke to pt verified name and . Pt went to the ER on 23. She was prescribed valtrex and cephalexin by the ER. Pt states for the past 2 weeks she was been having feet and leg swelling. She states her legs/feet are in a lot of pain and she can barley walk on her feet. Pt took her last valtrex today, wants to know if the swelling could be due to her medications?

## 2023-11-20 NOTE — TELEPHONE ENCOUNTER
Patient called stating that her legs feet has been swelling for a week she thinks it comed from her Valtrex

## 2023-11-21 NOTE — TELEPHONE ENCOUNTER
Inform patient: Neither Valtrex or cephalexin cause leg swelling. She should keep legs propped up when she is sitting down and stay on a low-salt diet to get rid of swelling.

## 2023-11-21 NOTE — TELEPHONE ENCOUNTER
Spoke to pt verified name and . Let her know per Dr. Darshana Crandall note the medications prescribed to her do not cause swelling. Advised her to go to ER due to new onset of swelling. Pt understood and had no further questions.

## 2023-11-29 ENCOUNTER — TELEPHONE (OUTPATIENT)
Facility: CLINIC | Age: 38
End: 2023-11-29

## 2023-11-29 DIAGNOSIS — B37.31 VAGINAL YEAST INFECTION: Primary | ICD-10-CM

## 2023-11-29 RX ORDER — FLUCONAZOLE 150 MG/1
150 TABLET ORAL ONCE
Qty: 1 TABLET | Refills: 0 | Status: SHIPPED | OUTPATIENT
Start: 2023-11-29 | End: 2023-11-29

## 2023-12-03 PROBLEM — G62.1 ALCOHOLIC POLYNEUROPATHY (HCC): Status: ACTIVE | Noted: 2023-12-03

## 2023-12-04 ENCOUNTER — NURSE TRIAGE (OUTPATIENT)
Dept: OTHER | Facility: CLINIC | Age: 38
End: 2023-12-04

## 2023-12-04 DIAGNOSIS — G62.9 POLYNEUROPATHY, UNSPECIFIED: ICD-10-CM

## 2023-12-04 RX ORDER — PREGABALIN 75 MG/1
CAPSULE ORAL
Qty: 60 CAPSULE | Refills: 0 | OUTPATIENT
Start: 2023-12-04 | End: 2024-01-04

## 2023-12-04 NOTE — TELEPHONE ENCOUNTER
Received call from [facility], caller not on line. Complaint: worsening yeast infection     Practice Name: Internal Medicine of [address] [place] [address] telephone number verified as [phone]    Connected with caller via phone, please see below triage    After speaking with patient she says her symptoms have improved. She cancelled a routine visit this morning and needs to reschedule. Care advice provided, patient verbalizes understanding; denies any other questions or concerns; instructed to call back for any new or worsening symptoms. Patient/Caller agrees with recommended disposition; writer provided warm transfer to Gurinder Gu at [employer] for appointment scheduling    Attention Provider: Thank you for allowing me to participate in the care of your patient. The patient was connected to triage in response to information provided to the ECC/PSC. Please do not respond through this encounter as the response is not directed to a shared pool. Reason for Disposition   Requesting regular office appointment    Protocols used:  Information Only Call - No Triage-ADULT-

## 2023-12-04 NOTE — TELEPHONE ENCOUNTER
PCP: Yee Johnston MD     Last appt:  5/13/2022      Future Appointments   Date Time Provider Department Center   2/26/2024  8:50 AM Yee Johnston MD [facility] BS AMB          Requested Prescriptions     Pending Prescriptions Disp Refills    pregabalin (LYRICA) 75 MG capsule 60 capsule 0     Sig: TAKE 1 CAPSULE BY MOUTH TWICE DAILY. MAX DAILY AMOUNT: 150 MG

## 2023-12-29 DIAGNOSIS — G62.9 POLYNEUROPATHY, UNSPECIFIED: ICD-10-CM

## 2023-12-29 RX ORDER — PREGABALIN 75 MG/1
CAPSULE ORAL
Qty: 60 CAPSULE | Refills: 0 | OUTPATIENT
Start: 2023-12-29 | End: 2024-01-29

## 2024-01-18 DIAGNOSIS — G62.9 POLYNEUROPATHY, UNSPECIFIED: ICD-10-CM

## 2024-01-18 NOTE — TELEPHONE ENCOUNTER
PCP: Yee Johnston MD     Last appt:  5/13/2022      Future Appointments   Date Time Provider Department Center   2/26/2024  8:50 AM Yee Johnston MD [facility] BS AMB          Requested Prescriptions     Pending Prescriptions Disp Refills    pregabalin (LYRICA) 75 MG capsule 60 capsule 0     Sig: TAKE 1 CAPSULE BY MOUTH TWICE DAILY. MAX DAILY AMOUNT: 150 MG

## 2024-01-20 RX ORDER — PREGABALIN 75 MG/1
CAPSULE ORAL
Qty: 60 CAPSULE | Refills: 0 | OUTPATIENT
Start: 2024-01-20 | End: 2024-02-18

## 2024-01-22 RX ORDER — PREGABALIN 75 MG/1
CAPSULE ORAL
Qty: 60 CAPSULE | Refills: 0 | Status: SHIPPED | OUTPATIENT
Start: 2024-01-22 | End: 2024-02-22

## 2024-01-22 NOTE — TELEPHONE ENCOUNTER
Pt called and stated that she is completely out of the medications and really needs it. Pt stated that she does have an appt in Feb and will be here

## 2024-03-05 ENCOUNTER — OFFICE VISIT (OUTPATIENT)
Facility: CLINIC | Age: 39
End: 2024-03-05
Payer: MEDICAID

## 2024-03-05 VITALS
DIASTOLIC BLOOD PRESSURE: 72 MMHG | TEMPERATURE: 98.6 F | OXYGEN SATURATION: 95 % | BODY MASS INDEX: 22.47 KG/M2 | SYSTOLIC BLOOD PRESSURE: 102 MMHG | HEART RATE: 99 BPM | HEIGHT: 63 IN | RESPIRATION RATE: 16 BRPM | WEIGHT: 126.8 LBS

## 2024-03-05 DIAGNOSIS — F17.210 SMOKES 1 PACK OF CIGARETTES PER DAY: ICD-10-CM

## 2024-03-05 DIAGNOSIS — Z00.00 ANNUAL PHYSICAL EXAM: Primary | ICD-10-CM

## 2024-03-05 DIAGNOSIS — G62.1 ALCOHOLIC POLYNEUROPATHY (HCC): ICD-10-CM

## 2024-03-05 DIAGNOSIS — A60.04 HERPES SIMPLEX VULVOVAGINITIS: ICD-10-CM

## 2024-03-05 DIAGNOSIS — Z13.220 SCREENING, LIPID: ICD-10-CM

## 2024-03-05 DIAGNOSIS — Z11.59 NEED FOR HEPATITIS C SCREENING TEST: ICD-10-CM

## 2024-03-05 DIAGNOSIS — D75.89 MACROCYTOSIS WITHOUT ANEMIA: ICD-10-CM

## 2024-03-05 PROCEDURE — 99395 PREV VISIT EST AGE 18-39: CPT | Performed by: INTERNAL MEDICINE

## 2024-03-05 RX ORDER — VALACYCLOVIR HYDROCHLORIDE 1 G/1
TABLET, FILM COATED ORAL
COMMUNITY
Start: 2024-02-06

## 2024-03-05 RX ORDER — METRONIDAZOLE 7.5 MG/G
GEL VAGINAL
COMMUNITY
Start: 2024-02-06

## 2024-03-05 RX ORDER — FLUCONAZOLE 150 MG/1
TABLET ORAL
COMMUNITY
Start: 2023-11-30 | End: 2024-03-05 | Stop reason: ALTCHOICE

## 2024-03-05 SDOH — ECONOMIC STABILITY: FOOD INSECURITY: WITHIN THE PAST 12 MONTHS, THE FOOD YOU BOUGHT JUST DIDN'T LAST AND YOU DIDN'T HAVE MONEY TO GET MORE.: NEVER TRUE

## 2024-03-05 SDOH — ECONOMIC STABILITY: FOOD INSECURITY: WITHIN THE PAST 12 MONTHS, YOU WORRIED THAT YOUR FOOD WOULD RUN OUT BEFORE YOU GOT MONEY TO BUY MORE.: NEVER TRUE

## 2024-03-05 SDOH — ECONOMIC STABILITY: INCOME INSECURITY: HOW HARD IS IT FOR YOU TO PAY FOR THE VERY BASICS LIKE FOOD, HOUSING, MEDICAL CARE, AND HEATING?: SOMEWHAT HARD

## 2024-03-05 SDOH — ECONOMIC STABILITY: TRANSPORTATION INSECURITY
IN THE PAST 12 MONTHS, HAS LACK OF TRANSPORTATION KEPT YOU FROM MEETINGS, WORK, OR FROM GETTING THINGS NEEDED FOR DAILY LIVING?: NO

## 2024-03-05 SDOH — ECONOMIC STABILITY: HOUSING INSECURITY
IN THE LAST 12 MONTHS, WAS THERE A TIME WHEN YOU DID NOT HAVE A STEADY PLACE TO SLEEP OR SLEPT IN A SHELTER (INCLUDING NOW)?: NO

## 2024-03-05 ASSESSMENT — PATIENT HEALTH QUESTIONNAIRE - PHQ9
SUM OF ALL RESPONSES TO PHQ9 QUESTIONS 1 & 2: 0
SUM OF ALL RESPONSES TO PHQ QUESTIONS 1-9: 0
SUM OF ALL RESPONSES TO PHQ QUESTIONS 1-9: 0
1. LITTLE INTEREST OR PLEASURE IN DOING THINGS: 0
SUM OF ALL RESPONSES TO PHQ QUESTIONS 1-9: 0
2. FEELING DOWN, DEPRESSED OR HOPELESS: 0
SUM OF ALL RESPONSES TO PHQ QUESTIONS 1-9: 0

## 2024-03-05 NOTE — PROGRESS NOTES
Shoshana Diego is a 39 y.o. female    Chief Complaint   Patient presents with    Follow-up     Med refills       /72   Pulse 99   Temp 98.6 °F (37 °C)   Resp 16   Ht 1.6 m (5' 3\")   Wt 57.5 kg (126 lb 12.8 oz)   SpO2 95%   BMI 22.46 kg/m²         1. Have you been to the ER, urgent care clinic since your last visit?  Hospitalized since your last visit? Yes    2. Have you seen or consulted any other health care providers outside of the [facility] since your last visit?  Include any pap smears or colon screening. No    Learning Assessment:       No data to display                Fall Risk Assessment:       No data to display                Abuse Screening:       No data to display                ADL Screening:       No data to display

## 2024-03-05 NOTE — PROGRESS NOTES
Chief Complaint   Patient presents with    Annual Exam       HISTORY OF PRESENT ILLNESS  Shoshana Diego is a 39 y.o. female    Presents for annual exam. Last seen 4/8/22. She has alcoholic neuropathy, hx of alcoholism, depression, anxiety, chronic insomnia, and tobacco use.    Complains of runny nose, nonproductive cough, sinus congestion, and occasional body aches.  Denies fevers, chills, and shortness of breath.  No relief with Sudafed or Mucinex.    Was diagnosed with genital herpes a few months ago. Started on valacyclovir, caused diarrhea. Missed time from work. Another time diagnosed with a genital boil. Prescribed metronidazle vaginal gel (70 g tube) that helped significantly.    Did PT 2 times a week for balance issues related to neuropathy; helped a little. She says Dr. Juarez said she no longer needs to follow regularly with Neurology. Takes Lyrica 75 mg, 2 tabs a night, rather than BID as prescribed.    Denies depressed mood and anxiety. Sleeps well with trazodone 100 mg nightly.     Soc Hx  Single. No children. 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 Smokes 1 ppd since age 13; 26 pack yr hx. Stopped drinking alcohol; last drink on 1/1/24.     Patient Active Problem List   Diagnosis    Macrocytosis without anemia    Hypokalemia    Alcohol abuse    Hypomagnesemia    Folate deficiency    Alcoholic polyneuropathy (HCC)     Past Medical History:   Diagnosis Date    Bartholin cyst     Infectious disease 2012    MRSA in left and right axillary    Neuropathy     Sleep apnea      No Known Allergies    Current Outpatient Medications   Medication Sig Dispense Refill    valACYclovir (VALTREX) 1 g tablet TAKE 1 TABLET BY MOUTH THREE TIMES DAILY FOR 10 DAYS      metroNIDAZOLE (METROGEL) 0.75 % vaginal gel INSERT 1 APPLICATORFUL VAGINALLY ONCE DAILY FOR 5 DAYS.      pregabalin (LYRICA) 75 MG capsule TAKE 1 CAPSULE BY MOUTH TWICE DAILY. MAX DAILY AMOUNT: 150 MG 60 capsule 0    ibuprofen (ADVIL;MOTRIN)

## 2024-03-12 LAB
ALBUMIN SERPL-MCNC: 2.8 G/DL (ref 3.5–5)
ALBUMIN/GLOB SERPL: 0.8 (ref 1.1–2.2)
ALP SERPL-CCNC: 231 U/L (ref 45–117)
ALT SERPL-CCNC: 73 U/L (ref 12–78)
ANION GAP SERPL CALC-SCNC: 7 MMOL/L (ref 5–15)
AST SERPL-CCNC: 176 U/L (ref 15–37)
BASOPHILS # BLD: 0.2 K/UL (ref 0–0.1)
BASOPHILS NFR BLD: 2 % (ref 0–1)
BILIRUB SERPL-MCNC: 1 MG/DL (ref 0.2–1)
BUN SERPL-MCNC: 9 MG/DL (ref 6–20)
BUN/CREAT SERPL: 16 (ref 12–20)
CALCIUM SERPL-MCNC: 8.2 MG/DL (ref 8.5–10.1)
CHLORIDE SERPL-SCNC: 109 MMOL/L (ref 97–108)
CHOLEST SERPL-MCNC: 137 MG/DL
CO2 SERPL-SCNC: 30 MMOL/L (ref 21–32)
CREAT SERPL-MCNC: 0.58 MG/DL (ref 0.55–1.02)
DIFFERENTIAL METHOD BLD: ABNORMAL
EOSINOPHIL # BLD: 0.3 K/UL (ref 0–0.4)
EOSINOPHIL NFR BLD: 3 % (ref 0–7)
ERYTHROCYTE [DISTWIDTH] IN BLOOD BY AUTOMATED COUNT: 25.3 % (ref 11.5–14.5)
GLOBULIN SER CALC-MCNC: 3.6 G/DL (ref 2–4)
GLUCOSE SERPL-MCNC: 98 MG/DL (ref 65–100)
HCT VFR BLD AUTO: 30.8 % (ref 35–47)
HDLC SERPL-MCNC: 37 MG/DL
HDLC SERPL: 3.7 (ref 0–5)
HGB BLD-MCNC: 11 G/DL (ref 11.5–16)
IMM GRANULOCYTES # BLD AUTO: 0 K/UL (ref 0–0.04)
IMM GRANULOCYTES NFR BLD AUTO: 0 % (ref 0–0.5)
LDLC SERPL CALC-MCNC: 80.8 MG/DL (ref 0–100)
LYMPHOCYTES # BLD: 1.9 K/UL (ref 0.8–3.5)
LYMPHOCYTES NFR BLD: 19 % (ref 12–49)
MCH RBC QN AUTO: 39.7 PG (ref 26–34)
MCHC RBC AUTO-ENTMCNC: 35.7 G/DL (ref 30–36.5)
MCV RBC AUTO: 111.2 FL (ref 80–99)
MONOCYTES # BLD: 1.1 K/UL (ref 0–1)
MONOCYTES NFR BLD: 11 % (ref 5–13)
NEUTS SEG # BLD: 6.4 K/UL (ref 1.8–8)
NEUTS SEG NFR BLD: 65 % (ref 32–75)
NRBC # BLD: 0 K/UL (ref 0–0.01)
NRBC BLD-RTO: 0 PER 100 WBC
PLATELET # BLD AUTO: 230 K/UL (ref 150–400)
PMV BLD AUTO: 10.5 FL (ref 8.9–12.9)
POTASSIUM SERPL-SCNC: 3.1 MMOL/L (ref 3.5–5.1)
PROT SERPL-MCNC: 6.4 G/DL (ref 6.4–8.2)
RBC # BLD AUTO: 2.77 M/UL (ref 3.8–5.2)
RBC MORPH BLD: ABNORMAL
SODIUM SERPL-SCNC: 146 MMOL/L (ref 136–145)
TRIGL SERPL-MCNC: 96 MG/DL
VLDLC SERPL CALC-MCNC: 19.2 MG/DL
WBC # BLD AUTO: 9.9 K/UL (ref 3.6–11)

## 2024-03-14 DIAGNOSIS — E87.6 HYPOKALEMIA: Primary | ICD-10-CM

## 2024-03-14 DIAGNOSIS — R74.8 ELEVATED LIVER ENZYMES: ICD-10-CM

## 2024-03-14 DIAGNOSIS — D53.9 MACROCYTIC ANEMIA: ICD-10-CM

## 2024-03-14 LAB
HCV AB SERPL QL IA: NORMAL
HCV IGG SERPL QL IA: NON REACTIVE S/CO RATIO

## 2024-03-14 RX ORDER — POTASSIUM CHLORIDE 20 MEQ/1
20 TABLET, EXTENDED RELEASE ORAL 2 TIMES DAILY
Qty: 14 TABLET | Refills: 0 | Status: SHIPPED | OUTPATIENT
Start: 2024-03-14 | End: 2024-03-21

## 2024-04-01 ENCOUNTER — TELEPHONE (OUTPATIENT)
Facility: CLINIC | Age: 39
End: 2024-04-01

## 2024-04-01 DIAGNOSIS — G62.9 POLYNEUROPATHY, UNSPECIFIED: ICD-10-CM

## 2024-04-01 DIAGNOSIS — B96.89 BV (BACTERIAL VAGINOSIS): Primary | ICD-10-CM

## 2024-04-01 DIAGNOSIS — N76.0 BV (BACTERIAL VAGINOSIS): Primary | ICD-10-CM

## 2024-04-01 RX ORDER — PREGABALIN 75 MG/1
CAPSULE ORAL
Qty: 60 CAPSULE | Refills: 0 | Status: SHIPPED | OUTPATIENT
Start: 2024-04-01 | End: 2024-05-14

## 2024-04-01 RX ORDER — METRONIDAZOLE 7.5 MG/G
GEL VAGINAL
Qty: 75 G | Refills: 0 | Status: SHIPPED | OUTPATIENT
Start: 2024-04-01

## 2024-04-01 NOTE — TELEPHONE ENCOUNTER
Pt would like to speak to nurse regarding blood results, she stated she received a mychart message but did not fully understand it

## 2024-04-01 NOTE — TELEPHONE ENCOUNTER
Pt believes she may be pregnant.       PCP: Yee Johnston MD     Last appt:  3/5/2024    No future appointments.       Requested Prescriptions     Pending Prescriptions Disp Refills    pregabalin (LYRICA) 75 MG capsule 60 capsule 0     Sig: TAKE 1 CAPSULE BY MOUTH TWICE DAILY. MAX DAILY AMOUNT: 150 MG

## 2024-04-26 ENCOUNTER — HOSPITAL ENCOUNTER (OUTPATIENT)
Facility: HOSPITAL | Age: 39
End: 2024-04-26
Payer: MEDICAID

## 2024-04-26 DIAGNOSIS — R74.8 ELEVATED LIVER ENZYMES: ICD-10-CM

## 2024-04-26 PROCEDURE — 76705 ECHO EXAM OF ABDOMEN: CPT

## 2024-04-27 DIAGNOSIS — K70.31 ALCOHOLIC CIRRHOSIS OF LIVER WITH ASCITES (HCC): Primary | ICD-10-CM

## 2024-04-29 ENCOUNTER — TELEPHONE (OUTPATIENT)
Facility: CLINIC | Age: 39
End: 2024-04-29

## 2024-04-29 NOTE — TELEPHONE ENCOUNTER
----- Message from Arabella Dick sent at 4/29/2024  3:49 PM EDT -----  Subject: Message to Provider    QUESTIONS  Information for Provider? [facility] want [facility]   liver function test done before they can see her. they want stomach   drained. can the order be placed. if not they told her to go to ER. please   call patient to let her know what to do.   ---------------------------------------------------------------------------  --------------  CALL BACK INFO  [phone]; OK to leave message on voicemail  ---------------------------------------------------------------------------  --------------  SCRIPT ANSWERS  Relationship to Patient? Self

## 2024-04-30 ENCOUNTER — TELEPHONE (OUTPATIENT)
Facility: CLINIC | Age: 39
End: 2024-04-30

## 2024-04-30 NOTE — TELEPHONE ENCOUNTER
I recommend she go to [facility] or [facility] for ascites and abnormal liver US findings.

## 2024-04-30 NOTE — TELEPHONE ENCOUNTER
Spoke to pt verified name and . She stated she needs to get fluid off her abdomen before being able to go see the [facility]. I recommended she go to the ER to have that done. Pt understood and had no further questions.       Referral faxed with the success it went through.

## 2024-05-01 ENCOUNTER — HOSPITAL ENCOUNTER (INPATIENT)
Facility: HOSPITAL | Age: 39
LOS: 3 days | Discharge: HOME | DRG: 280 | End: 2024-05-05
Attending: EMERGENCY MEDICINE | Admitting: INTERNAL MEDICINE
Payer: MEDICAID

## 2024-05-01 DIAGNOSIS — E72.20 HYPERAMMONEMIA (HCC): ICD-10-CM

## 2024-05-01 DIAGNOSIS — R18.8 ASCITES OF LIVER: ICD-10-CM

## 2024-05-01 DIAGNOSIS — E80.6 HYPERBILIRUBINEMIA: Primary | ICD-10-CM

## 2024-05-01 LAB
ALBUMIN SERPL-MCNC: 2.5 G/DL (ref 3.5–5)
ALBUMIN/GLOB SERPL: 0.5 (ref 1.1–2.2)
ALP SERPL-CCNC: 221 U/L (ref 45–117)
ALT SERPL-CCNC: 42 U/L (ref 12–78)
ANION GAP SERPL CALC-SCNC: 8 MMOL/L (ref 5–15)
AST SERPL-CCNC: 122 U/L (ref 15–37)
BASOPHILS # BLD: 0.1 K/UL (ref 0–0.1)
BASOPHILS NFR BLD: 1 % (ref 0–1)
BILIRUB SERPL-MCNC: 7.9 MG/DL (ref 0.2–1)
BUN SERPL-MCNC: 7 MG/DL (ref 6–20)
BUN/CREAT SERPL: 11 (ref 12–20)
CALCIUM SERPL-MCNC: 8.6 MG/DL (ref 8.5–10.1)
CHLORIDE SERPL-SCNC: 101 MMOL/L (ref 97–108)
CO2 SERPL-SCNC: 24 MMOL/L (ref 21–32)
CREAT SERPL-MCNC: 0.64 MG/DL (ref 0.55–1.02)
DIFFERENTIAL METHOD BLD: ABNORMAL
EOSINOPHIL # BLD: 0 K/UL (ref 0–0.4)
EOSINOPHIL NFR BLD: 0 % (ref 0–7)
ERYTHROCYTE [DISTWIDTH] IN BLOOD BY AUTOMATED COUNT: 22 % (ref 11.5–14.5)
GLOBULIN SER CALC-MCNC: 5.3 G/DL (ref 2–4)
GLUCOSE SERPL-MCNC: 102 MG/DL (ref 65–100)
HCT VFR BLD AUTO: 33.9 % (ref 35–47)
HGB BLD-MCNC: 11.2 G/DL (ref 11.5–16)
IMM GRANULOCYTES # BLD AUTO: 0.1 K/UL (ref 0–0.04)
IMM GRANULOCYTES NFR BLD AUTO: 1 % (ref 0–0.5)
LYMPHOCYTES # BLD: 1 K/UL (ref 0.8–3.5)
LYMPHOCYTES NFR BLD: 9 % (ref 12–49)
MCH RBC QN AUTO: 36.4 PG (ref 26–34)
MCHC RBC AUTO-ENTMCNC: 33 G/DL (ref 30–36.5)
MCV RBC AUTO: 110.1 FL (ref 80–99)
MONOCYTES # BLD: 0.9 K/UL (ref 0–1)
MONOCYTES NFR BLD: 8 % (ref 5–13)
NEUTS SEG # BLD: 8.8 K/UL (ref 1.8–8)
NEUTS SEG NFR BLD: 81 % (ref 32–75)
NRBC # BLD: 0 K/UL (ref 0–0.01)
NRBC BLD-RTO: 0 PER 100 WBC
PLATELET # BLD AUTO: 141 K/UL (ref 150–400)
PMV BLD AUTO: 10.2 FL (ref 8.9–12.9)
POTASSIUM SERPL-SCNC: 4 MMOL/L (ref 3.5–5.1)
PROT SERPL-MCNC: 7.8 G/DL (ref 6.4–8.2)
RBC # BLD AUTO: 3.08 M/UL (ref 3.8–5.2)
RBC MORPH BLD: ABNORMAL
RBC MORPH BLD: ABNORMAL
SODIUM SERPL-SCNC: 133 MMOL/L (ref 136–145)
WBC # BLD AUTO: 10.9 K/UL (ref 3.6–11)

## 2024-05-01 PROCEDURE — 99285 EMERGENCY DEPT VISIT HI MDM: CPT

## 2024-05-01 PROCEDURE — 36415 COLL VENOUS BLD VENIPUNCTURE: CPT

## 2024-05-01 PROCEDURE — 85025 COMPLETE CBC W/AUTO DIFF WBC: CPT

## 2024-05-01 PROCEDURE — 85610 PROTHROMBIN TIME: CPT

## 2024-05-01 PROCEDURE — 80053 COMPREHEN METABOLIC PANEL: CPT

## 2024-05-01 ASSESSMENT — PAIN DESCRIPTION - LOCATION: LOCATION: ABDOMEN

## 2024-05-01 ASSESSMENT — PAIN - FUNCTIONAL ASSESSMENT: PAIN_FUNCTIONAL_ASSESSMENT: 0-10

## 2024-05-01 ASSESSMENT — PAIN SCALES - GENERAL: PAINLEVEL_OUTOF10: 10

## 2024-05-02 ENCOUNTER — APPOINTMENT (OUTPATIENT)
Facility: HOSPITAL | Age: 39
DRG: 280 | End: 2024-05-02
Payer: MEDICAID

## 2024-05-02 PROBLEM — K74.60 DECOMPENSATED HEPATIC CIRRHOSIS (HCC): Status: ACTIVE | Noted: 2024-05-02

## 2024-05-02 PROBLEM — K72.90 DECOMPENSATED HEPATIC CIRRHOSIS (HCC): Status: ACTIVE | Noted: 2024-05-02

## 2024-05-02 LAB
ALBUMIN FLD-MCNC: 0.6 G/DL
AMMONIA PLAS-SCNC: 45 UMOL/L
APPEARANCE FLD: CLEAR
APPEARANCE UR: CLEAR
BACTERIA URNS QL MICRO: ABNORMAL /HPF
BILIRUB UR QL CFM: POSITIVE
COLOR FLD: YELLOW
COLOR UR: ABNORMAL
COMMENT:: NORMAL
EPITH CASTS URNS QL MICRO: ABNORMAL /LPF
GLUCOSE UR STRIP.AUTO-MCNC: NEGATIVE MG/DL
HGB UR QL STRIP: NEGATIVE
INR PPP: 1.4 (ref 0.9–1.1)
KETONES UR QL STRIP.AUTO: ABNORMAL MG/DL
LEUKOCYTE ESTERASE UR QL STRIP.AUTO: ABNORMAL
LYMPHOCYTES NFR FLD: 9 %
MESOTHL CELL NFR FLD: 21 %
MONOS+MACROS NFR FLD: 49 %
NEUTROPHILS NFR FLD: 21 %
NITRITE UR QL STRIP.AUTO: POSITIVE
NUC CELL # FLD: 172 /CU MM
PH UR STRIP: 6.5 (ref 5–8)
PROT FLD-MCNC: 1.3 G/DL
PROT UR STRIP-MCNC: ABNORMAL MG/DL
PROTHROMBIN TIME: 14.4 SEC (ref 9–11.1)
RBC # FLD: 25 /CU MM
RBC #/AREA URNS HPF: ABNORMAL /HPF (ref 0–5)
SP GR UR REFRACTOMETRY: 1.01 (ref 1–1.03)
SPECIMEN HOLD: NORMAL
SPECIMEN SOURCE FLD: ABNORMAL
SPECIMEN SOURCE FLD: NORMAL
SPECIMEN SOURCE FLD: NORMAL
UROBILINOGEN UR QL STRIP.AUTO: 2 EU/DL (ref 0.2–1)
WBC URNS QL MICRO: ABNORMAL /HPF (ref 0–4)

## 2024-05-02 PROCEDURE — 6370000000 HC RX 637 (ALT 250 FOR IP): Performed by: INTERNAL MEDICINE

## 2024-05-02 PROCEDURE — 6360000002 HC RX W HCPCS: Performed by: EMERGENCY MEDICINE

## 2024-05-02 PROCEDURE — 2700000000 HC OXYGEN THERAPY PER DAY

## 2024-05-02 PROCEDURE — 2709999900 US GUIDED PARACENTESIS

## 2024-05-02 PROCEDURE — 6360000004 HC RX CONTRAST MEDICATION: Performed by: RADIOLOGY

## 2024-05-02 PROCEDURE — 88305 TISSUE EXAM BY PATHOLOGIST: CPT

## 2024-05-02 PROCEDURE — 2500000003 HC RX 250 WO HCPCS: Performed by: NURSE PRACTITIONER

## 2024-05-02 PROCEDURE — HZ2ZZZZ DETOXIFICATION SERVICES FOR SUBSTANCE ABUSE TREATMENT: ICD-10-PCS | Performed by: INTERNAL MEDICINE

## 2024-05-02 PROCEDURE — 84157 ASSAY OF PROTEIN OTHER: CPT

## 2024-05-02 PROCEDURE — 87449 NOS EACH ORGANISM AG IA: CPT

## 2024-05-02 PROCEDURE — 82042 OTHER SOURCE ALBUMIN QUAN EA: CPT

## 2024-05-02 PROCEDURE — 82140 ASSAY OF AMMONIA: CPT

## 2024-05-02 PROCEDURE — 96374 THER/PROPH/DIAG INJ IV PUSH: CPT

## 2024-05-02 PROCEDURE — 87070 CULTURE OTHR SPECIMN AEROBIC: CPT

## 2024-05-02 PROCEDURE — 2580000003 HC RX 258: Performed by: INTERNAL MEDICINE

## 2024-05-02 PROCEDURE — 0W9G3ZZ DRAINAGE OF PERITONEAL CAVITY, PERCUTANEOUS APPROACH: ICD-10-PCS | Performed by: RADIOLOGY

## 2024-05-02 PROCEDURE — 81001 URINALYSIS AUTO W/SCOPE: CPT

## 2024-05-02 PROCEDURE — 88112 CYTOPATH CELL ENHANCE TECH: CPT

## 2024-05-02 PROCEDURE — 74177 CT ABD & PELVIS W/CONTRAST: CPT

## 2024-05-02 PROCEDURE — 89050 BODY FLUID CELL COUNT: CPT

## 2024-05-02 PROCEDURE — P9047 ALBUMIN (HUMAN), 25%, 50ML: HCPCS | Performed by: INTERNAL MEDICINE

## 2024-05-02 PROCEDURE — 94760 N-INVAS EAR/PLS OXIMETRY 1: CPT

## 2024-05-02 PROCEDURE — 1100000003 HC PRIVATE W/ TELEMETRY

## 2024-05-02 PROCEDURE — 87205 SMEAR GRAM STAIN: CPT

## 2024-05-02 PROCEDURE — 87506 IADNA-DNA/RNA PROBE TQ 6-11: CPT

## 2024-05-02 PROCEDURE — 36415 COLL VENOUS BLD VENIPUNCTURE: CPT

## 2024-05-02 PROCEDURE — 96376 TX/PRO/DX INJ SAME DRUG ADON: CPT

## 2024-05-02 PROCEDURE — 87324 CLOSTRIDIUM AG IA: CPT

## 2024-05-02 PROCEDURE — 6360000002 HC RX W HCPCS: Performed by: INTERNAL MEDICINE

## 2024-05-02 RX ORDER — ACETAMINOPHEN 650 MG/1
650 SUPPOSITORY RECTAL EVERY 6 HOURS PRN
Status: DISCONTINUED | OUTPATIENT
Start: 2024-05-02 | End: 2024-05-05 | Stop reason: HOSPADM

## 2024-05-02 RX ORDER — SODIUM CHLORIDE 0.9 % (FLUSH) 0.9 %
5-40 SYRINGE (ML) INJECTION EVERY 12 HOURS SCHEDULED
Status: DISCONTINUED | OUTPATIENT
Start: 2024-05-02 | End: 2024-05-05 | Stop reason: HOSPADM

## 2024-05-02 RX ORDER — SODIUM CHLORIDE 0.9 % (FLUSH) 0.9 %
5-40 SYRINGE (ML) INJECTION PRN
Status: DISCONTINUED | OUTPATIENT
Start: 2024-05-02 | End: 2024-05-03 | Stop reason: SDUPTHER

## 2024-05-02 RX ORDER — FENTANYL CITRATE 50 UG/ML
50 INJECTION, SOLUTION INTRAMUSCULAR; INTRAVENOUS
Status: COMPLETED | OUTPATIENT
Start: 2024-05-02 | End: 2024-05-02

## 2024-05-02 RX ORDER — SODIUM CHLORIDE 0.9 % (FLUSH) 0.9 %
5-40 SYRINGE (ML) INJECTION EVERY 12 HOURS SCHEDULED
Status: DISCONTINUED | OUTPATIENT
Start: 2024-05-02 | End: 2024-05-02 | Stop reason: SDUPTHER

## 2024-05-02 RX ORDER — SODIUM CHLORIDE 9 MG/ML
INJECTION, SOLUTION INTRAVENOUS PRN
Status: DISCONTINUED | OUTPATIENT
Start: 2024-05-02 | End: 2024-05-05 | Stop reason: HOSPADM

## 2024-05-02 RX ORDER — ACETAMINOPHEN 325 MG/1
650 TABLET ORAL EVERY 6 HOURS PRN
Status: DISCONTINUED | OUTPATIENT
Start: 2024-05-02 | End: 2024-05-02

## 2024-05-02 RX ORDER — ONDANSETRON 4 MG/1
4 TABLET, ORALLY DISINTEGRATING ORAL EVERY 8 HOURS PRN
Status: DISCONTINUED | OUTPATIENT
Start: 2024-05-02 | End: 2024-05-05 | Stop reason: HOSPADM

## 2024-05-02 RX ORDER — LIDOCAINE HYDROCHLORIDE 10 MG/ML
10 INJECTION, SOLUTION EPIDURAL; INFILTRATION; INTRACAUDAL; PERINEURAL ONCE
Status: COMPLETED | OUTPATIENT
Start: 2024-05-02 | End: 2024-05-02

## 2024-05-02 RX ORDER — SODIUM CHLORIDE 0.9 % (FLUSH) 0.9 %
5-40 SYRINGE (ML) INJECTION EVERY 12 HOURS SCHEDULED
Status: DISCONTINUED | OUTPATIENT
Start: 2024-05-02 | End: 2024-05-03 | Stop reason: SDUPTHER

## 2024-05-02 RX ORDER — LORAZEPAM 2 MG/ML
1 INJECTION INTRAMUSCULAR
Status: DISCONTINUED | OUTPATIENT
Start: 2024-05-02 | End: 2024-05-05 | Stop reason: HOSPADM

## 2024-05-02 RX ORDER — ACETAMINOPHEN 325 MG/1
650 TABLET ORAL EVERY 6 HOURS PRN
Status: DISCONTINUED | OUTPATIENT
Start: 2024-05-02 | End: 2024-05-05 | Stop reason: HOSPADM

## 2024-05-02 RX ORDER — SPIRONOLACTONE 25 MG/1
50 TABLET ORAL DAILY
Status: DISCONTINUED | OUTPATIENT
Start: 2024-05-03 | End: 2024-05-03

## 2024-05-02 RX ORDER — ALBUMIN (HUMAN) 12.5 G/50ML
25 SOLUTION INTRAVENOUS ONCE
Status: COMPLETED | OUTPATIENT
Start: 2024-05-02 | End: 2024-05-02

## 2024-05-02 RX ORDER — ONDANSETRON 2 MG/ML
4 INJECTION INTRAMUSCULAR; INTRAVENOUS EVERY 6 HOURS PRN
Status: DISCONTINUED | OUTPATIENT
Start: 2024-05-02 | End: 2024-05-05 | Stop reason: HOSPADM

## 2024-05-02 RX ORDER — LORAZEPAM 1 MG/1
3 TABLET ORAL
Status: DISCONTINUED | OUTPATIENT
Start: 2024-05-02 | End: 2024-05-05 | Stop reason: HOSPADM

## 2024-05-02 RX ORDER — ONDANSETRON 2 MG/ML
4 INJECTION INTRAMUSCULAR; INTRAVENOUS EVERY 4 HOURS PRN
Status: DISCONTINUED | OUTPATIENT
Start: 2024-05-02 | End: 2024-05-02

## 2024-05-02 RX ORDER — POLYETHYLENE GLYCOL 3350 17 G/17G
17 POWDER, FOR SOLUTION ORAL DAILY PRN
Status: DISCONTINUED | OUTPATIENT
Start: 2024-05-02 | End: 2024-05-05 | Stop reason: HOSPADM

## 2024-05-02 RX ORDER — GAUZE BANDAGE 2" X 2"
100 BANDAGE TOPICAL DAILY
Status: DISCONTINUED | OUTPATIENT
Start: 2024-05-02 | End: 2024-05-05 | Stop reason: HOSPADM

## 2024-05-02 RX ORDER — TRAZODONE HYDROCHLORIDE 100 MG/1
100 TABLET ORAL NIGHTLY
Status: DISCONTINUED | OUTPATIENT
Start: 2024-05-02 | End: 2024-05-05 | Stop reason: HOSPADM

## 2024-05-02 RX ORDER — LORAZEPAM 2 MG/ML
4 INJECTION INTRAMUSCULAR
Status: DISCONTINUED | OUTPATIENT
Start: 2024-05-02 | End: 2024-05-05 | Stop reason: HOSPADM

## 2024-05-02 RX ORDER — LORAZEPAM 1 MG/1
2 TABLET ORAL
Status: DISCONTINUED | OUTPATIENT
Start: 2024-05-02 | End: 2024-05-05 | Stop reason: HOSPADM

## 2024-05-02 RX ORDER — FUROSEMIDE 20 MG/1
20 TABLET ORAL DAILY
Status: DISCONTINUED | OUTPATIENT
Start: 2024-05-02 | End: 2024-05-05 | Stop reason: HOSPADM

## 2024-05-02 RX ORDER — SODIUM CHLORIDE 0.9 % (FLUSH) 0.9 %
5-40 SYRINGE (ML) INJECTION PRN
Status: DISCONTINUED | OUTPATIENT
Start: 2024-05-02 | End: 2024-05-05 | Stop reason: HOSPADM

## 2024-05-02 RX ORDER — SODIUM CHLORIDE 9 MG/ML
INJECTION, SOLUTION INTRAVENOUS PRN
Status: DISCONTINUED | OUTPATIENT
Start: 2024-05-02 | End: 2024-05-02 | Stop reason: SDUPTHER

## 2024-05-02 RX ORDER — LORAZEPAM 2 MG/ML
3 INJECTION INTRAMUSCULAR
Status: DISCONTINUED | OUTPATIENT
Start: 2024-05-02 | End: 2024-05-05 | Stop reason: HOSPADM

## 2024-05-02 RX ORDER — LACTULOSE 10 G/15ML
20 SOLUTION ORAL DAILY
Status: DISCONTINUED | OUTPATIENT
Start: 2024-05-02 | End: 2024-05-03

## 2024-05-02 RX ORDER — ENOXAPARIN SODIUM 100 MG/ML
40 INJECTION SUBCUTANEOUS DAILY
Status: DISCONTINUED | OUTPATIENT
Start: 2024-05-03 | End: 2024-05-05 | Stop reason: HOSPADM

## 2024-05-02 RX ORDER — PREGABALIN 75 MG/1
75 CAPSULE ORAL 2 TIMES DAILY
Status: DISCONTINUED | OUTPATIENT
Start: 2024-05-02 | End: 2024-05-05 | Stop reason: HOSPADM

## 2024-05-02 RX ORDER — LORAZEPAM 1 MG/1
4 TABLET ORAL
Status: DISCONTINUED | OUTPATIENT
Start: 2024-05-02 | End: 2024-05-05 | Stop reason: HOSPADM

## 2024-05-02 RX ORDER — FOLIC ACID 1 MG/1
1 TABLET ORAL DAILY
Status: DISCONTINUED | OUTPATIENT
Start: 2024-05-02 | End: 2024-05-05 | Stop reason: HOSPADM

## 2024-05-02 RX ORDER — LORAZEPAM 2 MG/ML
2 INJECTION INTRAMUSCULAR
Status: DISCONTINUED | OUTPATIENT
Start: 2024-05-02 | End: 2024-05-05 | Stop reason: HOSPADM

## 2024-05-02 RX ORDER — SPIRONOLACTONE 25 MG/1
12.5 TABLET ORAL DAILY
Status: DISCONTINUED | OUTPATIENT
Start: 2024-05-02 | End: 2024-05-02

## 2024-05-02 RX ORDER — FENTANYL CITRATE 50 UG/ML
25 INJECTION, SOLUTION INTRAMUSCULAR; INTRAVENOUS
Status: COMPLETED | OUTPATIENT
Start: 2024-05-02 | End: 2024-05-02

## 2024-05-02 RX ORDER — SODIUM CHLORIDE 9 MG/ML
INJECTION, SOLUTION INTRAVENOUS PRN
Status: DISCONTINUED | OUTPATIENT
Start: 2024-05-02 | End: 2024-05-03 | Stop reason: SDUPTHER

## 2024-05-02 RX ORDER — LORAZEPAM 1 MG/1
1 TABLET ORAL
Status: DISCONTINUED | OUTPATIENT
Start: 2024-05-02 | End: 2024-05-05 | Stop reason: HOSPADM

## 2024-05-02 RX ADMIN — ACETAMINOPHEN 650 MG: 325 TABLET ORAL at 21:08

## 2024-05-02 RX ADMIN — LORAZEPAM 1 MG: 1 TABLET ORAL at 21:08

## 2024-05-02 RX ADMIN — IOPAMIDOL 100 ML: 755 INJECTION, SOLUTION INTRAVENOUS at 00:36

## 2024-05-02 RX ADMIN — Medication 100 MG: at 18:26

## 2024-05-02 RX ADMIN — ALBUMIN (HUMAN) 25 G: 0.25 INJECTION, SOLUTION INTRAVENOUS at 18:30

## 2024-05-02 RX ADMIN — PREGABALIN 75 MG: 75 CAPSULE ORAL at 21:08

## 2024-05-02 RX ADMIN — LIDOCAINE HYDROCHLORIDE 10 ML: 10 INJECTION, SOLUTION EPIDURAL; INFILTRATION; INTRACAUDAL; PERINEURAL at 16:27

## 2024-05-02 RX ADMIN — SODIUM CHLORIDE, PRESERVATIVE FREE 10 ML: 5 INJECTION INTRAVENOUS at 21:08

## 2024-05-02 RX ADMIN — SODIUM CHLORIDE, PRESERVATIVE FREE 10 ML: 5 INJECTION INTRAVENOUS at 21:09

## 2024-05-02 RX ADMIN — FENTANYL CITRATE 25 MCG: 50 INJECTION INTRAMUSCULAR; INTRAVENOUS at 00:53

## 2024-05-02 RX ADMIN — FOLIC ACID 1 MG: 1 TABLET ORAL at 18:26

## 2024-05-02 RX ADMIN — FENTANYL CITRATE 50 MCG: 50 INJECTION INTRAMUSCULAR; INTRAVENOUS at 03:40

## 2024-05-02 RX ADMIN — TRAZODONE HYDROCHLORIDE 100 MG: 100 TABLET ORAL at 21:08

## 2024-05-02 ASSESSMENT — PAIN SCALES - GENERAL
PAINLEVEL_OUTOF10: 7
PAINLEVEL_OUTOF10: 7

## 2024-05-02 ASSESSMENT — PAIN DESCRIPTION - LOCATION
LOCATION: ABDOMEN
LOCATION: ABDOMEN

## 2024-05-02 NOTE — CONSULTS
The patient was seen and examined independently by me. I have discussed the case with the mid-level provider in detail. I have reviewed the patient's chart and the note.  I personally performed all components of the history, physical, and medical decision making and agree with the assessment and plan, with minor modifications as noted. Please see APPs note for full details.     Patient Vitals for the past 8 hrs:   BP Temp Temp src Pulse Resp SpO2   05/02/24 1002 107/75 99.5 °F (37.5 °C) -- 93 19 94 %   05/02/24 0830 -- -- -- 90 -- 93 %   05/02/24 0715 99/71 98.2 °F (36.8 °C) Oral 90 15 91 %   05/02/24 0657 100/74 -- -- 98 20 93 %   05/02/24 0545 104/70 -- -- 95 14 93 %   05/02/24 0530 96/68 -- -- 94 13 92 %   05/02/24 0523 102/69 -- -- 96 16 93 %   05/02/24 0352 -- -- -- 94 13 93 %   05/02/24 0349 -- -- -- 100 13 (!) 86 %   05/02/24 0345 (!) 94/56 98 °F (36.7 °C) Oral 100 14 90 %   05/02/24 0315 108/81 -- -- 98 17 --   05/02/24 0302 114/79 -- -- 100 18 --   05/02/24 0245 93/61 -- -- 95 18 --   05/02/24 0234 94/64 -- -- 96 18 --     Physical exam:  General:AAO x 3, icteric  HEENT:  EOMI,   Chest:  CTA,Heart: S1, S2, RRR  GI: Soft, ascites, moderate NT, + bowel sounds  Extremities: + edema    Data Review:  reviewed     CTAP:    1. Cirrhosis. Moderate amount of ascites. Patent main portal vein.  2. Diffuse colonic wall thickening likely representing hepatic colopathy.  3. Bilateral lower lung airspace opacities may represent aspiration, infection,  or atelectasis\  US  1. Cirrhosis of the liver with suspicion of partial thrombosis at least of the  portal vein, small amount of flow partially visualized is hepatofugal.  2. Ascites.  3. Gallbladder polyp.     Latest Reference Range & Units 05/01/24 22:25 05/02/24 00:19   Albumin 3.5 - 5.0 g/dL 2.5 (L)    Globulin 2.0 - 4.0 g/dL 5.3 (H)    Albumin/Globulin Ratio 1.1 - 2.2   0.5 (L)    Alkaline Phosphatase 45 - 117 U/L 221 (H)    ALT 12 - 78 U/L 42    Ammonia <32 UMOL/L  
tablet Take 1 tablet by mouth 2 times daily for 7 days    valACYclovir (VALTREX) 1 g tablet TAKE 1 TABLET BY MOUTH THREE TIMES DAILY FOR 10 DAYS (Patient not taking: Reported on 5/2/2024)    ibuprofen (ADVIL;MOTRIN) 800 MG tablet Take 1 tablet by mouth every 8 hours as needed    levonorgestrel (MIRENA) IUD 52 mg 1 each by IntraUTERine route once    traZODone (DESYREL) 100 MG tablet Take 1 tablet by mouth nightly       Review of Systems: Per HPI, otherwise negative.      Objective:     Physical Exam:  Vitals:    05/02/24 0830   BP:    Pulse: 90   Resp:    Temp:    SpO2: 93%     SpO2 Readings from Last 6 Encounters:   05/02/24 93%   03/05/24 95%   11/06/23 96%        No intake or output data in the 24 hours ending 05/02/24 0921   General: appears uncomfortable   Skin:  No rash or palpable dermatologic mass lesions  HEENT: Pupils equal, scleral icterus  Cardiovascular: No abnormal audible heart sounds, well perfused, bilateral edema   Respiratory:  No abnormal audible breath sounds, normal respiratory effort, no throacic deformity  GI:  Abdomen soft, no mass, ascites, diffuse TTP, no rebound or guarding.  Musculoskeletal:  No skeletal deformity nor acute arthritis noted.  Neurological:  Motor and sensory function intact in upper extremities. No asterixis.   Psychiatric:  Normal affect, memory intact, appears to have insight into current illness    Laboratory:    Recent Results (from the past 24 hour(s))   Comprehensive Metabolic Panel    Collection Time: 05/01/24 10:25 PM   Result Value Ref Range    Sodium 133 (L) 136 - 145 mmol/L    Potassium 4.0 3.5 - 5.1 mmol/L    Chloride 101 97 - 108 mmol/L    CO2 24 21 - 32 mmol/L    Anion Gap 8 5 - 15 mmol/L    Glucose 102 (H) 65 - 100 mg/dL    BUN 7 6 - 20 MG/DL    Creatinine 0.64 0.55 - 1.02 MG/DL    Bun/Cre Ratio 11 (L) 12 - 20      Est, Glom Filt Rate >90 >60 ml/min/1.73m2    Calcium 8.6 8.5 - 10.1 MG/DL    Total Bilirubin 7.9 (H) 0.2 - 1.0 MG/DL    ALT 42 12 - 78 U/L    AST

## 2024-05-02 NOTE — H&P
Hospitalist Admission Note    NAME:   Shoshana Diego   : 1985   MRN: [id number]     Date/Time: 2024 4:40 PM    Patient PCP: Yee Johnston MD    ______________________________________________________________________  Given the patient's current clinical presentation, I have a high level of concern for decompensation if discharged from the emergency department.  Complex decision making was performed, which includes reviewing the patient's available past medical records, laboratory results, and x-ray films.       My assessment of this patient's clinical condition and my plan of care is as follows.    Assessment / Plan:    Acute decompensated cirrhosis of the liver with ascites  Acute transaminitis due to cirrhosis of the liver  -Consult IR for paracentesis.  Check ascitic fluid for cell count, SAG  -Start Lasix and Aldactone and monitor renal function and also blood pressure daily.  -Consult gastroenterology  -She was counseled regarding alcohol cessation    Chronic alcoholism with concern for alcohol withdrawal  -Start alcohol withdrawal protocol with Ativan.  Start thiamine and folic acid.  She was counseled regarding alcohol cessation    Neuropathy  -Continue Lyrica    Hyperammonemia likely due to cirrhosis  -Ammonia level is 45.  Will give lactulose and recheck ammonia level in a.m.                  Medical Decision Making:   I personally reviewed labs: CBC, BMP  I personally reviewed imaging: CT abdomen  I personally reviewed EKG:  Toxic drug monitoring:   Discussed case with: ED provider. After discussion I am in agreement that acuity of patient's medical condition necessitates hospital stay.      Code Status: Full code  DVT Prophylaxis: Lovenox  Baseline: From home, independent of ADLs    Subjective:   CHIEF COMPLAINT:     HISTORY OF PRESENT ILLNESS:     Shoshana Diego is a 39 y.o.  female with PMHx significant for abdominal distention.  She has a history of alcohol induced cirrhosis is coming with

## 2024-05-02 NOTE — ED PROVIDER NOTES
EMERGENCY DEPARTMENT HISTORY AND PHYSICAL EXAM     ----------------------------------------------------------------------------  Please note that this dictation was completed with Direct Access Software, the computer voice recognition software.  Quite often unanticipated grammatical, syntax, homophones, and other interpretive errors are inadvertently transcribed by the computer software.  Please disregard these errors.  Please excuse any errors that have escaped final proofreading  ----------------------------------------------------------------------------      Date: 5/1/2024  Patient Name: Shoshana Diego      HISTORY OF PRESENT ILLNESS     Chief Complaint   Patient presents with    Bloated     Patient's PCP referred patient to ED for paracentesis        History obtainted from:  Patient    Other independent source of history: Family    HPI: Shoshana Diego is a 39 y.o. female, with significant pmhx of alcohol induced liver dysfunction, who presents via private vehicle to the ED with report that she was referred to the emergency department by her primary care doctor for paracentesis.  Patient reports she has been followed by her primary care doctor with outpatient referral to hepatology but has been having ongoing abdominal discomfort and back pain that is worse when lying flat due to the increased volume in her abdomen.  Reports having recent outpatient ultrasound performed noting ascites.  Patient reports that she \"needed to get the fluid off so they could do the chest for her liver.\"  Patient notes she has not been scheduled as an outpatient for paracentesis by SARAH AMARO.  Has never had paracentesis in the past.  Notes that her belly started becoming distended several months ago.  Initially that she was pregnant but notes having IUD and a partner with vasectomy.  Subsequently followed up with her primary care doctor and reports having cessation of alcohol consumption in January of this year.      PCP: Yee Johnston MD    Allergy List:

## 2024-05-02 NOTE — ED TRIAGE NOTES
Patient wheeled into triage from waiting room per her PCP, Dr. Johnston, for paracentesis. Patient reports bloating and abdominal pain along with nausea and vomiting.

## 2024-05-02 NOTE — PROGRESS NOTES
1515: Patient arrived via patient bed to [facility] recovery area. Patient is A&Ox4 on RA with 88% O2 sats. Patient placed on 2L NC at this time. Code status and allergies verified with patient prior to procedure.     Name of Procedure: Paracentesis      Start time: 1530  End time: 1600    Vital Signs:  VSS throughout     Fluids Removed: 3500 ml clear yellow fluid      Samples sent to lab: ordered samples sent      Any complications related to procedure: none identified at this time     Patient is A&Ox4, on 2L NC, and is in NAD at this time.     TRANSFER - OUT REPORT:    Verbal report given to LORENZO Anna on Shoshana Diego  being transferred to [facility] for routine post-op       Report consisted of patient's Situation, Background, Assessment and   Recommendations(SBAR).     Information from the following report(s) Nurse Handoff Report was reviewed with the receiving nurse.           Lines:   Peripheral IV 05/01/24 Left;Posterior Hand (Active)   Site Assessment Clean, dry & intact 05/02/24 1245   Line Status Capped;Flushed 05/02/24 1245   Line Care Connections checked and tightened 05/02/24 1245   Phlebitis Assessment No symptoms 05/02/24 1245   Infiltration Assessment 0 05/02/24 1245   Alcohol Cap Used No 05/02/24 1245   Dressing Status Clean, dry & intact 05/02/24 1245   Dressing Type Transparent 05/02/24 1245   Dressing Intervention New 05/01/24 2228       Peripheral IV 05/01/24 Posterior;Right Forearm (Active)   Site Assessment Clean, dry & intact 05/02/24 1245   Line Status Capped;Flushed 05/02/24 1245   Line Care Connections checked and tightened 05/02/24 1245   Phlebitis Assessment No symptoms 05/02/24 1245   Infiltration Assessment 0 05/02/24 1245   Alcohol Cap Used No 05/02/24 1245   Dressing Status Clean, dry & intact 05/02/24 1245   Dressing Type Transparent 05/02/24 1245   Dressing Intervention New 05/01/24 9817        Opportunity for questions and clarification was provided.      Patient transported with:  O2 @ 2lpm

## 2024-05-02 NOTE — ED NOTES
Bedside and Verbal shift change report given to LORENZO Lizarraga (oncoming nurse) by Jackie VENTURA (offgoing nurse). Report included the following information Nurse Handoff Report, ED Encounter Summary, ED SBAR, Adult Overview, MAR, Recent Results, Cardiac Rhythm NSR, and Neuro Assessment.

## 2024-05-03 LAB
ALBUMIN SERPL-MCNC: 2.2 G/DL (ref 3.5–5)
ALBUMIN/GLOB SERPL: 0.6 (ref 1.1–2.2)
ALP SERPL-CCNC: 141 U/L (ref 45–117)
ALT SERPL-CCNC: 26 U/L (ref 12–78)
AMMONIA PLAS-SCNC: 64 UMOL/L
ANION GAP SERPL CALC-SCNC: 9 MMOL/L (ref 5–15)
AST SERPL-CCNC: 57 U/L (ref 15–37)
BASOPHILS # BLD: 0.1 K/UL (ref 0–0.1)
BASOPHILS NFR BLD: 1 % (ref 0–1)
BILIRUB SERPL-MCNC: 5.4 MG/DL (ref 0.2–1)
BUN SERPL-MCNC: 10 MG/DL (ref 6–20)
BUN/CREAT SERPL: 23 (ref 12–20)
C COLI+JEJUNI TUF STL QL NAA+PROBE: NEGATIVE
C DIFF GDH STL QL: NEGATIVE
C DIFF TOX A+B STL QL IA: NEGATIVE
C DIFF TOXIN INTERPRETATION: NORMAL
CALCIUM SERPL-MCNC: 7.9 MG/DL (ref 8.5–10.1)
CHLORIDE SERPL-SCNC: 101 MMOL/L (ref 97–108)
CO2 SERPL-SCNC: 24 MMOL/L (ref 21–32)
CREAT SERPL-MCNC: 0.44 MG/DL (ref 0.55–1.02)
DIFFERENTIAL METHOD BLD: ABNORMAL
EC STX1+STX2 GENES STL QL NAA+PROBE: NEGATIVE
EOSINOPHIL # BLD: 0.2 K/UL (ref 0–0.4)
EOSINOPHIL NFR BLD: 3 % (ref 0–7)
ERYTHROCYTE [DISTWIDTH] IN BLOOD BY AUTOMATED COUNT: 21.3 % (ref 11.5–14.5)
ETEC ELTA+ESTB GENES STL QL NAA+PROBE: NEGATIVE
GLOBULIN SER CALC-MCNC: 3.5 G/DL (ref 2–4)
GLUCOSE SERPL-MCNC: 78 MG/DL (ref 65–100)
HBV SURFACE AB SER QL: NONREACTIVE
HBV SURFACE AB SER-ACNC: <3.1 MIU/ML
HBV SURFACE AG SER QL: <0.1 INDEX
HBV SURFACE AG SER QL: NEGATIVE
HCT VFR BLD AUTO: 26.5 % (ref 35–47)
HGB BLD-MCNC: 9 G/DL (ref 11.5–16)
IMM GRANULOCYTES # BLD AUTO: 0.1 K/UL (ref 0–0.04)
IMM GRANULOCYTES NFR BLD AUTO: 1 % (ref 0–0.5)
IRON SATN MFR SERPL: 47 % (ref 20–50)
IRON SERPL-MCNC: 55 UG/DL (ref 35–150)
LYMPHOCYTES # BLD: 1.2 K/UL (ref 0.8–3.5)
LYMPHOCYTES NFR BLD: 15 % (ref 12–49)
MAGNESIUM SERPL-MCNC: 1.3 MG/DL (ref 1.6–2.4)
MCH RBC QN AUTO: 37.2 PG (ref 26–34)
MCHC RBC AUTO-ENTMCNC: 34 G/DL (ref 30–36.5)
MCV RBC AUTO: 109.5 FL (ref 80–99)
MONOCYTES # BLD: 0.7 K/UL (ref 0–1)
MONOCYTES NFR BLD: 9 % (ref 5–13)
NEUTS SEG # BLD: 5.9 K/UL (ref 1.8–8)
NEUTS SEG NFR BLD: 71 % (ref 32–75)
NRBC # BLD: 0 K/UL (ref 0–0.01)
NRBC BLD-RTO: 0 PER 100 WBC
P SHIGELLOIDES DNA STL QL NAA+PROBE: NEGATIVE
PLATELET # BLD AUTO: 111 K/UL (ref 150–400)
PMV BLD AUTO: 9.9 FL (ref 8.9–12.9)
POTASSIUM SERPL-SCNC: 2.8 MMOL/L (ref 3.5–5.1)
PROT SERPL-MCNC: 5.7 G/DL (ref 6.4–8.2)
RBC # BLD AUTO: 2.42 M/UL (ref 3.8–5.2)
RBC MORPH BLD: ABNORMAL
RBC MORPH BLD: ABNORMAL
SALMONELLA SP SPAO STL QL NAA+PROBE: NEGATIVE
SHIGELLA SP+EIEC IPAH STL QL NAA+PROBE: NEGATIVE
SODIUM SERPL-SCNC: 134 MMOL/L (ref 136–145)
TIBC SERPL-MCNC: 117 UG/DL (ref 250–450)
V CHOL+PARA+VUL DNA STL QL NAA+NON-PROBE: NEGATIVE
WBC # BLD AUTO: 8.2 K/UL (ref 3.6–11)
Y ENTEROCOL DNA STL QL NAA+NON-PROBE: NEGATIVE

## 2024-05-03 PROCEDURE — 86381 MITOCHONDRIAL ANTIBODY EACH: CPT

## 2024-05-03 PROCEDURE — 1100000000 HC RM PRIVATE

## 2024-05-03 PROCEDURE — 83540 ASSAY OF IRON: CPT

## 2024-05-03 PROCEDURE — 36415 COLL VENOUS BLD VENIPUNCTURE: CPT

## 2024-05-03 PROCEDURE — 85025 COMPLETE CBC W/AUTO DIFF WBC: CPT

## 2024-05-03 PROCEDURE — 6360000002 HC RX W HCPCS: Performed by: INTERNAL MEDICINE

## 2024-05-03 PROCEDURE — 80053 COMPREHEN METABOLIC PANEL: CPT

## 2024-05-03 PROCEDURE — 86706 HEP B SURFACE ANTIBODY: CPT

## 2024-05-03 PROCEDURE — 86704 HEP B CORE ANTIBODY TOTAL: CPT

## 2024-05-03 PROCEDURE — 82105 ALPHA-FETOPROTEIN SERUM: CPT

## 2024-05-03 PROCEDURE — 86038 ANTINUCLEAR ANTIBODIES: CPT

## 2024-05-03 PROCEDURE — 82390 ASSAY OF CERULOPLASMIN: CPT

## 2024-05-03 PROCEDURE — 86708 HEPATITIS A ANTIBODY: CPT

## 2024-05-03 PROCEDURE — 86709 HEPATITIS A IGM ANTIBODY: CPT

## 2024-05-03 PROCEDURE — 6370000000 HC RX 637 (ALT 250 FOR IP): Performed by: INTERNAL MEDICINE

## 2024-05-03 PROCEDURE — 82103 ALPHA-1-ANTITRYPSIN TOTAL: CPT

## 2024-05-03 PROCEDURE — 83550 IRON BINDING TEST: CPT

## 2024-05-03 PROCEDURE — 2580000003 HC RX 258: Performed by: INTERNAL MEDICINE

## 2024-05-03 PROCEDURE — 83735 ASSAY OF MAGNESIUM: CPT

## 2024-05-03 PROCEDURE — 82140 ASSAY OF AMMONIA: CPT

## 2024-05-03 PROCEDURE — 86015 ACTIN ANTIBODY EACH: CPT

## 2024-05-03 PROCEDURE — 87340 HEPATITIS B SURFACE AG IA: CPT

## 2024-05-03 PROCEDURE — 6370000000 HC RX 637 (ALT 250 FOR IP)

## 2024-05-03 RX ORDER — SPIRONOLACTONE 25 MG/1
25 TABLET ORAL DAILY
Status: DISCONTINUED | OUTPATIENT
Start: 2024-05-04 | End: 2024-05-05 | Stop reason: HOSPADM

## 2024-05-03 RX ORDER — MAGNESIUM SULFATE IN WATER 40 MG/ML
2000 INJECTION, SOLUTION INTRAVENOUS ONCE
Status: COMPLETED | OUTPATIENT
Start: 2024-05-03 | End: 2024-05-03

## 2024-05-03 RX ORDER — LACTULOSE 10 G/15ML
20 SOLUTION ORAL 2 TIMES DAILY
Status: DISCONTINUED | OUTPATIENT
Start: 2024-05-03 | End: 2024-05-05 | Stop reason: HOSPADM

## 2024-05-03 RX ORDER — POTASSIUM CHLORIDE 750 MG/1
40 TABLET, FILM COATED, EXTENDED RELEASE ORAL ONCE
Status: COMPLETED | OUTPATIENT
Start: 2024-05-03 | End: 2024-05-03

## 2024-05-03 RX ADMIN — RIFAXIMIN 550 MG: 550 TABLET ORAL at 20:52

## 2024-05-03 RX ADMIN — SODIUM CHLORIDE, PRESERVATIVE FREE 10 ML: 5 INJECTION INTRAVENOUS at 20:53

## 2024-05-03 RX ADMIN — LACTULOSE 20 G: 10 SOLUTION ORAL at 20:52

## 2024-05-03 RX ADMIN — MAGNESIUM SULFATE HEPTAHYDRATE 2000 MG: 40 INJECTION, SOLUTION INTRAVENOUS at 10:36

## 2024-05-03 RX ADMIN — TRAZODONE HYDROCHLORIDE 100 MG: 100 TABLET ORAL at 20:52

## 2024-05-03 RX ADMIN — PREGABALIN 75 MG: 75 CAPSULE ORAL at 10:18

## 2024-05-03 RX ADMIN — SODIUM CHLORIDE, PRESERVATIVE FREE 10 ML: 5 INJECTION INTRAVENOUS at 10:18

## 2024-05-03 RX ADMIN — PREGABALIN 75 MG: 75 CAPSULE ORAL at 20:53

## 2024-05-03 RX ADMIN — ACETAMINOPHEN 650 MG: 325 TABLET ORAL at 21:21

## 2024-05-03 RX ADMIN — FUROSEMIDE 20 MG: 20 TABLET ORAL at 10:18

## 2024-05-03 RX ADMIN — FOLIC ACID 1 MG: 1 TABLET ORAL at 10:19

## 2024-05-03 RX ADMIN — POTASSIUM CHLORIDE 40 MEQ: 750 TABLET, FILM COATED, EXTENDED RELEASE ORAL at 10:19

## 2024-05-03 RX ADMIN — Medication 100 MG: at 10:19

## 2024-05-03 ASSESSMENT — PAIN DESCRIPTION - DESCRIPTORS: DESCRIPTORS: ACHING;CRAMPING

## 2024-05-03 ASSESSMENT — PAIN SCALES - GENERAL
PAINLEVEL_OUTOF10: 4
PAINLEVEL_OUTOF10: 7
PAINLEVEL_OUTOF10: 7

## 2024-05-03 ASSESSMENT — PAIN - FUNCTIONAL ASSESSMENT: PAIN_FUNCTIONAL_ASSESSMENT: ACTIVITIES ARE NOT PREVENTED

## 2024-05-03 ASSESSMENT — PAIN DESCRIPTION - LOCATION: LOCATION: ABDOMEN

## 2024-05-03 NOTE — PROGRESS NOTES
Comprehensive Nutrition Assessment    Type and Reason for Visit:  Initial, Consult, Patient Education    Nutrition Recommendations/Plan:   Continue 2g Na diet  Add ensure plus high protein BID  Obtain new weight     Malnutrition Assessment:  Malnutrition Status:  At risk for malnutrition (Comment) (05/03/24 1132)      Nutrition Assessment:    Patient medically noted for alcoholic cirrhosis and ascites. Receiving a regular/2g Na diet. Patient reports a decreased appetite due to pain. Hasn't eaten much over the last few days but starting to feel hungry today. No significant weight changes noted on chart review or reported by patient. She reports adding salt to pretty much everything she eats. Discussed recommendations for low Na diet; handouts provided. Discussed importance of nutrition and meeting protein needs as patient reports only eating 1 meal/day at times. Agreeable to adding ensure plus high protein while admitted. Encouraged protein supplements at home as well. No nutrition questions or concerns reported. Patient needs a new weight s/p paracentesis yesterday.     Nutrition Related Findings:    Na 134, K+ 2.8, Mg 1.3, Ammonia 64, BG 78   BM 5/2   Folic Acid, Lasix, Lactulose, Aldactone, Thiamine   Wound Type: None       Current Nutrition Intake & Therapies:          ADULT DIET; Regular; Low Sodium (2 gm)    Anthropometric Measures:  Height: 160 cm (5' 3\")  Ideal Body Weight (IBW): 115 lbs (52 kg)       Current Body Weight: 61.5 kg (135 lb 9.3 oz),   IBW.    Current BMI (kg/m2): 24                          BMI Categories: Normal Weight (BMI 18.5-24.9)    Estimated Daily Nutrient Needs:  Energy Requirements Based On: Formula  Weight Used for Energy Requirements: Current  Energy (kcal/day): 1638 kcals (BMR x 1.3AF)  Weight Used for Protein Requirements: Current  Protein (g/day): 80g (1.3 g/kg bw)  Method Used for Fluid Requirements: 1 ml/kcal  Fluid (ml/day): 1600 mL    Nutrition Diagnosis:   Inadequate

## 2024-05-03 NOTE — CARE COORDINATION
General Advance Care Planning (ACP) Conversation    Date of Conversation: 5/3/2024  Conducted with: Patient with Decision Making Capacity  Other persons present: None    Healthcare Decision Maker: No healthcare decision makers have been documented.  Click here to complete HealthCare Decision Makers including selection of the Healthcare Decision Maker Relationship (ie \"Primary\")   Today we discussed Healthcare Decision Makers. The patient is considering options.    Content/Action Overview:  Has NO ACP documents-Information provided  Reviewed DNR/DNI and patient elects Full Code (Attempt Resuscitation)    Length of Voluntary ACP Conversation in minutes:  <16 minutes (Non-Billable)    QUOC Zarate   Care Manager, [facility]  [phone]

## 2024-05-03 NOTE — PROGRESS NOTES
PCP hospital follow-up transitional care appointment has been scheduled with Dr. Yee Johnston on 5/8/24 1030. [facility] placed [facility] information AVS for patient resource.   Pending patient discharge.  Nargis Ferraro, Care Management Assistant

## 2024-05-03 NOTE — PROGRESS NOTES
I have examined the patient.  I have reviewed the chart and agree with the documentation recorded by the FELICIA, including the assessment, treatment plan, and disposition.Patient seen and examined by me. I personally performed all components of the history, physical, and medical decision making and agree with the assessment and plan with minor modifications as noted.    Exam:  Awake  HEENT AT  Heart: RRR, S1, S2  GI: soft w/ normal bowel sounds. Mild distension      Assessment:   End-stage liver disease with ascites and cirrhosis from long term alcoholism  Abnormal CTAP  Shortness of breath  INR 1.4  Mild thrombocytopenia    CT abdomen /pelvis w cirrhotic appearing liver with splenomegaly and moderate amount of ascites.  There is diffuse diffuse thickening of the colon compatible with portal colopathy and paraesophageal varices.     Plan and discussion:  39-year-old  lady with history of long-term alcoholism and smoking. +  strong family history of alcoholism in her grandparents, father and brothers.  She has a history of alcoholic neuropathy and previously has been told to quit alcohol.  She is s/p LVP ( 3500 ml removed)  with no SBP.  SAAG c/w portal HTN related ascites.  Serologies to r/o other causes of hepatopathy pending.  Lab evaluation reveals a total bilirubin of 5.4 (Better), AST>ALT.  NH3 is 64.  Start low dose lactulose and add xifaxan.  Stool pending for r/o  infectious causes.  Lasix added.Aldactone on hold 2/2 mildly low BP. Ok to add tomorrow.  ETOH avoidance and detox per protocol.  Correction of lytes and vitamins/nutrients as suggested.  As OP she should be seen at hepatology center with referral for transplant evaluation.  OP EGD( I will arrange after D/C over next few weeks)  On call partners will see prn this week end.                   I have reviewed all pertinent chart and provider notes, lab testing and diagnostic studies.   Medical decision making was undertaken by me for this

## 2024-05-03 NOTE — PROGRESS NOTES
Hospitalist Progress Note    NAME:   Shoshana Diego   : 1985   MRN: [id number]     Date/Time: 5/3/2024 3:01 PM  Patient PCP: Yee Johnston MD    Estimated discharge date:   Barriers: Improvement of alcohol withdrawal, blood pressure, abdominal distention, ammonia      Assessment / Plan:      Acute decompensated cirrhosis of the liver with ascites  Acute transaminitis due to cirrhosis of the liver  -S/p paracentesis with removal of 3.4 L of fluid and received IV albumin  -Continue Lasix and Aldactone with holding parameters due to hypotension.  -GI recommendations noted.  GI will arrange for outpatient endoscopy to rule out varices on the recommended referral to hepatology  -She was counseled regarding alcohol cessation     Chronic alcoholism with concern for alcohol withdrawal  -Continue alcohol withdrawal protocol with Ativan.  Continue thiamine and folic acid.  She was counseled regarding alcohol cessation     Neuropathy  -Continue Lyrica     Hyperammonemia likely due to cirrhosis  -Ammonia 60.  Continue lactulose and added rifaximin.    Diarrhea  -Stool studies pending.     Hypokalemia  Hypomagnesemia  -Potassium and magnesium replaced.  Recheck in a.m. tomorrow    Bibasilar atelectasis  -Patient has no shortness of breath.  She has no fever.  Start incentive spirometry.  No leukocytosis as well so less likely pneumonia        Medical Decision Making:   I personally reviewed labs: CBC, BMP  I personally reviewed imaging:   I personally reviewed EKG:  Toxic drug monitoring:   Discussed case with: GI, pharmacy,         Code Status: Full code  DVT Prophylaxis: Lovenox  GI Prophylaxis:    Subjective:     Chief Complaint / Reason for Physician Visit  She reports abdomen is less distended.  No nausea or vomiting.  No chest pain or shortness of breath  Overnight events noted        Objective:     VITALS:   Last 24hrs VS reviewed since prior progress note. Most recent are:  Patient Vitals for

## 2024-05-03 NOTE — PROGRESS NOTES
Patient requesting no information given to Kris Brandie regarding her admission. Her step mom Mirian Diego is allowed information and will give the PIN number [phone] when asked to identify that she is in fact Mirian Diego. If anyone calling cannot give this pin number no information should be given.

## 2024-05-04 LAB
AFP-TM SERPL-MCNC: <1.8 NG/ML (ref 0–6.4)
AMMONIA PLAS-SCNC: 42 UMOL/L
ANA SER QL: NEGATIVE
ANION GAP SERPL CALC-SCNC: 6 MMOL/L (ref 5–15)
BASOPHILS # BLD: 0.1 K/UL (ref 0–0.1)
BASOPHILS NFR BLD: 1 % (ref 0–1)
BUN SERPL-MCNC: 10 MG/DL (ref 6–20)
BUN/CREAT SERPL: 16 (ref 12–20)
CALCIUM SERPL-MCNC: 8.5 MG/DL (ref 8.5–10.1)
CHLORIDE SERPL-SCNC: 100 MMOL/L (ref 97–108)
CO2 SERPL-SCNC: 26 MMOL/L (ref 21–32)
CREAT SERPL-MCNC: 0.61 MG/DL (ref 0.55–1.02)
DIFFERENTIAL METHOD BLD: ABNORMAL
EOSINOPHIL # BLD: 0.4 K/UL (ref 0–0.4)
EOSINOPHIL NFR BLD: 4 % (ref 0–7)
ERYTHROCYTE [DISTWIDTH] IN BLOOD BY AUTOMATED COUNT: 21.6 % (ref 11.5–14.5)
GLUCOSE SERPL-MCNC: 106 MG/DL (ref 65–100)
HAV AB SER QL IA: NEGATIVE
HAV IGM SER QL: NONREACTIVE
HBV CORE AB SERPL QL IA: NEGATIVE
HCT VFR BLD AUTO: 28 % (ref 35–47)
HGB BLD-MCNC: 9.4 G/DL (ref 11.5–16)
IMM GRANULOCYTES # BLD AUTO: 0 K/UL (ref 0–0.04)
IMM GRANULOCYTES NFR BLD AUTO: 0 % (ref 0–0.5)
LYMPHOCYTES # BLD: 1.8 K/UL (ref 0.8–3.5)
LYMPHOCYTES NFR BLD: 18 % (ref 12–49)
MAGNESIUM SERPL-MCNC: 1.8 MG/DL (ref 1.6–2.4)
MCH RBC QN AUTO: 36.2 PG (ref 26–34)
MCHC RBC AUTO-ENTMCNC: 33.6 G/DL (ref 30–36.5)
MCV RBC AUTO: 107.7 FL (ref 80–99)
MONOCYTES # BLD: 0.8 K/UL (ref 0–1)
MONOCYTES NFR BLD: 8 % (ref 5–13)
NEUTS SEG # BLD: 6.8 K/UL (ref 1.8–8)
NEUTS SEG NFR BLD: 69 % (ref 32–75)
NRBC # BLD: 0 K/UL (ref 0–0.01)
NRBC BLD-RTO: 0 PER 100 WBC
PLATELET # BLD AUTO: 150 K/UL (ref 150–400)
PMV BLD AUTO: 10.1 FL (ref 8.9–12.9)
POTASSIUM SERPL-SCNC: 2.9 MMOL/L (ref 3.5–5.1)
RBC # BLD AUTO: 2.6 M/UL (ref 3.8–5.2)
SODIUM SERPL-SCNC: 132 MMOL/L (ref 136–145)
WBC # BLD AUTO: 10 K/UL (ref 3.6–11)

## 2024-05-04 PROCEDURE — 36415 COLL VENOUS BLD VENIPUNCTURE: CPT

## 2024-05-04 PROCEDURE — 83735 ASSAY OF MAGNESIUM: CPT

## 2024-05-04 PROCEDURE — P9047 ALBUMIN (HUMAN), 25%, 50ML: HCPCS | Performed by: INTERNAL MEDICINE

## 2024-05-04 PROCEDURE — 85025 COMPLETE CBC W/AUTO DIFF WBC: CPT

## 2024-05-04 PROCEDURE — 2580000003 HC RX 258: Performed by: INTERNAL MEDICINE

## 2024-05-04 PROCEDURE — 6360000002 HC RX W HCPCS: Performed by: INTERNAL MEDICINE

## 2024-05-04 PROCEDURE — 80048 BASIC METABOLIC PNL TOTAL CA: CPT

## 2024-05-04 PROCEDURE — 6370000000 HC RX 637 (ALT 250 FOR IP)

## 2024-05-04 PROCEDURE — 1100000000 HC RM PRIVATE

## 2024-05-04 PROCEDURE — 82140 ASSAY OF AMMONIA: CPT

## 2024-05-04 PROCEDURE — 6370000000 HC RX 637 (ALT 250 FOR IP): Performed by: INTERNAL MEDICINE

## 2024-05-04 RX ORDER — MIDODRINE HYDROCHLORIDE 5 MG/1
10 TABLET ORAL 3 TIMES DAILY PRN
Status: DISCONTINUED | OUTPATIENT
Start: 2024-05-04 | End: 2024-05-05 | Stop reason: HOSPADM

## 2024-05-04 RX ORDER — POTASSIUM CHLORIDE 750 MG/1
20 TABLET, FILM COATED, EXTENDED RELEASE ORAL ONCE
Status: COMPLETED | OUTPATIENT
Start: 2024-05-04 | End: 2024-05-04

## 2024-05-04 RX ORDER — LANOLIN ALCOHOL/MO/W.PET/CERES
400 CREAM (GRAM) TOPICAL DAILY
Status: DISCONTINUED | OUTPATIENT
Start: 2024-05-04 | End: 2024-05-05 | Stop reason: HOSPADM

## 2024-05-04 RX ORDER — POTASSIUM CHLORIDE 750 MG/1
40 TABLET, FILM COATED, EXTENDED RELEASE ORAL ONCE
Status: COMPLETED | OUTPATIENT
Start: 2024-05-04 | End: 2024-05-04

## 2024-05-04 RX ORDER — MIDODRINE HYDROCHLORIDE 5 MG/1
10 TABLET ORAL ONCE
Status: COMPLETED | OUTPATIENT
Start: 2024-05-04 | End: 2024-05-04

## 2024-05-04 RX ORDER — TRAMADOL HYDROCHLORIDE 50 MG/1
50 TABLET ORAL EVERY 6 HOURS PRN
Status: DISCONTINUED | OUTPATIENT
Start: 2024-05-04 | End: 2024-05-05 | Stop reason: HOSPADM

## 2024-05-04 RX ORDER — ALBUMIN (HUMAN) 12.5 G/50ML
25 SOLUTION INTRAVENOUS ONCE
Status: COMPLETED | OUTPATIENT
Start: 2024-05-04 | End: 2024-05-04

## 2024-05-04 RX ORDER — ALPRAZOLAM 0.5 MG/1
0.5 TABLET ORAL NIGHTLY PRN
Status: DISCONTINUED | OUTPATIENT
Start: 2024-05-04 | End: 2024-05-05 | Stop reason: HOSPADM

## 2024-05-04 RX ADMIN — PREGABALIN 75 MG: 75 CAPSULE ORAL at 11:46

## 2024-05-04 RX ADMIN — SODIUM CHLORIDE, PRESERVATIVE FREE 10 ML: 5 INJECTION INTRAVENOUS at 11:47

## 2024-05-04 RX ADMIN — LACTULOSE 20 G: 10 SOLUTION ORAL at 20:11

## 2024-05-04 RX ADMIN — PREGABALIN 75 MG: 75 CAPSULE ORAL at 20:11

## 2024-05-04 RX ADMIN — Medication 100 MG: at 11:45

## 2024-05-04 RX ADMIN — ALPRAZOLAM 0.5 MG: 0.5 TABLET ORAL at 18:29

## 2024-05-04 RX ADMIN — RIFAXIMIN 550 MG: 550 TABLET ORAL at 20:11

## 2024-05-04 RX ADMIN — MIDODRINE HYDROCHLORIDE 10 MG: 5 TABLET ORAL at 11:45

## 2024-05-04 RX ADMIN — ALBUMIN (HUMAN) 25 G: 0.25 INJECTION, SOLUTION INTRAVENOUS at 12:02

## 2024-05-04 RX ADMIN — RIFAXIMIN 550 MG: 550 TABLET ORAL at 11:44

## 2024-05-04 RX ADMIN — TRAZODONE HYDROCHLORIDE 100 MG: 100 TABLET ORAL at 20:12

## 2024-05-04 RX ADMIN — POTASSIUM CHLORIDE 20 MEQ: 750 TABLET, FILM COATED, EXTENDED RELEASE ORAL at 13:08

## 2024-05-04 RX ADMIN — POTASSIUM CHLORIDE 40 MEQ: 750 TABLET, FILM COATED, EXTENDED RELEASE ORAL at 11:44

## 2024-05-04 RX ADMIN — LACTULOSE 20 G: 10 SOLUTION ORAL at 11:44

## 2024-05-04 RX ADMIN — ENOXAPARIN SODIUM 40 MG: 100 INJECTION SUBCUTANEOUS at 11:44

## 2024-05-04 RX ADMIN — Medication 400 MG: at 11:53

## 2024-05-04 RX ADMIN — FOLIC ACID 1 MG: 1 TABLET ORAL at 11:45

## 2024-05-04 RX ADMIN — SODIUM CHLORIDE, PRESERVATIVE FREE 10 ML: 5 INJECTION INTRAVENOUS at 20:12

## 2024-05-04 ASSESSMENT — PAIN SCALES - GENERAL: PAINLEVEL_OUTOF10: 0

## 2024-05-04 NOTE — PROGRESS NOTES
Bedside and Verbal shift change report given to SOFIE RN (oncoming nurse) by Zenobia RN (offgoing nurse). Report included the following information Nurse Handoff Report, Recent Results, Cardiac Rhythm Sinus Tachy , Quality Measures, and Neuro Assessment.       Pt had an anxiety attack at 1845, Pt stated she has been trying to be strong\" I talked to pt and MD provided Xanax for anxiety Pt at bedside shift report feels better. No complaints of care. Care is still progressing     PT had 6 bowel movements today

## 2024-05-04 NOTE — PLAN OF CARE
Problem: Discharge Planning  Goal: Discharge to home or other facility with appropriate resources  Outcome: Progressing  Flowsheets (Taken 5/4/2024 1205)  Discharge to home or other facility with appropriate resources: Identify barriers to discharge with patient and caregiver     Problem: Pain  Goal: Verbalizes/displays adequate comfort level or baseline comfort level  Outcome: Progressing     Problem: Safety - Adult  Goal: Free from fall injury  Outcome: Progressing     Problem: Nutrition Deficit:  Goal: Optimize nutritional status  Outcome: Progressing

## 2024-05-04 NOTE — PLAN OF CARE
Problem: Discharge Planning  Goal: Discharge to home or other facility with appropriate resources  5/3/2024 2310 by Kimberli Brown RN  Outcome: Progressing  5/3/2024 1759 by Sandhya Bhagat RN  Outcome: Progressing     Problem: Pain  Goal: Verbalizes/displays adequate comfort level or baseline comfort level  5/3/2024 2310 by Kimberli Brown RN  Outcome: Progressing  5/3/2024 1759 by Sandhya Bhagat RN  Outcome: Progressing     Problem: Safety - Adult  Goal: Free from fall injury  5/3/2024 2310 by Kimberli Brown RN  Outcome: Progressing  5/3/2024 1759 by Sandhya Bhagat RN  Outcome: Progressing     Problem: Nutrition Deficit:  Goal: Optimize nutritional status  5/3/2024 2310 by Kimberli Brown RN  Outcome: Progressing  5/3/2024 1759 by Sandhya Bhagat RN  Outcome: Progressing

## 2024-05-04 NOTE — PROGRESS NOTES
Hospitalist Progress Note    NAME:   Shoshana Diego   : 1985   MRN: [id number]     Date/Time: 2024 1:58 PM  Patient PCP: Yee Johnston MD    Estimated discharge date:   Barriers: Improvement of alcohol withdrawal, blood pressure, abdominal distention, ammonia      Assessment / Plan:      Acute decompensated cirrhosis of the liver with ascites  Acute transaminitis due to cirrhosis of the liver  -S/p paracentesis with removal of 3.4 L of fluid and received IV albumin  -Lasix and Aldactone were started but will have to be held due to borderline low blood pressure.  Resume when appropriate.  -GI recommendations noted.  GI will arrange for outpatient endoscopy to rule out varices on the recommended referral to hepatology  -She was counseled regarding alcohol cessation    Hypotension borderline  -Likely due to combination of cirrhosis and also being on Lasix and Aldactone  -Will give midodrine.  Will start as needed midodrine.  Will also give albumin.  Hold Lasix and Aldactone until blood pressure is better.     Chronic alcoholism with concern for alcohol withdrawal  -Continue alcohol withdrawal protocol with Ativan.  Continue thiamine and folic acid.  She was counseled regarding alcohol cessation     Neuropathy  -Continue Lyrica     Hyperammonemia likely due to cirrhosis  -Continue lactulose.  Recheck ammonia in a.m.    Diarrhea  -Stool studies pending.     Hypokalemia  Hypomagnesemia  -Potassium and magnesium replaced today.  Recheck in a.m.    Bibasilar atelectasis  -Patient has no shortness of breath.  She has no fever.  Start incentive spirometry.  No leukocytosis as well so less likely pneumonia        Medical Decision Making:   I personally reviewed labs: CBC, BMP  I personally reviewed imaging:   I personally reviewed EKG:  Toxic drug monitoring:   Discussed case with: GI, pharmacy,         Code Status: Full code  DVT Prophylaxis: Lovenox  GI Prophylaxis:    Subjective:     Chief

## 2024-05-05 VITALS
OXYGEN SATURATION: 93 % | WEIGHT: 126.32 LBS | BODY MASS INDEX: 22.38 KG/M2 | HEART RATE: 97 BPM | RESPIRATION RATE: 17 BRPM | TEMPERATURE: 98.2 F | SYSTOLIC BLOOD PRESSURE: 95 MMHG | DIASTOLIC BLOOD PRESSURE: 63 MMHG | HEIGHT: 63 IN

## 2024-05-05 LAB
A1AT SERPL-MCNC: 172 MG/DL (ref 100–188)
ALBUMIN SERPL-MCNC: 2.5 G/DL (ref 3.5–5)
ALBUMIN/GLOB SERPL: 0.6 (ref 1.1–2.2)
ALP SERPL-CCNC: 182 U/L (ref 45–117)
ALT SERPL-CCNC: 30 U/L (ref 12–78)
AMMONIA PLAS-SCNC: 38 UMOL/L
ANION GAP SERPL CALC-SCNC: 5 MMOL/L (ref 5–15)
AST SERPL-CCNC: 73 U/L (ref 15–37)
BASOPHILS # BLD: 0.1 K/UL (ref 0–0.1)
BASOPHILS NFR BLD: 1 % (ref 0–1)
BILIRUB DIRECT SERPL-MCNC: 2.4 MG/DL (ref 0–0.2)
BILIRUB SERPL-MCNC: 3.9 MG/DL (ref 0.2–1)
BUN SERPL-MCNC: 8 MG/DL (ref 6–20)
BUN/CREAT SERPL: 15 (ref 12–20)
CALCIUM SERPL-MCNC: 8.6 MG/DL (ref 8.5–10.1)
CERULOPLASMIN SERPL-MCNC: 18.9 MG/DL (ref 19–39)
CHLORIDE SERPL-SCNC: 102 MMOL/L (ref 97–108)
CO2 SERPL-SCNC: 26 MMOL/L (ref 21–32)
CREAT SERPL-MCNC: 0.53 MG/DL (ref 0.55–1.02)
DIFFERENTIAL METHOD BLD: ABNORMAL
EOSINOPHIL # BLD: 0.3 K/UL (ref 0–0.4)
EOSINOPHIL NFR BLD: 3 % (ref 0–7)
ERYTHROCYTE [DISTWIDTH] IN BLOOD BY AUTOMATED COUNT: 21.7 % (ref 11.5–14.5)
GLOBULIN SER CALC-MCNC: 4 G/DL (ref 2–4)
GLUCOSE SERPL-MCNC: 103 MG/DL (ref 65–100)
HCT VFR BLD AUTO: 29.2 % (ref 35–47)
HGB BLD-MCNC: 9.7 G/DL (ref 11.5–16)
IMM GRANULOCYTES # BLD AUTO: 0 K/UL (ref 0–0.04)
IMM GRANULOCYTES NFR BLD AUTO: 0 % (ref 0–0.5)
INR PPP: 1.5 (ref 0.9–1.1)
LYMPHOCYTES # BLD: 1.5 K/UL (ref 0.8–3.5)
LYMPHOCYTES NFR BLD: 17 % (ref 12–49)
MCH RBC QN AUTO: 35.9 PG (ref 26–34)
MCHC RBC AUTO-ENTMCNC: 33.2 G/DL (ref 30–36.5)
MCV RBC AUTO: 108.1 FL (ref 80–99)
MITOCHONDRIA M2 IGG SER-ACNC: <20 UNITS (ref 0–20)
MONOCYTES # BLD: 1 K/UL (ref 0–1)
MONOCYTES NFR BLD: 11 % (ref 5–13)
NEUTS SEG # BLD: 6.1 K/UL (ref 1.8–8)
NEUTS SEG NFR BLD: 68 % (ref 32–75)
NRBC # BLD: 0.02 K/UL (ref 0–0.01)
NRBC BLD-RTO: 0.2 PER 100 WBC
PLATELET # BLD AUTO: 136 K/UL (ref 150–400)
PMV BLD AUTO: 10.1 FL (ref 8.9–12.9)
POTASSIUM SERPL-SCNC: 3.5 MMOL/L (ref 3.5–5.1)
PROT SERPL-MCNC: 6.5 G/DL (ref 6.4–8.2)
PROTHROMBIN TIME: 15.1 SEC (ref 9–11.1)
RBC # BLD AUTO: 2.7 M/UL (ref 3.8–5.2)
RBC MORPH BLD: ABNORMAL
RBC MORPH BLD: ABNORMAL
SMA IGG SER-ACNC: 9 UNITS (ref 0–19)
SODIUM SERPL-SCNC: 133 MMOL/L (ref 136–145)
WBC # BLD AUTO: 9 K/UL (ref 3.6–11)

## 2024-05-05 PROCEDURE — 82140 ASSAY OF AMMONIA: CPT

## 2024-05-05 PROCEDURE — 85610 PROTHROMBIN TIME: CPT

## 2024-05-05 PROCEDURE — 6370000000 HC RX 637 (ALT 250 FOR IP)

## 2024-05-05 PROCEDURE — 6370000000 HC RX 637 (ALT 250 FOR IP): Performed by: INTERNAL MEDICINE

## 2024-05-05 PROCEDURE — 2580000003 HC RX 258: Performed by: INTERNAL MEDICINE

## 2024-05-05 PROCEDURE — 36415 COLL VENOUS BLD VENIPUNCTURE: CPT

## 2024-05-05 PROCEDURE — 80076 HEPATIC FUNCTION PANEL: CPT

## 2024-05-05 PROCEDURE — 6360000002 HC RX W HCPCS: Performed by: INTERNAL MEDICINE

## 2024-05-05 PROCEDURE — 80048 BASIC METABOLIC PNL TOTAL CA: CPT

## 2024-05-05 PROCEDURE — 85025 COMPLETE CBC W/AUTO DIFF WBC: CPT

## 2024-05-05 RX ORDER — LACTULOSE 10 G/15ML
20 SOLUTION ORAL 2 TIMES DAILY
Qty: 1800 ML | Refills: 0 | Status: SHIPPED | OUTPATIENT
Start: 2024-05-05 | End: 2024-06-04

## 2024-05-05 RX ORDER — FOLIC ACID 1 MG/1
1 TABLET ORAL DAILY
Qty: 30 TABLET | Refills: 1 | Status: SHIPPED | OUTPATIENT
Start: 2024-05-06

## 2024-05-05 RX ORDER — THIAMINE MONONITRATE (VIT B1) 100 MG
100 TABLET ORAL DAILY
Qty: 30 TABLET | Refills: 1 | Status: SHIPPED | OUTPATIENT
Start: 2024-05-06

## 2024-05-05 RX ADMIN — Medication 400 MG: at 09:25

## 2024-05-05 RX ADMIN — ENOXAPARIN SODIUM 40 MG: 100 INJECTION SUBCUTANEOUS at 09:24

## 2024-05-05 RX ADMIN — SODIUM CHLORIDE, PRESERVATIVE FREE 10 ML: 5 INJECTION INTRAVENOUS at 09:25

## 2024-05-05 RX ADMIN — FOLIC ACID 1 MG: 1 TABLET ORAL at 09:24

## 2024-05-05 RX ADMIN — PREGABALIN 75 MG: 75 CAPSULE ORAL at 09:24

## 2024-05-05 RX ADMIN — LACTULOSE 20 G: 10 SOLUTION ORAL at 09:25

## 2024-05-05 RX ADMIN — Medication 100 MG: at 09:25

## 2024-05-05 RX ADMIN — ACETAMINOPHEN 650 MG: 325 TABLET ORAL at 09:25

## 2024-05-05 RX ADMIN — RIFAXIMIN 550 MG: 550 TABLET ORAL at 09:24

## 2024-05-05 ASSESSMENT — PAIN SCALES - GENERAL: PAINLEVEL_OUTOF10: 0

## 2024-05-05 NOTE — PROGRESS NOTES
End of Shift Note    Bedside shift change report given to N/A (oncoming nurse) by Isabella Mariee LPN (offgoing nurse).  Report included the following information SBAR, Intake/Output, MAR, and Recent Results    Shift worked:  8873-8982     Shift summary and any significant changes:     Patient slept well overnight, complained of slight back pain. Nurse administered PRN pain meds, patient was satisfied. Patient was administered scheduled Lactulose; no bowel movement during shift. Patient being discharged home; will be transported by her brother.      Concerns for physician to address:  Patient ready to be discharged      Zone phone for oncoming shift:   [phone]       Activity:     Number times ambulated in hallways past shift: 0  Number of times OOB to chair past shift: 1    Cardiac:   Cardiac Monitoring: Not applicable           Access:  Current line(s): PIV     Genitourinary:   Urinary status: voiding    Respiratory:      Chronic home O2 use?: N/A  Incentive spirometer at bedside: N/A       GI:     Current diet:  ADULT DIET; Regular; Low Sodium (2 gm)  ADULT ORAL NUTRITION SUPPLEMENT; Breakfast, Dinner; Standard High Calorie/High Protein Oral Supplement  Passing flatus: YES  Tolerating current diet: YES       Pain Management:   Patient states pain is manageable on current regimen: YES    Skin:     Interventions: increase time out of bed    Patient Safety:  Fall Score:    Interventions: gripper socks       Length of Stay:  Expected LOS: 4  Actual LOS: 3      Isabella Mariee LPN

## 2024-05-05 NOTE — DISCHARGE SUMMARY
KEVIN:    OTHER:            Code status: Full    1. Recommended diet: Low salt diet    2. Recommended activity: activity as tolerated    3. If you experience any of the following symptoms then please call your primary care physician or return to the emergency room if you cannot get hold of your doctor:    4. Wound Care: none    5. Lab work: cbc and cmp in 1 week     6. Stop smocking and alcohol           Follow up with:   PCP : Yee Johnston MD Ess[place], Paulina, MD  [address] [facility] [address] [phone]    Go on 5/8/2024  at 10:30am for your PCP hospital follow up. Please allow 48 hours prior to your appt time/date to reschedule if needed.    [employer]  [address]  [facility] [address] [phone]  Follow up  As needed - [facility] is a mobile [facility] that comes to your home. You may call them if you would like to set up an appt to be seen for a follow up while waiting to be seen by your PCP.    Kamran Feng MD  [address] [facility] [address] [phone]    Schedule an appointment as soon as possible for a visit in 1 week(s)      Tang Rg MD  [address] [facility]  [address]  [facility] [address] [phone]    Schedule an appointment as soon as possible for a visit in 1 week(s)            Total time in minutes spent coordinating this discharge (includes going over instructions, follow-up, prescriptions, and preparing report for sign off to her PCP) :  35 minutes    no

## 2024-05-05 NOTE — PLAN OF CARE
Problem: Discharge Planning  Goal: Discharge to home or other facility with appropriate resources  5/4/2024 2042 by Seema Jean Baptiste RN  Outcome: Progressing  Flowsheets (Taken 5/4/2024 2032)  Discharge to home or other facility with appropriate resources: Identify barriers to discharge with patient and caregiver  5/4/2024 1533 by Zenobia Yanez RN  Outcome: Progressing  Flowsheets (Taken 5/4/2024 1205)  Discharge to home or other facility with appropriate resources: Identify barriers to discharge with patient and caregiver     Problem: Pain  Goal: Verbalizes/displays adequate comfort level or baseline comfort level  5/4/2024 2042 by Seema Jean Baptiste RN  Outcome: Progressing  5/4/2024 1533 by Zenobia Yanez RN  Outcome: Progressing     Problem: Safety - Adult  Goal: Free from fall injury  5/4/2024 2042 by Seema Jean Baptiste RN  Outcome: Progressing  5/4/2024 1533 by Zenobia Yanez RN  Outcome: Progressing     Problem: Nutrition Deficit:  Goal: Optimize nutritional status  5/4/2024 2042 by Seema Jean Baptiste RN  Outcome: Progressing  5/4/2024 1533 by Zenobia Yanez RN  Outcome: Progressing

## 2024-05-05 NOTE — DISCHARGE INSTRUCTIONS
Patient Discharge Instructions    Shoshana Diego / [id number] : 1985    Admitted 2024 Discharged: 2024         DISCHARGE DIAGNOSIS:   Acute decompensated cirrhosis of the liver with ascites  Acute transaminitis due to cirrhosis of the liver  Hypotension borderline  Chronic alcoholism with concern for alcohol withdrawal  Neuropathy  Hyperammonemia likely due to cirrhosis  Diarrhea  Hypokalemia  Hypomagnesemia  Bibasilar atelectasis      Take Home Medications     {Medication reconciliation information is now added to the patient's AVS automatically when it is printed.  There is no need to use this SmartLink in discharge instructions.  Highlight this text and delete it to clear this message}      General drug facts     If you have a very bad allergy, wear an allergy ID at all times.   It is important that you take the medication exactly as they are prescribed.   Keep your medication in the bottles provided by the pharmacist.  Keep a list of all your drugs (prescription, natural products, vitamins, OTC) with you. Give this list to your doctor.  Do not take other medications without consulting your doctor.    Do not share your drugs with others and do not take anyone else's drugs.   Keep all drugs out of the reach of children and pets.    Most drugs may be thrown away in household trash after mixing with coffee grounds or fatoumata litter and sealing in a plastic bag.    Keep a list Call your doctor for help with any side effects. If in the [place] you may also call the FDA at 2-237-FDA-1323    Talk with the doctor before starting any new drug, including OTC, natural products, or vitamins.        What to do at Home    1. Recommended diet: Low salt diet    2. Recommended activity: activity as tolerated    3. If you experience any of the following symptoms then please call your primary care physician or return to the emergency room if you cannot get hold of your doctor:    4. Wound Care: none    5. Lab work: cbc

## 2024-05-06 LAB
BACTERIA SPEC CULT: NORMAL
CYTOLOGY-NON GYN: NORMAL
GRAM STN SPEC: NORMAL
GRAM STN SPEC: NORMAL
SERVICE CMNT-IMP: NORMAL

## 2024-05-07 ENCOUNTER — TELEPHONE (OUTPATIENT)
Age: 39
End: 2024-05-07

## 2024-05-07 NOTE — TELEPHONE ENCOUNTER
Care Transitions Initial Follow Up Call    Outreach made within 2 business days of discharge: Yes    Patient: Shoshana Diego Patient : 1985   MRN: [id number]  Reason for Admission: There are no discharge diagnoses documented for the most recent discharge.  Discharge Date: 24       Spoke with: Patient     Discharge department/facility: [facility]    Scheduled appointment with PCP within 7-14 days    Follow Up  Future Appointments   Date Time Provider Department Center   2024 10:30 AM Yee Johnston MD [facility]

## 2024-05-07 NOTE — TELEPHONE ENCOUNTER
Care Transitions Initial Follow Up Call    Outreach made within 2 business days of discharge: Yes    Patient: Shoshana Diego Patient : 1985   MRN: [id number]  Reason for Admission: There are no discharge diagnoses documented for the most recent discharge.  Discharge Date: 24       Spoke with: Patient    Discharge department/facility: [facility]    TCM Interactive Patient Contact:  Was patient able to fill all prescriptions: No: insurance does not cover antibiotic so the pharmacy sent it back - rifAXIMin   Was patient instructed to bring all medications to the follow-up visit: Yes  Is patient taking all medications as directed in the discharge summary? No - pharmacy issues   Does patient understand their discharge instructions: Yes  Does patient have questions or concerns that need addressed prior to 7-14 day follow up office visit: no    Scheduled appointment with PCP within 7-14 days    Follow Up  Future Appointments   Date Time Provider Department Center   2024 10:30 AM Yee Johnston MD [facility]       Jolynn Grimes MA

## 2024-05-08 ENCOUNTER — CLINICAL DOCUMENTATION (OUTPATIENT)
Age: 39
End: 2024-05-08

## 2024-05-08 ENCOUNTER — OFFICE VISIT (OUTPATIENT)
Facility: CLINIC | Age: 39
End: 2024-05-08
Payer: MEDICAID

## 2024-05-08 VITALS
RESPIRATION RATE: 16 BRPM | HEART RATE: 97 BPM | BODY MASS INDEX: 23.04 KG/M2 | SYSTOLIC BLOOD PRESSURE: 99 MMHG | TEMPERATURE: 98.1 F | HEIGHT: 63 IN | WEIGHT: 130 LBS | OXYGEN SATURATION: 94 % | DIASTOLIC BLOOD PRESSURE: 68 MMHG

## 2024-05-08 DIAGNOSIS — K74.60 DECOMPENSATED HEPATIC CIRRHOSIS (HCC): ICD-10-CM

## 2024-05-08 DIAGNOSIS — E83.42 HYPOMAGNESEMIA: ICD-10-CM

## 2024-05-08 DIAGNOSIS — Z09 HOSPITAL DISCHARGE FOLLOW-UP: Primary | ICD-10-CM

## 2024-05-08 DIAGNOSIS — K72.90 DECOMPENSATED HEPATIC CIRRHOSIS (HCC): ICD-10-CM

## 2024-05-08 DIAGNOSIS — E87.6 HYPOKALEMIA: ICD-10-CM

## 2024-05-08 DIAGNOSIS — E72.20 HYPERAMMONEMIA (HCC): ICD-10-CM

## 2024-05-08 PROCEDURE — 1111F DSCHRG MED/CURRENT MED MERGE: CPT | Performed by: INTERNAL MEDICINE

## 2024-05-08 PROCEDURE — 99214 OFFICE O/P EST MOD 30 MIN: CPT | Performed by: INTERNAL MEDICINE

## 2024-05-08 RX ORDER — IBUPROFEN 800 MG/1
800 TABLET ORAL EVERY 8 HOURS PRN
Qty: 90 TABLET | Refills: 1 | Status: SHIPPED | OUTPATIENT
Start: 2024-05-08

## 2024-05-08 ASSESSMENT — PATIENT HEALTH QUESTIONNAIRE - PHQ9
SUM OF ALL RESPONSES TO PHQ QUESTIONS 1-9: 1
2. FEELING DOWN, DEPRESSED OR HOPELESS: SEVERAL DAYS
SUM OF ALL RESPONSES TO PHQ QUESTIONS 1-9: 1
1. LITTLE INTEREST OR PLEASURE IN DOING THINGS: NOT AT ALL
SUM OF ALL RESPONSES TO PHQ QUESTIONS 1-9: 1
SUM OF ALL RESPONSES TO PHQ QUESTIONS 1-9: 1
SUM OF ALL RESPONSES TO PHQ9 QUESTIONS 1 & 2: 1

## 2024-05-08 NOTE — PROGRESS NOTES
Post-Discharge Transitional Care  Follow Up      Shoshana Diego   YOB: 1985    Date of Office Visit:  5/8/2024  Date of Hospital Admission: 5/1/24  Date of Hospital Discharge: 5/5/24  Risk of hospital readmission (high >=14%. Medium >=10%) :Readmission Risk Score: 13.2      Care management risk score Rising risk (score 2-5) and Complex Care (Scores >=6): No Risk Score On File     Non face to face  following discharge, date last encounter closed (first attempt may have been earlier): 05/07/2024    Call initiated 2 business days of discharge: Yes    ASSESSMENT/PLAN:   Hospital discharge follow-up  -     CO DISCHARGE MEDS RECONCILED W/ CURRENT OUTPATIENT MED LIST    Medical Decision Making: {TCMPN4:85240}  No follow-ups on file.    {Time Documentation Optional:[id number]}       Subjective:   HPI:  Follow up of Hospital problems/diagnosis(es): ***    Inpatient course: Discharge summary reviewed- see chart.    Interval history/Current status: ***    Patient Active Problem List   Diagnosis   • Macrocytosis without anemia   • Hypokalemia   • Alcohol abuse, in remission   • Hypomagnesemia   • Folate deficiency   • Alcoholic polyneuropathy (HCC)   • Decompensated hepatic cirrhosis (HCC)       Medications listed as ordered at the time of discharge from hospital     Medication List          Accurate as of May 8, 2024 10:49 AM. If you have any questions, ask your nurse or doctor.            CONTINUE taking these medications    folic acid 1 MG tablet  Commonly known as: FOLVITE  Take 1 tablet by mouth daily     ibuprofen 800 MG tablet  Commonly known as: ADVIL;MOTRIN     lactulose 10 GM/15ML solution  Commonly known as: CHRONULAC  Take 30 mLs by mouth 2 times daily     levonorgestrel IUD 52 mg  Commonly known as: MIRENA     metroNIDAZOLE 0.75 % vaginal gel  Commonly known as: METROGEL  INSERT 1 APPLICATORFUL VAGINALLY ONCE DAILY FOR 5 DAYS.     pregabalin 75 MG capsule  Commonly known as: LYRICA  TAKE 1 CAPSULE BY 
Shoshana Diego  Identified pt with two pt identifiers(name and ).  Chief Complaint   Patient presents with    Follow-Up from Hospital       1. Have you been to the ER, urgent care clinic since your last visit?  Hospitalized since your last visit? [facility]     2. Have you seen or consulted any other health care providers outside of the [facility] since your last visit?  Include any pap smears or colon screening. NO      Provider notified of reason for visit, vitals and flowsheets obtained on patients.     Patient received paperwork for advance directive during previous visit but has not completed at this time     Reviewed record In preparation for visit, huddled with provider and have obtained necessary documentation      Health Maintenance Due   Topic    Hepatitis A vaccine (1 of 2 - Risk 2-dose series)    Cervical cancer screen        Wt Readings from Last 3 Encounters:   24 59 kg (130 lb)   24 57.3 kg (126 lb 5.2 oz)   24 57.5 kg (126 lb 12.8 oz)     Temp Readings from Last 3 Encounters:   24 98.1 °F (36.7 °C) (Oral)   24 98.2 °F (36.8 °C) (Oral)   24 98.6 °F (37 °C)     BP Readings from Last 3 Encounters:   24 99/68   24 95/63   24 102/72     Pulse Readings from Last 3 Encounters:   24 97   24 97   24 99          No data to display                  Learning Assessment:  :         2024    10:30 AM   [facility] AMB LEARNING ASSESSMENT   Primary Learner Patient   level of education GRADUATED HIGH SCHOOL OR GED   Primary Language ENGLISH   Learning Preference DEMONSTRATION   Answered By Patient   Relationship to Learner SELF       Fall Risk Assessment:  :          No data to display                Abuse Screening:  :          No data to display                ADL Screening:  :         2024    10:00 AM   ADL ASSESSMENT   Feeding yourself No Help Needed   Getting from bed to chair No Help Needed   Getting dressed No Help 
capsule  Commonly known as: LYRICA  TAKE 1 CAPSULE BY MOUTH TWICE DAILY. MAX DAILY AMOUNT: 150 MG. DO NOT TAKE IF PREGNANT     rifAXIMin 550 MG tablet  Commonly known as: XIFAXAN  Take 1 tablet by mouth 2 times daily     traZODone 100 MG tablet  Commonly known as: DESYREL     valACYclovir 1 g tablet  Commonly known as: VALTREX     vitamin B-1 100 MG tablet  Commonly known as: THIAMINE  Take 1 tablet by mouth daily                Medications marked \"taking\" at this time  Outpatient Medications Marked as Taking for the 5/8/24 encounter (Office Visit) with Yee Johnston MD   Medication Sig Dispense Refill    lactulose (CHRONULAC) 10 GM/15ML solution Take 30 mLs by mouth 2 times daily 1800 mL 0    folic acid (FOLVITE) 1 MG tablet Take 1 tablet by mouth daily 30 tablet 1    rifAXIMin (XIFAXAN) 550 MG tablet Take 1 tablet by mouth 2 times daily 42 tablet 1    thiamine mononitrate (THIAMINE) 100 MG tablet Take 1 tablet by mouth daily 30 tablet 1    pregabalin (LYRICA) 75 MG capsule TAKE 1 CAPSULE BY MOUTH TWICE DAILY. MAX DAILY AMOUNT: 150 MG. DO NOT TAKE IF PREGNANT 60 capsule 0    metroNIDAZOLE (METROGEL) 0.75 % vaginal gel INSERT 1 APPLICATORFUL VAGINALLY ONCE DAILY FOR 5 DAYS. 75 g 0    ibuprofen (ADVIL;MOTRIN) 800 MG tablet Take 1 tablet by mouth every 8 hours as needed      levonorgestrel (MIRENA) IUD 52 mg 1 each by IntraUTERine route once      traZODone (DESYREL) 100 MG tablet Take 1 tablet by mouth nightly          Medications patient taking as of now reconciled against medications ordered at time of hospital discharge: Yes    A comprehensive review of systems was negative except for what was noted in the HPI.    Objective:    BP 99/68 (Site: Left Upper Arm, Position: Sitting)   Pulse 97   Temp 98.1 °F (36.7 °C) (Oral)   Resp 16   Ht 1.6 m (5' 3\")   Wt 59 kg (130 lb)   SpO2 94%   BMI 23.03 kg/m²     BP 99/68 (Site: Left Upper Arm, Position: Sitting)   Pulse 97   Temp 98.1 °F (36.7 °C) (Oral)   Resp 16

## 2024-05-08 NOTE — PROGRESS NOTES
Patient left a voicemail stating she was released from hospital and needs a asap appointment per [facility]W  add her on to [facility] for 6.25.24 dx Hyperammonemia

## 2024-05-09 LAB
AMMONIA PLAS-SCNC: 57 UMOL/L
ANION GAP SERPL CALC-SCNC: 8 MMOL/L (ref 5–15)
BUN SERPL-MCNC: 13 MG/DL (ref 6–20)
BUN/CREAT SERPL: 22 (ref 12–20)
CALCIUM SERPL-MCNC: 9 MG/DL (ref 8.5–10.1)
CHLORIDE SERPL-SCNC: 99 MMOL/L (ref 97–108)
CO2 SERPL-SCNC: 26 MMOL/L (ref 21–32)
CREAT SERPL-MCNC: 0.58 MG/DL (ref 0.55–1.02)
GLUCOSE SERPL-MCNC: 90 MG/DL (ref 65–100)
MAGNESIUM SERPL-MCNC: 1.9 MG/DL (ref 1.6–2.4)
POTASSIUM SERPL-SCNC: 3.1 MMOL/L (ref 3.5–5.1)
SODIUM SERPL-SCNC: 133 MMOL/L (ref 136–145)

## 2024-05-13 ENCOUNTER — HOSPITAL ENCOUNTER (OUTPATIENT)
Facility: HOSPITAL | Age: 39
Setting detail: OUTPATIENT SURGERY
Discharge: HOME | End: 2024-05-13
Attending: INTERNAL MEDICINE | Admitting: INTERNAL MEDICINE
Payer: MEDICAID

## 2024-05-13 ENCOUNTER — ANESTHESIA (OUTPATIENT)
Facility: HOSPITAL | Age: 39
End: 2024-05-13
Payer: MEDICAID

## 2024-05-13 ENCOUNTER — ANESTHESIA EVENT (OUTPATIENT)
Facility: HOSPITAL | Age: 39
End: 2024-05-13
Payer: MEDICAID

## 2024-05-13 VITALS
SYSTOLIC BLOOD PRESSURE: 103 MMHG | WEIGHT: 135 LBS | DIASTOLIC BLOOD PRESSURE: 66 MMHG | RESPIRATION RATE: 15 BRPM | HEIGHT: 63 IN | TEMPERATURE: 97.8 F | BODY MASS INDEX: 23.92 KG/M2 | HEART RATE: 98 BPM | OXYGEN SATURATION: 100 %

## 2024-05-13 LAB — HCG UR QL: NEGATIVE

## 2024-05-13 PROCEDURE — 6370000000 HC RX 637 (ALT 250 FOR IP)

## 2024-05-13 PROCEDURE — 2580000003 HC RX 258

## 2024-05-13 PROCEDURE — 2500000003 HC RX 250 WO HCPCS

## 2024-05-13 PROCEDURE — 3700000000 HC ANESTHESIA ATTENDED CARE: Performed by: INTERNAL MEDICINE

## 2024-05-13 PROCEDURE — 3700000001 HC ADD 15 MINUTES (ANESTHESIA): Performed by: INTERNAL MEDICINE

## 2024-05-13 PROCEDURE — 6360000002 HC RX W HCPCS

## 2024-05-13 PROCEDURE — 7100000010 HC PHASE II RECOVERY - FIRST 15 MIN: Performed by: INTERNAL MEDICINE

## 2024-05-13 PROCEDURE — 81025 URINE PREGNANCY TEST: CPT

## 2024-05-13 PROCEDURE — 2709999900 HC NON-CHARGEABLE SUPPLY: Performed by: INTERNAL MEDICINE

## 2024-05-13 PROCEDURE — 3600007512: Performed by: INTERNAL MEDICINE

## 2024-05-13 PROCEDURE — 7100000011 HC PHASE II RECOVERY - ADDTL 15 MIN: Performed by: INTERNAL MEDICINE

## 2024-05-13 PROCEDURE — 3600007502: Performed by: INTERNAL MEDICINE

## 2024-05-13 RX ORDER — LIDOCAINE HYDROCHLORIDE 20 MG/ML
INJECTION, SOLUTION EPIDURAL; INFILTRATION; INTRACAUDAL; PERINEURAL PRN
Status: DISCONTINUED | OUTPATIENT
Start: 2024-05-13 | End: 2024-05-13 | Stop reason: SDUPTHER

## 2024-05-13 RX ORDER — GLYCOPYRROLATE 0.2 MG/ML
INJECTION INTRAMUSCULAR; INTRAVENOUS PRN
Status: DISCONTINUED | OUTPATIENT
Start: 2024-05-13 | End: 2024-05-13 | Stop reason: SDUPTHER

## 2024-05-13 RX ORDER — SODIUM CHLORIDE 0.9 % (FLUSH) 0.9 %
5-40 SYRINGE (ML) INJECTION PRN
Status: DISCONTINUED | OUTPATIENT
Start: 2024-05-13 | End: 2024-05-13 | Stop reason: HOSPADM

## 2024-05-13 RX ORDER — SODIUM CHLORIDE 9 MG/ML
INJECTION, SOLUTION INTRAVENOUS CONTINUOUS PRN
Status: DISCONTINUED | OUTPATIENT
Start: 2024-05-13 | End: 2024-05-13 | Stop reason: SDUPTHER

## 2024-05-13 RX ORDER — SODIUM CHLORIDE 9 MG/ML
INJECTION, SOLUTION INTRAVENOUS CONTINUOUS
Status: DISCONTINUED | OUTPATIENT
Start: 2024-05-13 | End: 2024-05-13 | Stop reason: HOSPADM

## 2024-05-13 RX ADMIN — BENZOCAINE 1 EACH: 200 SPRAY DENTAL; ORAL; PERIODONTAL at 13:37

## 2024-05-13 RX ADMIN — LIDOCAINE HYDROCHLORIDE 100 MG: 20 INJECTION, SOLUTION EPIDURAL; INFILTRATION; INTRACAUDAL; PERINEURAL at 13:37

## 2024-05-13 RX ADMIN — PROPOFOL 100 MG: 10 INJECTION, EMULSION INTRAVENOUS at 13:37

## 2024-05-13 RX ADMIN — PROPOFOL 30 MG: 10 INJECTION, EMULSION INTRAVENOUS at 13:40

## 2024-05-13 RX ADMIN — PROPOFOL 40 MG: 10 INJECTION, EMULSION INTRAVENOUS at 13:38

## 2024-05-13 RX ADMIN — PROPOFOL 30 MG: 10 INJECTION, EMULSION INTRAVENOUS at 13:43

## 2024-05-13 RX ADMIN — PROPOFOL 40 MG: 10 INJECTION, EMULSION INTRAVENOUS at 13:46

## 2024-05-13 RX ADMIN — SODIUM CHLORIDE: 9 INJECTION, SOLUTION INTRAVENOUS at 13:34

## 2024-05-13 RX ADMIN — GLYCOPYRROLATE 0.2 MG: 0.2 INJECTION, SOLUTION INTRAMUSCULAR; INTRAVENOUS at 13:37

## 2024-05-13 ASSESSMENT — PAIN - FUNCTIONAL ASSESSMENT: PAIN_FUNCTIONAL_ASSESSMENT: 0-10

## 2024-05-13 ASSESSMENT — LIFESTYLE VARIABLES: SMOKING_STATUS: 1

## 2024-05-13 NOTE — PROGRESS NOTES
ARRIVAL INFORMATION:  Verified patient name and date of birth, scheduled procedure, and informed consent.     Belongings with patient include:  Clothing,    GI FOCUSED ASSESSMENT:  Neuro: Awake, alert, oriented x4  Respiratory: even and unlabored   GI: soft, distended, tender to touch  EKG Rhythm: normal sinus rhythm    Education:Reviewed general discharge instructions and  information.   The risks and benefits of the bite block have been explained to patient.  Patient verbalizes understanding.

## 2024-05-13 NOTE — PROGRESS NOTES
Endoscopy Case End Note:    Procedure scope was pre-cleaned, per protocol, at bedside by Grace.      Report received from anesthesia.  See anesthesia flowsheet for intra-procedure vital signs and events.    Belongings returned to patient.

## 2024-05-13 NOTE — DISCHARGE INSTRUCTIONS
[facility]  [facility]  Carmel Howell MD, FACG  [phone]     May 13, 2024     Shoshana Diego  YOB: 1985    ENDOSCOPY DISCHARGE INSTRUCTIONS    If there is redness at IV site you should apply warm compress to area.  If redness or soreness persist contact Dr. Howell' or your primary care doctor.    Gaseous discomfort may develop, but walking, belching will help relieve this.  You may not operate a vehicle for 12 hours  You may not operate machinery or dangerous appliances for rest of today  You may not drink alcoholic beverages for 12 hours  Avoid making any critical decisions for 24 hours    DIET:  You may resume your normal diet, but some patients find that heavy or large meals may lead to indigestion or vomiting.  I suggest a light meal as first food intake.    MEDICATIONS:  The use of some over-the-counter pain medication may lead to bleeding after biopsies or other procedures you may have had done.  Tylenol (also called acetaminophen) is safe to take and will not lead to bleeding.      ACTIVITY:  You may resume your normal household activities, but it is recommended that you spend the remainder of the day resting -  avoid any strenuous activity.    CALL DR. Howell' OFFICE IF:  Increasing pain, nausea, vomiting  Abdominal distension (swelling)  Significant new or increased bleeding (oral or rectal)  Fever/Chills  Chest pain/shortness of breath                   Additional instructions:   Impression:   - Small esophageal varices  - Mild portal hypertensive gastropathy           Recommendations:  - Resume diet as tolerated  - Maintain strict alcohol abstinence  - Recall egd in 1 year for variceal screening     It was an honor to be your doctor today.  Please let me or my office staff know if you have any feedback about today's procedure.      Carmel Howell MD, FACG    Patient Education on Sedation / Analgesia Administered

## 2024-05-13 NOTE — ANESTHESIA POSTPROCEDURE EVALUATION
Department of Anesthesiology  Postprocedure Note    Patient: Shoshana Diego  MRN: [id number]  YOB: 1985  Date of evaluation: 5/13/2024    Procedure Summary       Date: 05/13/24 Room / Location: [facility] ENDO 01 / [facility] ENDOSCOPY    Anesthesia Start: 1332 Anesthesia Stop: 1354    Procedure: ESOPHAGOGASTRODUODENOSCOPY Diagnosis:       Generalized abdominal pain      Hepatic encephalopathy (HCC)      Alcoholic cirrhosis of liver with ascites (HCC)      Hypokalemia      (Generalized abdominal pain [R10.84])      (Hepatic encephalopathy (HCC) [K76.82])      (Alcoholic cirrhosis of liver with ascites (HCC) [K70.31])      (Hypokalemia [E87.6])    Surgeons: Ryland Howell MD Responsible Provider: Joseph Colon MD    Anesthesia Type: MAC ASA Status: 3            Anesthesia Type: MAC    Eugenia Phase I: Eugenia Score: 10    Eugenia Phase II: Eugenia Score: 10    Anesthesia Post Evaluation    Patient location during evaluation: bedside  Patient participation: complete - patient participated  Level of consciousness: awake  Pain score: 0  Airway patency: patent  Nausea & Vomiting: no nausea and no vomiting  Cardiovascular status: hemodynamically stable  Respiratory status: acceptable  Hydration status: stable  Pain management: adequate    No notable events documented.

## 2024-05-13 NOTE — OP NOTE
[facility]  [facility]  Carmel Howell MD, [facility]  [phone]       May 13, 2024    Esophagogastroduodenoscopy (EGD) Procedure Note  Shoshana Diego  : 1985  [facility] Medical Record Number: [id number]      Indications:  Decompensated cirrhosis, variceal screening  Referring Physician:  Yee Johnston MD  Anesthesia/Sedation:  Conscious sedation/deep sedation/monitored anesthesia -- see notes.  Endoscopist:  Dr. Carmel Howell  Complications:  None  Estimated Blood Loss:  None    Permit:  The indications, risks, benefits and alternatives were reviewed with the patient or their decision maker who was provided an opportunity to ask questions and all questions were answered.  The specific risks of esophagogastroduodenoscopy with conscious sedation were reviewed, including but not limited to anesthetic complication, bleeding, adverse drug reaction, missed lesion, infection, IV site reactions, and intestinal perforation which would lead to the need for surgical repair.  Alternatives to EGD including radiographic imaging, observation without testing, or laboratory testing were reviewed as well as the limitations of those alternatives discussed.  After considering the options and having all their questions answered, the patient or their decision maker provided both verbal and written consent to proceed.       Procedure in Detail:  After obtaining informed consent, positioning of the patient in the left lateral decubitus position, and conduction of a pre-procedure pause or \"time out\" the endoscope was introduced into the mouth and advanced to the duodenum. Retroflexion was performed at the fundus of the stomach. A careful inspection was made, and findings or interventions are described below.    Findings:   Esophagus: Diaphragmatic impression at 41 cm from the incisors. Z line at 40 cm. Hill valve grade II. Minimal/small sized varices in

## 2024-05-13 NOTE — ANESTHESIA PRE PROCEDURE
Department of Anesthesiology  Preprocedure Note       Name:  Shoshana Diego   Age:  39 y.o.  :  1985                                          MRN:  [id number]         Date:  2024      Surgeon: Surgeon(s):  Ryland Howell MD    Procedure: Procedure(s):  ESOPHAGOGASTRODUODENOSCOPY    Medications prior to admission:   Prior to Admission medications    Medication Sig Start Date End Date Taking? Authorizing Provider   ibuprofen (ADVIL;MOTRIN) 800 MG tablet Take 1 tablet by mouth every 8 hours as needed for Pain 24   Yee Johnston MD   lactulose (CHRONULAC) 10 GM/15ML solution Take 30 mLs by mouth 2 times daily 24  Eladio Darden MD   folic acid (FOLVITE) 1 MG tablet Take 1 tablet by mouth daily 24   Eladio Darden MD   rifAXIMin (XIFAXAN) 550 MG tablet Take 1 tablet by mouth 2 times daily 24   Eladio Darden MD   thiamine mononitrate (THIAMINE) 100 MG tablet Take 1 tablet by mouth daily 24   Eladio Darden MD   pregabalin (LYRICA) 75 MG capsule TAKE 1 CAPSULE BY MOUTH TWICE DAILY. MAX DAILY AMOUNT: 150 MG. DO NOT TAKE IF PREGNANT 24  Yee Johnston MD   metroNIDAZOLE (METROGEL) 0.75 % vaginal gel INSERT 1 APPLICATORFUL VAGINALLY ONCE DAILY FOR 5 DAYS. 24   Yee Johnston MD   levonorgestrel (MIRENA) IUD 52 mg 1 each by IntraUTERine route once    Automatic Reconciliation, Ar   traZODone (DESYREL) 100 MG tablet Take 1 tablet by mouth nightly 21   Automatic Reconciliation, Ar       Current medications:    No current facility-administered medications for this encounter.     Current Outpatient Medications   Medication Sig Dispense Refill   • ibuprofen (ADVIL;MOTRIN) 800 MG tablet Take 1 tablet by mouth every 8 hours as needed for Pain 90 tablet 1   • lactulose (CHRONULAC) 10 GM/15ML solution Take 30 mLs by mouth 2 times daily 1800 mL 0   • folic acid (FOLVITE) 1 MG tablet Take 1 tablet by mouth daily 30 tablet 1   • rifAXIMin (XIFAXAN) 550 MG tablet Take

## 2024-05-13 NOTE — H&P
true   Transportation Needs: No Transportation Needs (5/2/2024)    PRAPARE - Transportation     Lack of Transportation (Medical): No     Lack of Transportation (Non-Medical): No   Housing Stability: Low Risk  (5/2/2024)    Housing Stability Vital Sign     Unable to Pay for Housing in the Last Year: No     Number of Places Lived in the Last Year: 1     Unstable Housing in the Last Year: No      Family History   Problem Relation Age of Onset    Psoriasis Sister     Bipolar Disorder Brother     Schizophrenia Brother     No Known Problems Mother     Heart Disease Maternal Grandmother     Heart Disease Maternal Grandfather     Heart Disease Maternal Uncle     No Known Problems Father        Medications:   Prior to Admission medications    Medication Sig Start Date End Date Taking? Authorizing Provider   ibuprofen (ADVIL;MOTRIN) 800 MG tablet Take 1 tablet by mouth every 8 hours as needed for Pain 5/8/24   Yee Johnston MD   lactulose (CHRONULAC) 10 GM/15ML solution Take 30 mLs by mouth 2 times daily 5/5/24 6/4/24  Eladio Darden MD   folic acid (FOLVITE) 1 MG tablet Take 1 tablet by mouth daily 5/6/24   Eladio Darden MD   rifAXIMin (XIFAXAN) 550 MG tablet Take 1 tablet by mouth 2 times daily 5/5/24   Eladio Darden MD   thiamine mononitrate (THIAMINE) 100 MG tablet Take 1 tablet by mouth daily 5/6/24   Eladio Darden MD   pregabalin (LYRICA) 75 MG capsule TAKE 1 CAPSULE BY MOUTH TWICE DAILY. MAX DAILY AMOUNT: 150 MG. DO NOT TAKE IF PREGNANT 4/1/24 5/14/24  Yee Johnston MD   metroNIDAZOLE (METROGEL) 0.75 % vaginal gel INSERT 1 APPLICATORFUL VAGINALLY ONCE DAILY FOR 5 DAYS. 4/1/24   Yee Johnston MD   levonorgestrel (MIRENA) IUD 52 mg 1 each by IntraUTERine route once    Automatic Reconciliation, Ar   traZODone (DESYREL) 100 MG tablet Take 1 tablet by mouth nightly 6/28/21   Automatic Reconciliation, Ar       Physical Exam:   General: no distress   HEENT: Head: Normocephalic, no lesions, without obvious

## 2024-05-14 ENCOUNTER — TELEPHONE (OUTPATIENT)
Age: 39
End: 2024-05-14

## 2024-05-16 ENCOUNTER — TRANSCRIBE ORDERS (OUTPATIENT)
Facility: HOSPITAL | Age: 39
End: 2024-05-16

## 2024-05-16 DIAGNOSIS — K70.31 ALCOHOLIC CIRRHOSIS OF LIVER WITH ASCITES (HCC): Primary | ICD-10-CM

## 2024-05-21 ENCOUNTER — HOSPITAL ENCOUNTER (OUTPATIENT)
Facility: HOSPITAL | Age: 39
Discharge: HOME | End: 2024-05-24
Payer: MEDICAID

## 2024-05-21 VITALS
WEIGHT: 130 LBS | DIASTOLIC BLOOD PRESSURE: 63 MMHG | TEMPERATURE: 98.5 F | RESPIRATION RATE: 20 BRPM | OXYGEN SATURATION: 92 % | BODY MASS INDEX: 23.04 KG/M2 | HEART RATE: 87 BPM | SYSTOLIC BLOOD PRESSURE: 96 MMHG | HEIGHT: 63 IN

## 2024-05-21 DIAGNOSIS — K70.31 ALCOHOLIC CIRRHOSIS OF LIVER WITH ASCITES (HCC): ICD-10-CM

## 2024-05-21 PROCEDURE — 49083 ABD PARACENTESIS W/IMAGING: CPT

## 2024-05-21 PROCEDURE — 2500000003 HC RX 250 WO HCPCS: Performed by: RADIOLOGY

## 2024-05-21 RX ORDER — LIDOCAINE HYDROCHLORIDE 10 MG/ML
10 INJECTION, SOLUTION EPIDURAL; INFILTRATION; INTRACAUDAL; PERINEURAL ONCE
Status: COMPLETED | OUTPATIENT
Start: 2024-05-21 | End: 2024-05-21

## 2024-05-21 RX ADMIN — LIDOCAINE HYDROCHLORIDE 10 ML: 10 INJECTION, SOLUTION EPIDURAL; INFILTRATION; INTRACAUDAL; PERINEURAL at 08:47

## 2024-05-21 ASSESSMENT — PAIN DESCRIPTION - DESCRIPTORS: DESCRIPTORS: ACHING

## 2024-05-21 ASSESSMENT — PAIN - FUNCTIONAL ASSESSMENT: PAIN_FUNCTIONAL_ASSESSMENT: 0-10

## 2024-05-21 NOTE — PROGRESS NOTES
Total paracentesis output = 2500 cc yellow fluid.  Pt talking with nurse and U/S tech without difficulty.

## 2024-05-21 NOTE — PROGRESS NOTES
I have reviewed discharge instructions with the patient.  The patient verbalized understanding. Copy of discharge instructions per AVS copied, reviewed with pt. And copy to pt. Prior to discharge.  Pt stable without c/o increased pain - pt states \"I have had pain in my stomach with this fluid\" - pt noted with her coat/purse and discharge instructions.  Pt ambulated to car for discharge to home via private vehicle driving herself home.  Pt accomp. By nurse to car for discharge.

## 2024-05-21 NOTE — PROGRESS NOTES
Dr. Leon inserted paracentesis catheter right lower, outer abd. Without difficulty with immed. Return yellow fluid.

## 2024-05-21 NOTE — DISCHARGE INSTRUCTIONS
[facility]  Radiology Department  [phone]    Radiologist:  Dr. Matt Leon    Nurse:  Leigh Rivera RN    Date:  5/21/2024      Paracentesis Discharge Instructions    You may have an aching pain in your abdomen at the puncture site tonight as the numbing medicine wears off.   You may take Tylenol if allowed, as directed on the label.      Resume your previous diet and prescribed medications.      Rest the remainder of today.  If we have removed a large volume of fluid, you could be lightheaded or dizzy when making position changes.      You may shower in 24 hours.  Keep your dressing clean and dry.  You may replace the dressing or band-aid if it becomes moist or soiled.      Watch for signs of infection at the puncture site:  redness, swelling, pus, fever or chills.  Should any of of these occur contact your physician immediately.       If you experience severe sweating and new, severe abdominal pain seek emergency medical treatment.      If any lab samples were sent during your procedure today the resutls will be sent to your Physician.      Follow up with your physician as previously planned.      If you have any questions please call and ask to speak to a radiology nurse.

## 2024-05-23 ENCOUNTER — OFFICE VISIT (OUTPATIENT)
Age: 39
End: 2024-05-23
Payer: MEDICAID

## 2024-05-23 VITALS
OXYGEN SATURATION: 97 % | BODY MASS INDEX: 23.04 KG/M2 | HEIGHT: 63 IN | HEART RATE: 99 BPM | WEIGHT: 130 LBS | SYSTOLIC BLOOD PRESSURE: 100 MMHG | RESPIRATION RATE: 16 BRPM | TEMPERATURE: 97.9 F | DIASTOLIC BLOOD PRESSURE: 66 MMHG

## 2024-05-23 DIAGNOSIS — G62.9 POLYNEUROPATHY, UNSPECIFIED: ICD-10-CM

## 2024-05-23 DIAGNOSIS — R52 PAIN: Primary | ICD-10-CM

## 2024-05-23 DIAGNOSIS — K70.30 ALCOHOLIC CIRRHOSIS OF LIVER WITHOUT ASCITES (HCC): ICD-10-CM

## 2024-05-23 DIAGNOSIS — K70.9 ALCOHOL INDUCED LIVER DISORDER (HCC): ICD-10-CM

## 2024-05-23 LAB
COMMENT:: NORMAL
SPECIMEN HOLD: NORMAL

## 2024-05-23 PROCEDURE — 99204 OFFICE O/P NEW MOD 45 MIN: CPT | Performed by: INTERNAL MEDICINE

## 2024-05-23 RX ORDER — LACTULOSE 10 G/15ML
20 SOLUTION ORAL DAILY
Qty: 900 ML | Refills: 0 | Status: SHIPPED | OUTPATIENT
Start: 2024-05-23 | End: 2024-05-30

## 2024-05-23 RX ORDER — PREGABALIN 75 MG/1
CAPSULE ORAL
Qty: 60 CAPSULE | Refills: 0
Start: 2024-05-23 | End: 2024-06-05 | Stop reason: SDUPTHER

## 2024-05-23 RX ORDER — OXYCODONE HYDROCHLORIDE 5 MG/1
5 TABLET ORAL EVERY 8 HOURS PRN
Qty: 42 TABLET | Refills: 0 | Status: SHIPPED | OUTPATIENT
Start: 2024-05-23 | End: 2024-06-06

## 2024-05-23 RX ORDER — SPIRONOLACTONE 50 MG/1
50 TABLET, FILM COATED ORAL DAILY
COMMUNITY
Start: 2024-05-09 | End: 2024-06-11 | Stop reason: SDUPTHER

## 2024-05-23 RX ORDER — POTASSIUM CHLORIDE 750 MG/1
10 TABLET, FILM COATED, EXTENDED RELEASE ORAL DAILY
COMMUNITY
Start: 2024-05-09 | End: 2024-05-23

## 2024-05-23 ASSESSMENT — PATIENT HEALTH QUESTIONNAIRE - PHQ9
SUM OF ALL RESPONSES TO PHQ9 QUESTIONS 1 & 2: 0
2. FEELING DOWN, DEPRESSED OR HOPELESS: NOT AT ALL
SUM OF ALL RESPONSES TO PHQ QUESTIONS 1-9: 0
1. LITTLE INTEREST OR PLEASURE IN DOING THINGS: NOT AT ALL
SUM OF ALL RESPONSES TO PHQ QUESTIONS 1-9: 0

## 2024-05-23 NOTE — PROGRESS NOTES
Rm    Chief Complaint   Patient presents with    New Patient        /66 (Site: Left Upper Arm)   Pulse 99   Temp 97.9 °F (36.6 °C)   Resp 16   Ht 1.6 m (5' 3\")   Wt 59 kg (130 lb)   SpO2 97%   BMI 23.03 kg/m²      1. Have you been to the ER, urgent care clinic since your last visit?  Hospitalized since your last visit? No     2. Have you seen or consulted any other health care providers outside of the [facility] since your last visit?  Include any pap smears or colon screening. No     Health Maintenance Due   Topic Date Due    Hepatitis A vaccine (1 of 2 - Risk 2-dose series) Never done    Cervical cancer screen  Never done             No data to display                 Failed to redirect to the Timeline version of the Referrizer SmartLink.    Failed to redirect to the Timeline version of the Referrizer SmartLink.             
the lower extremities,   hematemesis,   hematochezia.    The patient has severe limitations in functional activities which can be attributed to the liver disease       ASSESSMENT AND PLAN:  Cirrhosis  The diagnosis of cirrhosis is based upon imaging, laboratory studies, complications of cirrhosis.    Cirrhosis is secondary to alcohol.       The CTP is 9.  Child class B.  The MELD score is 21.    Alcohol associated liver disease  The diagnosis is based upon a history of consuming alcohol in excess, pattern of AST>ALT, serology that is negative for other causes of chronic liver disease.      A liver biopsy has not been performed.    Fibroscan has not been performed.    AST is elevated.  ALT is normal.  ALP is elevated.  Liver function is depressed.  The platelet count is depressed.       The patient has consumed 20 alcoholic beverages daily.  The patient has been abstinent from alcohol since 5/2024.  Discussed the need to remain abstinent from alcohol.    Alcohol use disorder  The patient has an alcohol use disorder that is in remission.   The patient has cirrhosis and has stopped consuming alcohol.    Ascites   Ascites developed for the first time in 5/2024.  Ascites has improved but still present with the current dose of diuretics.  Paracentesis was first performed 5/2024.   Will schedule for another paracentesis.      Will increase the dose of diuretics to step 1.    The patient was counseled regarding the need to maintain sodium restriction and the types of foods containing high amounts of sodium to be avoided.    Lower extremity edema  Edema has resolved with current dose of diuretics.    Screening for Esophageal varices   The patient has not had an EGD to screen for varices.  Will schedule for EGD to assess for varices     Hepatic encephalopathy   The patient has increased forgetfulness, losses track during conversation and may have covert HE.  Will initiate treatment for HE with Lactulose Every day    Anemia

## 2024-05-30 RX ORDER — LACTULOSE 10 G/15ML
SOLUTION ORAL
Qty: 1000 ML | Refills: 5 | Status: SHIPPED | OUTPATIENT
Start: 2024-05-30

## 2024-06-04 DIAGNOSIS — G62.9 POLYNEUROPATHY, UNSPECIFIED: ICD-10-CM

## 2024-06-04 NOTE — TELEPHONE ENCOUNTER
Caller requests Refill of:  pregabalin (LYRICA) 75 MG capsule   traZODone (DESYREL) 100 MG tablet       Please send to:  [id number] [place] [id number] [facility] [phone] [facility] [phone] [[id number]]       Visit Appointment History:  Future Appointments:  Future Appointments   Date Time Provider Department Center   6/25/2024 12:30 PM Tang Rg MD [facility]         Last Appointment With Me:  5/8/2024         Caller confirmed instructions and dosages as correct.    Caller was advised that Meds will be refilled as soon as possible, however there can be a 48-72 business hour turn around on refill requests.

## 2024-06-05 RX ORDER — TRAZODONE HYDROCHLORIDE 100 MG/1
100 TABLET ORAL NIGHTLY
Qty: 30 TABLET | Refills: 3 | Status: SHIPPED | OUTPATIENT
Start: 2024-06-05

## 2024-06-05 RX ORDER — PREGABALIN 75 MG/1
CAPSULE ORAL
Qty: 60 CAPSULE | Refills: 0 | Status: SHIPPED | OUTPATIENT
Start: 2024-06-05 | End: 2024-07-18

## 2024-06-05 NOTE — TELEPHONE ENCOUNTER
PCP: Yee Johnston MD     Last appt:  5/8/2024      Future Appointments   Date Time Provider Department Center   6/25/2024 12:30 PM Tang Rg MD [facility] AM[facility]          Requested Prescriptions     Pending Prescriptions Disp Refills    pregabalin (LYRICA) 75 MG capsule 60 capsule 0     Sig: TAKE 1 CAPSULE BY MOUTH TWICE DAILY. MAX DAILY AMOUNT: 150 MG. DO NOT TAKE IF PREGNANT    traZODone (DESYREL) 100 MG tablet       Sig: Take 1 tablet by mouth nightly

## 2024-06-11 PROBLEM — R18.8 ASCITES: Status: ACTIVE | Noted: 2024-06-11

## 2024-06-11 PROBLEM — K70.9 ALCOHOL INDUCED LIVER DISORDER (HCC): Status: ACTIVE | Noted: 2024-06-11

## 2024-06-11 PROBLEM — K70.9 ALCOHOL INDUCED LIVER DISORDER (HCC): Chronic | Status: ACTIVE | Noted: 2024-06-11

## 2024-06-11 RX ORDER — FUROSEMIDE 40 MG/1
40 TABLET ORAL DAILY
Qty: 90 TABLET | Refills: 3 | Status: SHIPPED | OUTPATIENT
Start: 2024-06-11 | End: 2025-06-11

## 2024-06-11 RX ORDER — SPIRONOLACTONE 100 MG/1
100 TABLET, FILM COATED ORAL DAILY
Qty: 90 TABLET | Refills: 3 | Status: SHIPPED | OUTPATIENT
Start: 2024-06-11

## 2024-06-17 ENCOUNTER — PATIENT MESSAGE (OUTPATIENT)
Age: 39
End: 2024-06-17

## 2024-06-17 DIAGNOSIS — K70.31 ASCITES DUE TO ALCOHOLIC CIRRHOSIS (HCC): Primary | ICD-10-CM

## 2024-06-17 LAB
APPEARANCE UR: ABNORMAL
BACTERIA URNS QL MICRO: ABNORMAL /HPF
BILIRUB UR QL CFM: NEGATIVE
COLOR UR: ABNORMAL
EPITH CASTS URNS QL MICRO: ABNORMAL /LPF
GLUCOSE UR STRIP.AUTO-MCNC: NEGATIVE MG/DL
HGB UR QL STRIP: NEGATIVE
HYALINE CASTS URNS QL MICRO: ABNORMAL /LPF (ref 0–2)
KETONES UR QL STRIP.AUTO: NEGATIVE MG/DL
LEUKOCYTE ESTERASE UR QL STRIP.AUTO: ABNORMAL
NITRITE UR QL STRIP.AUTO: NEGATIVE
PH UR STRIP: 6.5 (ref 5–8)
PROT UR STRIP-MCNC: NEGATIVE MG/DL
RBC #/AREA URNS HPF: ABNORMAL /HPF (ref 0–5)
SP GR UR REFRACTOMETRY: 1.01
URINE CULTURE IF INDICATED: ABNORMAL
UROBILINOGEN UR QL STRIP.AUTO: 1 EU/DL (ref 0.2–1)
WBC URNS QL MICRO: ABNORMAL /HPF (ref 0–4)

## 2024-06-17 PROCEDURE — 99285 EMERGENCY DEPT VISIT HI MDM: CPT

## 2024-06-17 PROCEDURE — 83735 ASSAY OF MAGNESIUM: CPT

## 2024-06-17 PROCEDURE — 81001 URINALYSIS AUTO W/SCOPE: CPT

## 2024-06-17 PROCEDURE — 36415 COLL VENOUS BLD VENIPUNCTURE: CPT

## 2024-06-17 PROCEDURE — 80053 COMPREHEN METABOLIC PANEL: CPT

## 2024-06-17 PROCEDURE — 85025 COMPLETE CBC W/AUTO DIFF WBC: CPT

## 2024-06-17 PROCEDURE — 87086 URINE CULTURE/COLONY COUNT: CPT

## 2024-06-17 PROCEDURE — 83690 ASSAY OF LIPASE: CPT

## 2024-06-17 ASSESSMENT — LIFESTYLE VARIABLES
HOW MANY STANDARD DRINKS CONTAINING ALCOHOL DO YOU HAVE ON A TYPICAL DAY: 1 OR 2
HOW OFTEN DO YOU HAVE A DRINK CONTAINING ALCOHOL: 4 OR MORE TIMES A WEEK

## 2024-06-17 ASSESSMENT — PAIN DESCRIPTION - PAIN TYPE: TYPE: ACUTE PAIN

## 2024-06-17 ASSESSMENT — PAIN DESCRIPTION - FREQUENCY: FREQUENCY: CONTINUOUS

## 2024-06-17 ASSESSMENT — PAIN DESCRIPTION - ONSET: ONSET: PROGRESSIVE

## 2024-06-17 ASSESSMENT — PAIN DESCRIPTION - DESCRIPTORS: DESCRIPTORS: SHARP;PRESSURE

## 2024-06-17 ASSESSMENT — PAIN - FUNCTIONAL ASSESSMENT: PAIN_FUNCTIONAL_ASSESSMENT: 0-10

## 2024-06-17 ASSESSMENT — PAIN DESCRIPTION - ORIENTATION: ORIENTATION: RIGHT;UPPER

## 2024-06-17 ASSESSMENT — PAIN SCALES - GENERAL: PAINLEVEL_OUTOF10: 10

## 2024-06-17 ASSESSMENT — PAIN DESCRIPTION - LOCATION: LOCATION: ABDOMEN

## 2024-06-17 NOTE — TELEPHONE ENCOUNTER
From: Shoshana Diego  To: Dr. Tang Rg  Sent: 6/14/2024 12:57 PM EDT  Subject: Appointment Request    Appointment Request From: Shoshana Diego    With Provider: Tang Rg MD [[facility]]    Preferred Date Range: Any    Preferred Times: Any Time    Reason for visit: Request an Appointment    Comments:  I was wondering if I could get drained before our appt please.          6/17/24@1030 Spoke w/patient. Placed order for para and patient will call in schedule appt.(KF)

## 2024-06-18 ENCOUNTER — HOSPITAL ENCOUNTER (OUTPATIENT)
Facility: HOSPITAL | Age: 39
Setting detail: OBSERVATION
Discharge: HOME | End: 2024-06-18
Attending: EMERGENCY MEDICINE | Admitting: STUDENT IN AN ORGANIZED HEALTH CARE EDUCATION/TRAINING PROGRAM
Payer: MEDICAID

## 2024-06-18 ENCOUNTER — APPOINTMENT (OUTPATIENT)
Facility: HOSPITAL | Age: 39
End: 2024-06-18
Attending: STUDENT IN AN ORGANIZED HEALTH CARE EDUCATION/TRAINING PROGRAM
Payer: MEDICAID

## 2024-06-18 ENCOUNTER — APPOINTMENT (OUTPATIENT)
Facility: HOSPITAL | Age: 39
End: 2024-06-18
Payer: MEDICAID

## 2024-06-18 VITALS
HEART RATE: 109 BPM | BODY MASS INDEX: 23.55 KG/M2 | SYSTOLIC BLOOD PRESSURE: 103 MMHG | TEMPERATURE: 97.9 F | WEIGHT: 132.94 LBS | OXYGEN SATURATION: 97 % | RESPIRATION RATE: 16 BRPM | DIASTOLIC BLOOD PRESSURE: 58 MMHG | HEIGHT: 63 IN

## 2024-06-18 DIAGNOSIS — K70.31 ASCITES DUE TO ALCOHOLIC CIRRHOSIS (HCC): Primary | ICD-10-CM

## 2024-06-18 DIAGNOSIS — E87.6 HYPOKALEMIA: ICD-10-CM

## 2024-06-18 PROBLEM — K74.60 DECOMPENSATION OF CIRRHOSIS OF LIVER (HCC): Status: ACTIVE | Noted: 2024-06-18

## 2024-06-18 PROBLEM — K72.90 DECOMPENSATION OF CIRRHOSIS OF LIVER (HCC): Status: ACTIVE | Noted: 2024-06-18

## 2024-06-18 LAB
ALBUMIN FLD-MCNC: 0.6 G/DL
ALBUMIN SERPL-MCNC: 1.8 G/DL (ref 3.5–5)
ALBUMIN SERPL-MCNC: 2.1 G/DL (ref 3.5–5)
ALBUMIN/GLOB SERPL: 0.4 (ref 1.1–2.2)
ALBUMIN/GLOB SERPL: 0.4 (ref 1.1–2.2)
ALP SERPL-CCNC: 133 U/L (ref 45–117)
ALP SERPL-CCNC: 165 U/L (ref 45–117)
ALT SERPL-CCNC: 26 U/L (ref 12–78)
ALT SERPL-CCNC: 34 U/L (ref 12–78)
ANION GAP SERPL CALC-SCNC: 6 MMOL/L (ref 5–15)
ANION GAP SERPL CALC-SCNC: 7 MMOL/L (ref 5–15)
APPEARANCE FLD: ABNORMAL
APTT PPP: 35.6 SEC (ref 22.1–31)
AST SERPL-CCNC: 75 U/L (ref 15–37)
AST SERPL-CCNC: 98 U/L (ref 15–37)
BASOPHILS # BLD: 0.1 K/UL (ref 0–0.1)
BASOPHILS # BLD: 0.1 K/UL (ref 0–0.1)
BASOPHILS NFR BLD: 1 % (ref 0–1)
BASOPHILS NFR BLD: 1 % (ref 0–1)
BILIRUB SERPL-MCNC: 1.8 MG/DL (ref 0.2–1)
BILIRUB SERPL-MCNC: 2.2 MG/DL (ref 0.2–1)
BUN SERPL-MCNC: 6 MG/DL (ref 6–20)
BUN SERPL-MCNC: 6 MG/DL (ref 6–20)
BUN/CREAT SERPL: 11 (ref 12–20)
BUN/CREAT SERPL: 15 (ref 12–20)
CALCIUM SERPL-MCNC: 7.7 MG/DL (ref 8.5–10.1)
CALCIUM SERPL-MCNC: 8.3 MG/DL (ref 8.5–10.1)
CHLORIDE SERPL-SCNC: 108 MMOL/L (ref 97–108)
CHLORIDE SERPL-SCNC: 112 MMOL/L (ref 97–108)
CO2 SERPL-SCNC: 24 MMOL/L (ref 21–32)
CO2 SERPL-SCNC: 26 MMOL/L (ref 21–32)
COLOR FLD: YELLOW
COMMENT:: NORMAL
CREAT SERPL-MCNC: 0.41 MG/DL (ref 0.55–1.02)
CREAT SERPL-MCNC: 0.54 MG/DL (ref 0.55–1.02)
DIFFERENTIAL METHOD BLD: ABNORMAL
DIFFERENTIAL METHOD BLD: ABNORMAL
EOSINOPHIL # BLD: 0.2 K/UL (ref 0–0.4)
EOSINOPHIL # BLD: 0.3 K/UL (ref 0–0.4)
EOSINOPHIL NFR BLD: 3 % (ref 0–7)
EOSINOPHIL NFR BLD: 4 % (ref 0–7)
ERYTHROCYTE [DISTWIDTH] IN BLOOD BY AUTOMATED COUNT: 22.1 % (ref 11.5–14.5)
ERYTHROCYTE [DISTWIDTH] IN BLOOD BY AUTOMATED COUNT: 22.2 % (ref 11.5–14.5)
GLOBULIN SER CALC-MCNC: 4.3 G/DL (ref 2–4)
GLOBULIN SER CALC-MCNC: 5.2 G/DL (ref 2–4)
GLUCOSE SERPL-MCNC: 106 MG/DL (ref 65–100)
GLUCOSE SERPL-MCNC: 94 MG/DL (ref 65–100)
HCG SERPL QL: NEGATIVE
HCT VFR BLD AUTO: 24.3 % (ref 35–47)
HCT VFR BLD AUTO: 30 % (ref 35–47)
HGB BLD-MCNC: 7.7 G/DL (ref 11.5–16)
HGB BLD-MCNC: 9.7 G/DL (ref 11.5–16)
IMM GRANULOCYTES # BLD AUTO: 0 K/UL (ref 0–0.04)
IMM GRANULOCYTES # BLD AUTO: 0 K/UL (ref 0–0.04)
IMM GRANULOCYTES NFR BLD AUTO: 0 % (ref 0–0.5)
IMM GRANULOCYTES NFR BLD AUTO: 0 % (ref 0–0.5)
INR PPP: 1.5 (ref 0.9–1.1)
INR PPP: 1.5 (ref 0.9–1.1)
LIPASE SERPL-CCNC: 74 U/L (ref 13–75)
LYMPHOCYTES # BLD: 2.1 K/UL (ref 0.8–3.5)
LYMPHOCYTES # BLD: 2.5 K/UL (ref 0.8–3.5)
LYMPHOCYTES NFR BLD: 30 % (ref 12–49)
LYMPHOCYTES NFR BLD: 31 % (ref 12–49)
LYMPHOCYTES NFR FLD: 6 %
MAGNESIUM SERPL-MCNC: 1.7 MG/DL (ref 1.6–2.4)
MAGNESIUM SERPL-MCNC: 1.7 MG/DL (ref 1.6–2.4)
MCH RBC QN AUTO: 31.3 PG (ref 26–34)
MCH RBC QN AUTO: 32.3 PG (ref 26–34)
MCHC RBC AUTO-ENTMCNC: 31.7 G/DL (ref 30–36.5)
MCHC RBC AUTO-ENTMCNC: 32.3 G/DL (ref 30–36.5)
MCV RBC AUTO: 100 FL (ref 80–99)
MCV RBC AUTO: 98.8 FL (ref 80–99)
MESOTHL CELL NFR FLD: 5 %
MONOCYTES # BLD: 0.8 K/UL (ref 0–1)
MONOCYTES # BLD: 1 K/UL (ref 0–1)
MONOCYTES NFR BLD: 12 % (ref 5–13)
MONOCYTES NFR BLD: 12 % (ref 5–13)
MONOS+MACROS NFR FLD: 88 %
NEUTROPHILS NFR FLD: 1 %
NEUTS SEG # BLD: 3.7 K/UL (ref 1.8–8)
NEUTS SEG # BLD: 4.3 K/UL (ref 1.8–8)
NEUTS SEG NFR BLD: 53 % (ref 32–75)
NEUTS SEG NFR BLD: 53 % (ref 32–75)
NRBC # BLD: 0 K/UL (ref 0–0.01)
NRBC # BLD: 0 K/UL (ref 0–0.01)
NRBC BLD-RTO: 0 PER 100 WBC
NRBC BLD-RTO: 0 PER 100 WBC
NUC CELL # FLD: 125 /CU MM
PLATELET # BLD AUTO: 104 K/UL (ref 150–400)
PLATELET # BLD AUTO: 122 K/UL (ref 150–400)
PMV BLD AUTO: 8.8 FL (ref 8.9–12.9)
PMV BLD AUTO: 9 FL (ref 8.9–12.9)
POTASSIUM SERPL-SCNC: 2.7 MMOL/L (ref 3.5–5.1)
POTASSIUM SERPL-SCNC: 3.1 MMOL/L (ref 3.5–5.1)
PROT SERPL-MCNC: 6.1 G/DL (ref 6.4–8.2)
PROT SERPL-MCNC: 7.3 G/DL (ref 6.4–8.2)
PROTHROMBIN TIME: 15.1 SEC (ref 9–11.1)
PROTHROMBIN TIME: 15.2 SEC (ref 9–11.1)
RBC # BLD AUTO: 2.46 M/UL (ref 3.8–5.2)
RBC # BLD AUTO: 3 M/UL (ref 3.8–5.2)
RBC # FLD: 49 /CU MM
RBC MORPH BLD: ABNORMAL
SODIUM SERPL-SCNC: 140 MMOL/L (ref 136–145)
SODIUM SERPL-SCNC: 143 MMOL/L (ref 136–145)
SPECIMEN HOLD: NORMAL
SPECIMEN SOURCE FLD: ABNORMAL
SPECIMEN SOURCE FLD: NORMAL
THERAPEUTIC RANGE: ABNORMAL SECS (ref 58–77)
WBC # BLD AUTO: 7 K/UL (ref 3.6–11)
WBC # BLD AUTO: 8.1 K/UL (ref 3.6–11)

## 2024-06-18 PROCEDURE — 6360000002 HC RX W HCPCS: Performed by: EMERGENCY MEDICINE

## 2024-06-18 PROCEDURE — 80053 COMPREHEN METABOLIC PANEL: CPT

## 2024-06-18 PROCEDURE — 89050 BODY FLUID CELL COUNT: CPT

## 2024-06-18 PROCEDURE — 83735 ASSAY OF MAGNESIUM: CPT

## 2024-06-18 PROCEDURE — G0378 HOSPITAL OBSERVATION PER HR: HCPCS

## 2024-06-18 PROCEDURE — 96365 THER/PROPH/DIAG IV INF INIT: CPT

## 2024-06-18 PROCEDURE — 96375 TX/PRO/DX INJ NEW DRUG ADDON: CPT

## 2024-06-18 PROCEDURE — 96366 THER/PROPH/DIAG IV INF ADDON: CPT

## 2024-06-18 PROCEDURE — 82042 OTHER SOURCE ALBUMIN QUAN EA: CPT

## 2024-06-18 PROCEDURE — 2580000003 HC RX 258: Performed by: NURSE PRACTITIONER

## 2024-06-18 PROCEDURE — 36415 COLL VENOUS BLD VENIPUNCTURE: CPT

## 2024-06-18 PROCEDURE — 74177 CT ABD & PELVIS W/CONTRAST: CPT

## 2024-06-18 PROCEDURE — 93005 ELECTROCARDIOGRAM TRACING: CPT | Performed by: EMERGENCY MEDICINE

## 2024-06-18 PROCEDURE — 87040 BLOOD CULTURE FOR BACTERIA: CPT

## 2024-06-18 PROCEDURE — 6360000004 HC RX CONTRAST MEDICATION: Performed by: RADIOLOGY

## 2024-06-18 PROCEDURE — 6370000000 HC RX 637 (ALT 250 FOR IP): Performed by: NURSE PRACTITIONER

## 2024-06-18 PROCEDURE — 6360000002 HC RX W HCPCS: Performed by: NURSE PRACTITIONER

## 2024-06-18 PROCEDURE — 2709999900 US GUIDED PARACENTESIS

## 2024-06-18 PROCEDURE — 85610 PROTHROMBIN TIME: CPT

## 2024-06-18 PROCEDURE — 87205 SMEAR GRAM STAIN: CPT

## 2024-06-18 PROCEDURE — 96376 TX/PRO/DX INJ SAME DRUG ADON: CPT

## 2024-06-18 PROCEDURE — 94760 N-INVAS EAR/PLS OXIMETRY 1: CPT

## 2024-06-18 PROCEDURE — 2500000003 HC RX 250 WO HCPCS: Performed by: PHYSICIAN ASSISTANT

## 2024-06-18 PROCEDURE — 2700000000 HC OXYGEN THERAPY PER DAY

## 2024-06-18 PROCEDURE — 84703 CHORIONIC GONADOTROPIN ASSAY: CPT

## 2024-06-18 PROCEDURE — 87070 CULTURE OTHR SPECIMN AEROBIC: CPT

## 2024-06-18 PROCEDURE — 6370000000 HC RX 637 (ALT 250 FOR IP): Performed by: EMERGENCY MEDICINE

## 2024-06-18 PROCEDURE — 96367 TX/PROPH/DG ADDL SEQ IV INF: CPT

## 2024-06-18 PROCEDURE — 85730 THROMBOPLASTIN TIME PARTIAL: CPT

## 2024-06-18 PROCEDURE — 85025 COMPLETE CBC W/AUTO DIFF WBC: CPT

## 2024-06-18 PROCEDURE — 96368 THER/DIAG CONCURRENT INF: CPT

## 2024-06-18 RX ORDER — FUROSEMIDE 40 MG/1
40 TABLET ORAL DAILY
Status: DISCONTINUED | OUTPATIENT
Start: 2024-06-18 | End: 2024-06-18 | Stop reason: HOSPADM

## 2024-06-18 RX ORDER — LEVOFLOXACIN 750 MG/1
750 TABLET, FILM COATED ORAL DAILY
Qty: 3 TABLET | Refills: 0 | Status: SHIPPED | OUTPATIENT
Start: 2024-06-18 | End: 2024-06-21

## 2024-06-18 RX ORDER — MORPHINE SULFATE 4 MG/ML
4 INJECTION, SOLUTION INTRAMUSCULAR; INTRAVENOUS
Status: COMPLETED | OUTPATIENT
Start: 2024-06-18 | End: 2024-06-18

## 2024-06-18 RX ORDER — SPIRONOLACTONE 25 MG/1
100 TABLET ORAL DAILY
Status: DISCONTINUED | OUTPATIENT
Start: 2024-06-18 | End: 2024-06-18 | Stop reason: HOSPADM

## 2024-06-18 RX ORDER — INSULIN LISPRO 100 [IU]/ML
0-4 INJECTION, SOLUTION INTRAVENOUS; SUBCUTANEOUS NIGHTLY
Status: DISCONTINUED | OUTPATIENT
Start: 2024-06-18 | End: 2024-06-18

## 2024-06-18 RX ORDER — LIDOCAINE HYDROCHLORIDE 10 MG/ML
10 INJECTION, SOLUTION EPIDURAL; INFILTRATION; INTRACAUDAL; PERINEURAL ONCE
Status: COMPLETED | OUTPATIENT
Start: 2024-06-18 | End: 2024-06-18

## 2024-06-18 RX ORDER — MORPHINE SULFATE 2 MG/ML
2 INJECTION, SOLUTION INTRAMUSCULAR; INTRAVENOUS ONCE
Status: COMPLETED | OUTPATIENT
Start: 2024-06-18 | End: 2024-06-18

## 2024-06-18 RX ORDER — SODIUM CHLORIDE 0.9 % (FLUSH) 0.9 %
5-40 SYRINGE (ML) INJECTION EVERY 12 HOURS SCHEDULED
Status: DISCONTINUED | OUTPATIENT
Start: 2024-06-18 | End: 2024-06-18 | Stop reason: HOSPADM

## 2024-06-18 RX ORDER — SODIUM CHLORIDE 9 MG/ML
INJECTION, SOLUTION INTRAVENOUS PRN
Status: DISCONTINUED | OUTPATIENT
Start: 2024-06-18 | End: 2024-06-18 | Stop reason: HOSPADM

## 2024-06-18 RX ORDER — POTASSIUM CHLORIDE 7.45 MG/ML
10 INJECTION INTRAVENOUS
Status: DISCONTINUED | OUTPATIENT
Start: 2024-06-18 | End: 2024-06-18

## 2024-06-18 RX ORDER — MAGNESIUM SULFATE 1 G/100ML
1000 INJECTION INTRAVENOUS ONCE
Status: COMPLETED | OUTPATIENT
Start: 2024-06-18 | End: 2024-06-18

## 2024-06-18 RX ORDER — POTASSIUM CHLORIDE 20 MEQ/1
40 TABLET, EXTENDED RELEASE ORAL ONCE
Status: COMPLETED | OUTPATIENT
Start: 2024-06-18 | End: 2024-06-18

## 2024-06-18 RX ORDER — GAUZE BANDAGE 2" X 2"
100 BANDAGE TOPICAL DAILY
Status: DISCONTINUED | OUTPATIENT
Start: 2024-06-18 | End: 2024-06-18 | Stop reason: HOSPADM

## 2024-06-18 RX ORDER — LACTULOSE 10 G/15ML
30 SOLUTION ORAL DAILY
Status: DISCONTINUED | OUTPATIENT
Start: 2024-06-18 | End: 2024-06-18 | Stop reason: HOSPADM

## 2024-06-18 RX ORDER — GLUCAGON 1 MG/ML
1 KIT INJECTION PRN
Status: DISCONTINUED | OUTPATIENT
Start: 2024-06-18 | End: 2024-06-18 | Stop reason: HOSPADM

## 2024-06-18 RX ORDER — ONDANSETRON 2 MG/ML
4 INJECTION INTRAMUSCULAR; INTRAVENOUS EVERY 6 HOURS PRN
Status: DISCONTINUED | OUTPATIENT
Start: 2024-06-18 | End: 2024-06-18 | Stop reason: HOSPADM

## 2024-06-18 RX ORDER — ONDANSETRON 4 MG/1
4 TABLET, ORALLY DISINTEGRATING ORAL EVERY 8 HOURS PRN
Status: DISCONTINUED | OUTPATIENT
Start: 2024-06-18 | End: 2024-06-18 | Stop reason: HOSPADM

## 2024-06-18 RX ORDER — DICYCLOMINE HYDROCHLORIDE 10 MG/1
10 CAPSULE ORAL 3 TIMES DAILY PRN
Status: DISCONTINUED | OUTPATIENT
Start: 2024-06-18 | End: 2024-06-18 | Stop reason: HOSPADM

## 2024-06-18 RX ORDER — ALBUMIN (HUMAN) 12.5 G/50ML
25 SOLUTION INTRAVENOUS
Status: DISCONTINUED | OUTPATIENT
Start: 2024-06-18 | End: 2024-06-18 | Stop reason: HOSPADM

## 2024-06-18 RX ORDER — TRAZODONE HYDROCHLORIDE 100 MG/1
100 TABLET ORAL NIGHTLY PRN
Status: DISCONTINUED | OUTPATIENT
Start: 2024-06-18 | End: 2024-06-18 | Stop reason: HOSPADM

## 2024-06-18 RX ORDER — SODIUM CHLORIDE 0.9 % (FLUSH) 0.9 %
5-40 SYRINGE (ML) INJECTION PRN
Status: DISCONTINUED | OUTPATIENT
Start: 2024-06-18 | End: 2024-06-18 | Stop reason: HOSPADM

## 2024-06-18 RX ORDER — DEXTROSE MONOHYDRATE 100 MG/ML
INJECTION, SOLUTION INTRAVENOUS CONTINUOUS PRN
Status: DISCONTINUED | OUTPATIENT
Start: 2024-06-18 | End: 2024-06-18 | Stop reason: HOSPADM

## 2024-06-18 RX ORDER — PREGABALIN 75 MG/1
75 CAPSULE ORAL 2 TIMES DAILY
Status: DISCONTINUED | OUTPATIENT
Start: 2024-06-18 | End: 2024-06-18 | Stop reason: HOSPADM

## 2024-06-18 RX ORDER — FOLIC ACID 1 MG/1
1 TABLET ORAL DAILY
Status: DISCONTINUED | OUTPATIENT
Start: 2024-06-18 | End: 2024-06-18 | Stop reason: HOSPADM

## 2024-06-18 RX ORDER — INSULIN LISPRO 100 [IU]/ML
0-8 INJECTION, SOLUTION INTRAVENOUS; SUBCUTANEOUS
Status: DISCONTINUED | OUTPATIENT
Start: 2024-06-18 | End: 2024-06-18

## 2024-06-18 RX ORDER — POLYETHYLENE GLYCOL 3350 17 G/17G
17 POWDER, FOR SOLUTION ORAL DAILY PRN
Status: DISCONTINUED | OUTPATIENT
Start: 2024-06-18 | End: 2024-06-18 | Stop reason: HOSPADM

## 2024-06-18 RX ADMIN — SODIUM CHLORIDE 1000 MG: 900 INJECTION INTRAVENOUS at 04:46

## 2024-06-18 RX ADMIN — MORPHINE SULFATE 2 MG: 2 INJECTION, SOLUTION INTRAMUSCULAR; INTRAVENOUS at 04:47

## 2024-06-18 RX ADMIN — IOPAMIDOL 100 ML: 755 INJECTION, SOLUTION INTRAVENOUS at 06:02

## 2024-06-18 RX ADMIN — POTASSIUM CHLORIDE 40 MEQ: 1500 TABLET, EXTENDED RELEASE ORAL at 01:32

## 2024-06-18 RX ADMIN — POTASSIUM BICARBONATE 20 MEQ: 782 TABLET, EFFERVESCENT ORAL at 04:47

## 2024-06-18 RX ADMIN — SODIUM CHLORIDE, PRESERVATIVE FREE 5 ML: 5 INJECTION INTRAVENOUS at 09:00

## 2024-06-18 RX ADMIN — POTASSIUM CHLORIDE 10 MEQ: 7.46 INJECTION, SOLUTION INTRAVENOUS at 01:35

## 2024-06-18 RX ADMIN — MORPHINE SULFATE 4 MG: 4 INJECTION INTRAVENOUS at 02:28

## 2024-06-18 RX ADMIN — DICYCLOMINE HYDROCHLORIDE 10 MG: 10 CAPSULE ORAL at 08:54

## 2024-06-18 RX ADMIN — LIDOCAINE HYDROCHLORIDE 10 ML: 10 INJECTION, SOLUTION EPIDURAL; INFILTRATION; INTRACAUDAL; PERINEURAL at 14:10

## 2024-06-18 RX ADMIN — POTASSIUM CHLORIDE 10 MEQ: 7.46 INJECTION, SOLUTION INTRAVENOUS at 02:51

## 2024-06-18 RX ADMIN — PREGABALIN 75 MG: 75 CAPSULE ORAL at 08:54

## 2024-06-18 RX ADMIN — MAGNESIUM SULFATE HEPTAHYDRATE 1000 MG: 1 INJECTION, SOLUTION INTRAVENOUS at 04:45

## 2024-06-18 ASSESSMENT — PAIN SCALES - GENERAL
PAINLEVEL_OUTOF10: 9
PAINLEVEL_OUTOF10: 0

## 2024-06-18 ASSESSMENT — PAIN DESCRIPTION - LOCATION: LOCATION: ABDOMEN

## 2024-06-18 ASSESSMENT — PAIN DESCRIPTION - ORIENTATION: ORIENTATION: ANTERIOR

## 2024-06-18 ASSESSMENT — PAIN DESCRIPTION - DESCRIPTORS: DESCRIPTORS: ACHING

## 2024-06-18 NOTE — ED PROVIDER NOTES
have escaped final proofreading.)         Osvaldo Hendricks MD  06/18/24 0402       Osvaldo Hendricks MD  06/18/24 0401

## 2024-06-18 NOTE — H&P
Hospitalist Admission Note    NAME:   Shoshana Diego   : 1985   MRN: [id number]     Date/Time: 2024 3:35 AM    Patient PCP: Yee Johnston MD    ______________________________________________________________________  Given the patient's current clinical presentation, I have a high level of concern for decompensation if discharged from the emergency department.  Complex decision making was performed, which includes reviewing the patient's available past medical records, laboratory results, and x-ray films.       My assessment of this patient's clinical condition and my plan of care is as follows.    Assessment / Plan:    Acute RUQ abd pain causing dyspnea, progressively worsening throughout the day, states pcp directed to come to ER for paracentesis, last etoh drink was 2 hrs pta ER to help relieve abd pain.  -admit observation  -IR consult u/s guided therapeutic paracentesis for am routine ordered w/cell count, albumin iv on call to paracentesis, may require additional albumin should it be a large volume ascitic fluid removal as I suspect it may be  -am labs  -npo until paracentesis in the am, consider adv JOSE FRANCISCO to regular/cardiac  -states cirrhosis dx 2024 newly dx, states drained 2x total since   -states dry weight is 120 and is been up to 135-140lbs recently  -morphine 4mg in ER, will do additional x1 dose 2mg morphine now, then bentyl prn as below as is likely to have some pain until the paracentesis   -bentyl 10mg tid prn   -etoh cessation d/w pt, presently not ready to completely eliminate etoh  -ct a/p stat- pending ordered on admit  -antbx as below given RUQ tender on palpation, remains afebrile, wbc wnl- reasonable at this time to start coverage for possible SBP also  -psychiatry consult    Mild acute hypokalemia, asymptomatic k 2.7 poa  Borderline normal magnesium 1.7 poa  -monitor lytes, consider replete prn  -kcl 20meq po x1 dose now, pt received 2 doses kcl 10meq iv- but tells me she

## 2024-06-18 NOTE — CARE COORDINATION
RUR: N/A  Readmission? No  IM? No - patient has Medicaid     CM met with pt at bedside to complete initial case management assessment. Pt's friend, Ursula, was present in the room. Pt confirmed that Ursula will provide her with transportation home from the hospital.     Pharmacy: [id number] [address] [phone] - F [phone] [[id number]]     Pt lives alone at the address listed on her facesheet. Pt uses a cane as needed at home and reports that she is independent in her ADLs/IADLs. Pt does not have a hx of home O2, home health services, or a stay at a SNF for rehab services. Pt does not feel that she needs any additional resources and/or services at home or in the community at this time.    Pt expressed that she would like to return home today after \"being drained.\" PT/OT cleared for discharge. Discharge pending urine cultures. Case management available for any discharge needs that arise.     06/18/24 4589   Service Assessment   Patient Orientation Alert and Oriented   Cognition Alert   History Provided By Patient   Primary Caregiver Self   Accompanied By/Relationship Friend, Ursula   Support Systems Parent;Friends/Neighbors   Patient's Healthcare Decision Maker is: Legal Next of Kin  (Parent/father, Mingo Diego, [phone] and [phone])   PCP Verified by CM Yes  (Yee Johnston MD)   Last Visit to PCP Within last 3 months  (1 month ago)   Prior Functional Level Independent in ADLs/IADLs   Current Functional Level Independent in ADLs/IADLs   Can patient return to prior living arrangement Yes   Ability to make needs known: Good   Family able to assist with home care needs: No  (Patient lives alone, but has a boyfriend that is over sometimes.)   Would you like for me to discuss the discharge plan with any other family members/significant others, and if so, who? No  (Patient did not specify. Pt's friend, Ursula, was present in the room and said that she would like updates on pt's

## 2024-06-18 NOTE — DISCHARGE SUMMARY
in light of mild glucose elevation and risk for hypoglycemia     Chronic anemia, hgb at baseline, no active bld reported, likely 2/2 cirrhosis  -monitor hgb for now     Chronic Conditions:  H/o Macrocytosis w/o anemia, AUD in remission, folate deficiency, h/o hypokalemia and hypomagnesemia, etoh polyneuropathy, decompensated hepatic cirrhosis w/recurrent paracentesis dx 2021 CLD and then cirr 5/2024 when developed ascites, last MELD 21 and child class jeong B, and CTP 9 per hepatology note 5/2024, hepatic encephalopathy, osteoporosis, bartholin cyst, FROYLAN, h/o MRSA 2021 left and right axillary, vaginal herpes, recurrent skin abscesses.   -hold home prn metrogel - pt states uses for vaginal herpes, consider resume on dc  **home medications reviewed with patient and resumed as appropriate aside from as otherwise mentioned in this note **    Discharge Exam:  Patient seen and examined by me on discharge day.  Pertinent Findings:  Patient Vitals for the past 24 hrs:   BP Temp Temp src Pulse Resp SpO2 Height Weight   06/18/24 1350 105/72 -- -- 88 20 98 % -- --   06/18/24 1340 98/70 -- -- 85 20 98 % -- --   06/18/24 1330 103/69 -- -- 89 20 98 % -- --   06/18/24 1210 99/72 97.9 °F (36.6 °C) -- 98 18 96 % -- --   06/18/24 0637 99/73 98.2 °F (36.8 °C) Oral 90 17 93 % -- --   06/18/24 0530 90/61 98.2 °F (36.8 °C) Oral 97 16 92 % -- --   06/18/24 0500 (!) 87/64 -- -- 96 14 94 % -- --   06/18/24 0430 90/66 -- -- 96 15 93 % -- --   06/18/24 0349 (!) 88/62 -- -- (!) 107 18 93 % -- --   06/18/24 0230 101/69 -- -- (!) 115 15 92 % -- --   06/18/24 0124 -- -- -- -- -- 96 % -- --   06/18/24 0115 -- -- -- (!) 104 24 (!) 86 % -- --   06/18/24 0049 -- -- -- 100 20 98 % -- --   06/18/24 0042 99/65 -- -- -- -- -- -- --   06/17/24 2304 98/72 98.1 °F (36.7 °C) Oral (!) 113 18 100 % 1.6 m (5' 3\") 60.3 kg (132 lb 15 oz)       Gen:    Not in distress  Chest: Clear lungs  CVS:   Regular rhythm.  No edema  Abd:  Soft, not distended, not

## 2024-06-18 NOTE — BSMART NOTE
pt's report.  Panic is observed by sometimes per pt's report. Phobias are not observed.  Obsessive compulsive tendencies are not observed.      Section III - Mental Status Exam  The patient's appearance is frustrated with being in the hospital.  The patient's behavior is restless. The patient is oriented to time, place, person and situation.  The patient's speech shows no evidence of impairment.  The patient's mood is sad and frustrated.  The range of affect is constricted.  The patient's thought content demonstrates no evidence of impairment.  The thought process shows no evidence of impairment.  The patient's perception shows no evidence of impairment. The patient's memory shows no evidence of impairment.  The patient's appetite shows no evidence of impairment.  The patient's sleep shows no evidence of impairment. The patient's insight shows no evidence of impairment.  The patient's judgement shows no evidence of impairment.      The patient has demonstrated mental capacity to provide informed consent.    Section IV - Substance Abuse  The patient is  using substances.  The patient claims to be drinking up to 2 glasses of whiskey and a chaser weekly since 5/1/2024. Prior to this date, pt reports drinking same amount, but daily. She says that she would have more during social gatherings. Pt claims that she has never had alcohol withdrawal symptoms. She has never participated in SA rehab. Dx'd with cirrhosis of the liver 3 months ago. Alcoholism reportedly runs in the family. Parents drink every night. UDS result: unk BAL: unk    Section V - Medical  Past Medical History:   Diagnosis Date    Bartholin cyst     Cirrhosis of liver with ascites (HCC)     Hospitalized on 05/01/24    Cirrhosis of liver with ascites (HCC)     Hepatic encephalopathy (HCC)     Infectious disease 2012    MRSA in left and right axillary    Neuropathy     Sleep apnea        Section VI - Living Arrangements  The patient Single.  The patient

## 2024-06-18 NOTE — ACP (ADVANCE CARE PLANNING)
Advance Care Planning     Advance Care Planning Inpatient Note  [facility] Department    Today's Date: 6/18/2024  Unit: [facility] 1 ORTHOPEDICS    Received request from IDT Member.  Upon review of chart and communication with care team, patient's decision making abilities are not in question.. Patient was/were present in the room during visit.    Goals of ACP Conversation:  Discuss advance care planning documents    Health Care Decision Makers:     No healthcare decision makers have been documented.  Click here to complete HealthCare Decision Makers including selection of the Healthcare Decision Maker Relationship (ie \"Primary\")  Summary:  No Decision Maker named by patient at this time     Advance Care Planning Documents (Patient Wishes):  None     Assessment:   responded to request from patient's nurse for an AMD consult in 109. Patient seemed weak and tired in bed and affirmed she was tired when  asked. She declined consult, stating she has no recollection of having an AMD conversation with staff and was not interested in completing one at this time.       Interventions:  Patient DECLINED ACP conversation    Care Preferences Communicated:   No    Outcomes/Plan:  ACP Discussion: Completed    Electronically signed by FRANCHESKA Cespedes on 6/18/2024 at 10:00 AM

## 2024-06-18 NOTE — DISCHARGE INSTRUCTIONS
HOSPITALIST DISCHARGE INSTRUCTIONS    NAME: Shoshana Diego   :  1985   MRN:  [id number]     Date/Time:  2024 3:18 PM    ADMIT DATE: 2024     DISCHARGE DATE: 2024     DISCHARGE DIAGNOSIS:  Acute RUQ abd pain/Tense Ascites POA - resolved s/p therapeutic paracentesis by IR  Mild acute hypokalemia, asymptomatic POA- replenished  Chronic anemia, hgb at baseline, no active bld reported, likely 2/2 cirrhosis  H/o Macrocytosis w/o anemia  Alcoholism   Full code    MEDICATIONS:  As per medication reconciliation  list  It is important that you take the medication exactly as they are prescribed.   Keep your medication in the bottles provided by the pharmacist and keep a list of the medication names, dosages, and times to be taken in your wallet.   Do not take other medications without consulting your doctor.     Pain Management: per above medications    What to do at Home    Recommended diet:  cardiac diet    Recommended activity: activity as tolerated    If you have questions regarding the hospital related prescriptions or hospital related issues please call at .    If you experience any of the following symptoms then please call your primary care physician or return to the emergency room if you cannot get hold of your doctor:  Fever, chills, nausea, vomiting, diarrhea, change in mentation, falling, bleeding, shortness of breath,     Follow Up:  PCP  you are to call and set up an appointment to see them in 7-10 days.  Dr Arcenio Stoner ([facility])  OP substance/alcohol abuse clinics -- resources provided by       Information obtained by :  I understand that if any problems occur once I am at home I am to contact my physician.    I understand and acknowledge receipt of the instructions indicated above.                                                                                                                                           Physician's or R.N.'s

## 2024-06-18 NOTE — PROGRESS NOTES
DC instructions reviewed. Questions answered. Stressed the importance of follow up, medication compliance, ETOH cessation and a healthy lifestyle. Patient verbalized understanding.  
Name of Procedure:      Vital Signs:  VSS throughout     Fluids Removed: 4500 ml      Samples sent to lab: red, lavender, hold, culture     Any complications related to procedure: none identified at this time     Patient is A&Ox4, on RA, and is in NAD at this time.    1420 - Attempt to call report, no answer  
Occupational Therapy  6/18/2024    Orders received and pt screened for therapy. Pt sitting upright in bed, reports independence in ADLs and functional mobility at baseline. Pt reports \"I'm here to get drained and go home\". Pt with no concerns for safety in ADLs and functional mobility upon discharge. Will complete order.     Thank you,   Cheryl Wesley, OTR/L    
Patient to US for Paracentesis via stretcher. Tele box on.  
Physical Therapy:    0830  Orders received and acknowledged. Pt's chart reviewed. Per nursing pt is going to IR this morning for paracentesis. PT will defer at this time and will follow up as able for evaluation post procedure.     1130  Pt going for paracentesis later and cleared by nursing to be seen at this time. PT approached pt and explained purpose of evaluation. Pt states she has just been up and would like to lay and watch TV. \"I just have to get drained and go home\". Pt refused PT evaluation stating she is independent at baseline. PT will sign off.   
Jay Huerta, [facility].  [facility] Hospital[facility] Service: [phone])

## 2024-06-18 NOTE — ED NOTES
ED TO INPATIENT SBAR HANDOFF    Patient Name: Shoshana Diego   Preferred Name: Shoshana Diego  : 1985  39 y.o.   Family/Caregiver Present: no   Code Status Order: Full Code  PO Status: NPO:Yes  Telemetry Order: Yes  C-SSRS: Risk of Suicide: No Risk  Sitter no   Restraints:     Sepsis Risk Score      Situation  Chief Complaint   Patient presents with    Abdominal Pain     Patient complaining of right upper abdominal pain which is causing her shortness of breath which started earlier this morning and then worsened throughout the day. Patient has a history of liver cirrhosis and ascites. Patient called her PCP and they advised her to come to the ED to get a paracentesis. Patient's last drink was two hours ago to help relieve the abdominal pain. Denies any analgesics since the onset of pain tonight.     Brief Description of Patient's Condition: Pt resting comfortably at this time with eyes closed. Pt has ascites caused by liver cirrhosis caused by alcoholism.   Mental Status: oriented, alert, coherent, logical, thought processes intact, and able to concentrate and follow conversation  Arrived from:Home  Imaging:   CT ABDOMEN PELVIS W IV CONTRAST Additional Contrast? Radiologist Recommendation    (Results Pending)     Abnormal labs:   Abnormal Labs Reviewed   CBC WITH AUTO DIFFERENTIAL - Abnormal; Notable for the following components:       Result Value    RBC 3.00 (*)     Hemoglobin 9.7 (*)     Hematocrit 30.0 (*)     .0 (*)     RDW 22.1 (*)     Platelets 122 (*)     All other components within normal limits   COMPREHENSIVE METABOLIC PANEL - Abnormal; Notable for the following components:    Potassium 2.7 (*)     Glucose 106 (*)     Creatinine 0.54 (*)     BUN/Creatinine Ratio 11 (*)     Calcium 8.3 (*)     Total Bilirubin 2.2 (*)     AST 98 (*)     Alk Phosphatase 165 (*)     Albumin 2.1 (*)     Globulin 5.2 (*)     Albumin/Globulin Ratio 0.4 (*)     All other components within normal limits   URINALYSIS WITH

## 2024-06-18 NOTE — PLAN OF CARE
Problem: Safety - Adult  Goal: Free from fall injury  6/18/2024 1518 by Marlene Hayward RN  Outcome: Adequate for Discharge  Flowsheets (Taken 6/18/2024 0840)  Free From Fall Injury: Instruct family/caregiver on patient safety  6/18/2024 0648 by Lisha Montoya LPN  Outcome: Progressing     Problem: Pain  Goal: Verbalizes/displays adequate comfort level or baseline comfort level  Outcome: Adequate for Discharge

## 2024-06-19 ENCOUNTER — TELEPHONE (OUTPATIENT)
Facility: CLINIC | Age: 39
End: 2024-06-19

## 2024-06-19 LAB
BACTERIA SPEC CULT: NORMAL
SERVICE CMNT-IMP: NORMAL

## 2024-06-19 NOTE — TELEPHONE ENCOUNTER
Care Transitions Initial Follow Up Call    Outreach made within 2 business days of discharge: Yes    Patient: Shoshana Diego Patient : 1985   MRN: [id number]  Reason for Admission: There are no discharge diagnoses documented for the most recent discharge.  Discharge Date: 24       Spoke with: Left message to schedule a hosp f/up appt with PCP     Discharge department/facility: [facility]    Scheduled appointment with PCP within 7-14 days    Follow Up  Future Appointments   Date Time Provider Department Center   2024 12:30 PM Tang Rg MD [facility] J[facility]AL Martin

## 2024-06-19 NOTE — TELEPHONE ENCOUNTER
Care Transitions Initial Follow Up Call    Outreach made within 2 business days of discharge: Yes    Patient: Shoshana Diego Patient : 1985   MRN: [id number]  Reason for Admission: There are no discharge diagnoses documented for the most recent discharge.  Discharge Date: 24    TCM Interactive Patient Contact:  Was patient able to fill all prescriptions: Yes  Was patient instructed to bring all medications to the follow-up visit: Yes  Is patient taking all medications as directed in the discharge summary? Yes  Does patient understand their discharge instructions: Yes  Does patient have questions or concerns that need addressed prior to 7-14 day follow up office visit: no    Scheduled appointment with PCP within 7-14 days    Follow Up  Future Appointments   Date Time Provider Department Center   2024 12:30 PM Tang Rg MD [facility] AMB   2024 10:30 AM Yee Johnston MD [facility] AMB       Deanna Pickens LPN

## 2024-06-21 LAB
EKG ATRIAL RATE: 104 BPM
EKG DIAGNOSIS: NORMAL
EKG P AXIS: 56 DEGREES
EKG P-R INTERVAL: 162 MS
EKG Q-T INTERVAL: 358 MS
EKG QRS DURATION: 74 MS
EKG QTC CALCULATION (BAZETT): 470 MS
EKG R AXIS: 18 DEGREES
EKG T AXIS: 25 DEGREES
EKG VENTRICULAR RATE: 104 BPM

## 2024-06-22 LAB
BACTERIA SPEC CULT: NORMAL
GRAM STN SPEC: NORMAL
GRAM STN SPEC: NORMAL
SERVICE CMNT-IMP: NORMAL

## 2024-06-23 LAB
BACTERIA SPEC CULT: NORMAL
BACTERIA SPEC CULT: NORMAL
SERVICE CMNT-IMP: NORMAL
SERVICE CMNT-IMP: NORMAL

## 2024-06-25 ENCOUNTER — OFFICE VISIT (OUTPATIENT)
Age: 39
End: 2024-06-25
Payer: MEDICAID

## 2024-06-25 VITALS
HEART RATE: 104 BPM | BODY MASS INDEX: 22.5 KG/M2 | RESPIRATION RATE: 16 BRPM | SYSTOLIC BLOOD PRESSURE: 103 MMHG | TEMPERATURE: 97.3 F | HEIGHT: 63 IN | DIASTOLIC BLOOD PRESSURE: 66 MMHG | WEIGHT: 127 LBS

## 2024-06-25 DIAGNOSIS — K70.31 ALCOHOLIC CIRRHOSIS OF LIVER WITH ASCITES (HCC): ICD-10-CM

## 2024-06-25 DIAGNOSIS — R52 PAIN: Primary | ICD-10-CM

## 2024-06-25 PROCEDURE — 99214 OFFICE O/P EST MOD 30 MIN: CPT | Performed by: INTERNAL MEDICINE

## 2024-06-25 RX ORDER — SPIRONOLACTONE 50 MG/1
TABLET, FILM COATED ORAL
COMMUNITY
Start: 2024-06-11 | End: 2024-07-01 | Stop reason: SDUPTHER

## 2024-06-25 RX ORDER — FUROSEMIDE 40 MG/1
20 TABLET ORAL DAILY
Qty: 90 TABLET | Refills: 3
Start: 2024-06-25

## 2024-06-25 RX ORDER — OXYCODONE HYDROCHLORIDE 5 MG/1
5 TABLET ORAL
Qty: 30 TABLET | Refills: 0 | Status: SHIPPED | OUTPATIENT
Start: 2024-06-25 | End: 2024-07-25

## 2024-06-25 RX ORDER — POTASSIUM CHLORIDE 750 MG/1
14 TABLET, FILM COATED, EXTENDED RELEASE ORAL
COMMUNITY
Start: 2024-05-09

## 2024-06-25 ASSESSMENT — PATIENT HEALTH QUESTIONNAIRE - PHQ9
SUM OF ALL RESPONSES TO PHQ QUESTIONS 1-9: 2
1. LITTLE INTEREST OR PLEASURE IN DOING THINGS: NOT AT ALL
SUM OF ALL RESPONSES TO PHQ QUESTIONS 1-9: 2
2. FEELING DOWN, DEPRESSED OR HOPELESS: MORE THAN HALF THE DAYS
SUM OF ALL RESPONSES TO PHQ QUESTIONS 1-9: 2
SUM OF ALL RESPONSES TO PHQ QUESTIONS 1-9: 2
SUM OF ALL RESPONSES TO PHQ9 QUESTIONS 1 & 2: 2

## 2024-06-25 NOTE — PROGRESS NOTES
[facility]      Tang Rg MD, FACP, FACG, FAASLD      PATRIZIA Wetzel-MARY Hubbard, [facility]   Tamica Mitchellyenifer, [facility]   Sondrafrancheska Steven, [facility]-  Mark Ponce, [facility]   Mikayla Arauz, [facility]   Jane Aston, [facility]   [address], [address]   [place]  [address] [phone]   FAX: [phone]  [facility]   [address], [address]   [place]  [address] [phone]   FAX: [phone]       Patient Care Team:  Yee Johnston MD as PCP - General  Yee Johnston MD as PCP - Empaneled Provider      Patient Active Problem List   Diagnosis    Anemia    Alcohol abuse, in remission    Alcoholic polyneuropathy (HCC)    Cirrhosis (HCC)    Alcohol induced liver disorder (HCC)    Ascites    Hemorrhoids    Hepatic encephalopathy (HCC)    Sleep apnea       Shoshana Diego is being seen at [facility] for management of cirrhosis that secondary to alcohol induced liver disease    The active problem list, all pertinent past medical history, medications, endoscopic studies, radiologic findings and laboratory findings related to the liver disorder were reviewed and discussed with the patient.      The patient is a 39 y.o. female who was found to have chronic liver disease in 2021     She was found to have cirrhosis in 5/2024 when she developed ascites.    She used to consume 1 Handle of alcohol every 2 weeks or 20 alcohol drinks per week.  She became abstinent from alcohol in 5/2024, had a few drinks after a few weeks and is abstinent from alcohol again.    The patient has developed the following complications of cirrhosis: ascites,     In the office today the patient has the following symptoms:  fatigue,   pain in the right side over the liver,   diffuse abdominal pain,

## 2024-06-25 NOTE — PROGRESS NOTES
Identified pt with two pt identifiers(name and ). Reviewed record in preparation for visit and have obtained necessary documentation. All patient medications has been reviewed.  Chief Complaint   Patient presents with    Follow-up             Wt Readings from Last 3 Encounters:   24 57.6 kg (127 lb)   24 60.3 kg (132 lb 15 oz)   24 59 kg (130 lb)     Temp Readings from Last 3 Encounters:   24 97.3 °F (36.3 °C) (Temporal)   24 97.9 °F (36.6 °C)   24 98.5 °F (36.9 °C) (Oral)     BP Readings from Last 3 Encounters:   24 103/66   24 (!) 103/58   24 96/63     Pulse Readings from Last 3 Encounters:   24 (!) 104   24 (!) 109   24 87       \"Have you been to the ER, urgent care clinic since your last visit?  Hospitalized since your last visit?\"    YES - When: approximately 0  weeks ago.  Where and Why: [facility] .    “Have you seen or consulted any other health care providers outside of [facility] since your last visit?”    NO

## 2024-06-26 PROBLEM — K70.31 ALCOHOLIC CIRRHOSIS OF LIVER WITH ASCITES (HCC): Status: ACTIVE | Noted: 2024-06-26

## 2024-06-26 PROBLEM — K64.9 HEMORRHOIDS: Status: ACTIVE | Noted: 2024-06-26

## 2024-06-26 PROBLEM — K76.9 LIVER DISEASE: Status: ACTIVE | Noted: 2024-06-26

## 2024-06-26 PROBLEM — G47.30 SLEEP APNEA: Status: ACTIVE | Noted: 2024-06-26

## 2024-06-26 PROBLEM — R10.84 GENERALIZED ABDOMINAL PAIN: Status: ACTIVE | Noted: 2024-06-26

## 2024-06-26 PROBLEM — K76.82 HEPATIC ENCEPHALOPATHY (HCC): Status: ACTIVE | Noted: 2024-06-26

## 2024-06-26 PROBLEM — R19.7 DIARRHEA, UNSPECIFIED: Status: ACTIVE | Noted: 2024-06-26

## 2024-06-26 LAB
ALBUMIN SERPL-MCNC: 2.2 G/DL (ref 3.5–5)
ALBUMIN/GLOB SERPL: 0.4 (ref 1.1–2.2)
ALP SERPL-CCNC: 121 U/L (ref 45–117)
ALT SERPL-CCNC: 41 U/L (ref 12–78)
ANION GAP SERPL CALC-SCNC: 8 MMOL/L (ref 5–15)
AST SERPL-CCNC: 84 U/L (ref 15–37)
BASOPHILS # BLD: 0 K/UL (ref 0–0.1)
BASOPHILS NFR BLD: 0 % (ref 0–1)
BILIRUB DIRECT SERPL-MCNC: 4 MG/DL (ref 0–0.2)
BILIRUB SERPL-MCNC: 7.4 MG/DL (ref 0.2–1)
BUN SERPL-MCNC: 10 MG/DL (ref 6–20)
BUN/CREAT SERPL: 13 (ref 12–20)
CALCIUM SERPL-MCNC: 8.4 MG/DL (ref 8.5–10.1)
CHLORIDE SERPL-SCNC: 98 MMOL/L (ref 97–108)
CO2 SERPL-SCNC: 25 MMOL/L (ref 21–32)
CREAT SERPL-MCNC: 0.77 MG/DL (ref 0.55–1.02)
DIFFERENTIAL METHOD BLD: ABNORMAL
EOSINOPHIL # BLD: 0.1 K/UL (ref 0–0.4)
EOSINOPHIL NFR BLD: 1 % (ref 0–7)
ERYTHROCYTE [DISTWIDTH] IN BLOOD BY AUTOMATED COUNT: 24.6 % (ref 11.5–14.5)
GLOBULIN SER CALC-MCNC: 5 G/DL (ref 2–4)
GLUCOSE SERPL-MCNC: 107 MG/DL (ref 65–100)
HCT VFR BLD AUTO: 28 % (ref 35–47)
HGB BLD-MCNC: 8.9 G/DL (ref 11.5–16)
IMM GRANULOCYTES # BLD AUTO: 0.1 K/UL (ref 0–0.04)
IMM GRANULOCYTES NFR BLD AUTO: 1 % (ref 0–0.5)
INR PPP: 1.8 (ref 0.9–1.1)
LYMPHOCYTES # BLD: 0.9 K/UL (ref 0.8–3.5)
LYMPHOCYTES NFR BLD: 11 % (ref 12–49)
MCH RBC QN AUTO: 33.5 PG (ref 26–34)
MCHC RBC AUTO-ENTMCNC: 31.8 G/DL (ref 30–36.5)
MCV RBC AUTO: 105.3 FL (ref 80–99)
MONOCYTES # BLD: 0.6 K/UL (ref 0–1)
MONOCYTES NFR BLD: 7 % (ref 5–13)
NEUTS SEG # BLD: 6.9 K/UL (ref 1.8–8)
NEUTS SEG NFR BLD: 80 % (ref 32–75)
NRBC # BLD: 0 K/UL (ref 0–0.01)
NRBC BLD-RTO: 0 PER 100 WBC
PLATELET # BLD AUTO: 79 K/UL (ref 150–400)
PMV BLD AUTO: 12 FL (ref 8.9–12.9)
POTASSIUM SERPL-SCNC: 3.1 MMOL/L (ref 3.5–5.1)
PROT SERPL-MCNC: 7.2 G/DL (ref 6.4–8.2)
PROTHROMBIN TIME: 18.6 SEC (ref 9–11.1)
RBC # BLD AUTO: 2.66 M/UL (ref 3.8–5.2)
RBC MORPH BLD: ABNORMAL
RBC MORPH BLD: ABNORMAL
SODIUM SERPL-SCNC: 131 MMOL/L (ref 136–145)
WBC # BLD AUTO: 8.6 K/UL (ref 3.6–11)

## 2024-06-29 ENCOUNTER — TELEPHONE (OUTPATIENT)
Facility: CLINIC | Age: 39
End: 2024-06-29

## 2024-06-29 NOTE — TELEPHONE ENCOUNTER
I reviewed lab results from 6/25/24 sent to me by Hepatology. The patient's potassium was low at 3.1. I planned to have my nurse call patient to ask her the dose of spironolactone she is taking. Hepatology note states 50 mg daily and hospital discharge summary indicates 100 mg daily. However, patient has a hospital follow up appointment with me scheduled on 7/1/23. I will address these issues at that time.

## 2024-07-01 ENCOUNTER — OFFICE VISIT (OUTPATIENT)
Facility: CLINIC | Age: 39
End: 2024-07-01
Payer: MEDICAID

## 2024-07-01 VITALS
BODY MASS INDEX: 23.67 KG/M2 | DIASTOLIC BLOOD PRESSURE: 71 MMHG | WEIGHT: 133.6 LBS | TEMPERATURE: 98.5 F | OXYGEN SATURATION: 96 % | HEART RATE: 104 BPM | HEIGHT: 63 IN | RESPIRATION RATE: 16 BRPM | SYSTOLIC BLOOD PRESSURE: 102 MMHG

## 2024-07-01 DIAGNOSIS — B96.89 BV (BACTERIAL VAGINOSIS): ICD-10-CM

## 2024-07-01 DIAGNOSIS — Z23 NEED FOR HEPATITIS A IMMUNIZATION: ICD-10-CM

## 2024-07-01 DIAGNOSIS — N76.0 BV (BACTERIAL VAGINOSIS): ICD-10-CM

## 2024-07-01 DIAGNOSIS — S52.022S: ICD-10-CM

## 2024-07-01 DIAGNOSIS — Z09 HOSPITAL DISCHARGE FOLLOW-UP: Primary | ICD-10-CM

## 2024-07-01 DIAGNOSIS — Z23 NEED FOR HEPATITIS B VACCINATION: ICD-10-CM

## 2024-07-01 DIAGNOSIS — K70.31 ALCOHOLIC CIRRHOSIS OF LIVER WITH ASCITES (HCC): ICD-10-CM

## 2024-07-01 PROCEDURE — 1111F DSCHRG MED/CURRENT MED MERGE: CPT | Performed by: INTERNAL MEDICINE

## 2024-07-01 PROCEDURE — 99215 OFFICE O/P EST HI 40 MIN: CPT | Performed by: INTERNAL MEDICINE

## 2024-07-01 RX ORDER — SPIRONOLACTONE 50 MG/1
50 TABLET, FILM COATED ORAL DAILY
Qty: 30 TABLET | Refills: 1 | Status: SHIPPED | OUTPATIENT
Start: 2024-07-01

## 2024-07-01 RX ORDER — METRONIDAZOLE 7.5 MG/G
1 GEL VAGINAL DAILY
Qty: 5 EACH | Refills: 1 | Status: SHIPPED | OUTPATIENT
Start: 2024-07-01 | End: 2024-07-06

## 2024-07-01 RX ORDER — FOLIC ACID 1 MG/1
1 TABLET ORAL DAILY
Qty: 30 TABLET | Refills: 1
Start: 2024-07-01

## 2024-07-01 NOTE — PROGRESS NOTES
Post-Discharge Transitional Care  Follow Up      Shoshana Diego   YOB: 1985    Date of Office Visit:  7/1/2024  Date of Hospital Admission: 6/18/24  Date of Hospital Discharge: 6/18/24  Risk of hospital readmission (high >=14%. Medium >=10%) :Readmission Risk Score: 13.2      Care management risk score Rising risk (score 2-5) and Complex Care (Scores >=6): No Risk Score On File     Non face to face  following discharge, date last encounter closed (first attempt may have been earlier): 06/19/2024    Call initiated 2 business days of discharge: Yes    ASSESSMENT/PLAN:   Hospital discharge follow-up  -     [place] DISCHARGE MEDS RECONCILED W/ CURRENT OUTPATIENT MED LIST  Alcoholic cirrhosis of liver with ascites (HCC)  Complicated by recurrent ascites, polyneuropathy, and previous hepatic encephalopathy. Today has bilateral LE edema and increased abdominal girth. Continue furosemide 40 mg 1 tab daily and restart spironolactone 50 mg daily. Also restart folic acid and thiamine. Take the folic acid and thiamine for 30 days. Instructed to have hepatitis A and B vaccines at her pharmacy. Since cirrhosis increases risk of osteoporosis, a DEXA bone density study has been ordered as recommended by Dr. Rg.  -     Restart spironolactone (ALDACTONE) 50 MG tablet; Take 1 tablet by mouth daily, Disp-30 tablet, R-1Normal  -     Restart folic acid (FOLVITE) 1 MG tablet; Take 1 tablet by mouth daily, Disp-30 tablet, R-1NO PRINT  -     DEXA BONE DENSITY AXIAL SKELETON; Future  -     Given printed prescription for: Hepatitis A-Hep B Recomb Vac (TWINRIX) 720-20 ELU-MCG/ML AIDEE injection; Inject 1 mL into the muscle once for 1 dose Repeat in 1 month and 6 months., Disp-1 mL, R-0Print  Hypokalemia  Her K+ was low at 3.1 on 6/25/24. In addition to restarting spironolactone, continue KCl 10 meq daily. I will plan on rechecking K+ in 1 week.  Depression  Controlled. Continue trazodone 100 ng nightly for depression and

## 2024-07-01 NOTE — PROGRESS NOTES
Shoshana Diego  Identified pt with two pt identifiers(name and ).  Chief Complaint   Patient presents with    Follow-Up from Hospital       1. Have you been to the ER, urgent care clinic since your last visit?  Hospitalized since your last visit? [facility]    2. Have you seen or consulted any other health care providers outside of the [facility] since your last visit?  Include any pap smears or colon screening. NO      Provider notified of reason for visit, vitals and flowsheets obtained on patients.     Patient received paperwork for advance directive during previous visit but has not completed at this time     Reviewed record In preparation for visit, huddled with provider and have obtained necessary documentation      Health Maintenance Due   Topic    Hepatitis A vaccine (1 of 2 - Risk 2-dose series)    Cervical cancer screen        Wt Readings from Last 3 Encounters:   24 57.6 kg (127 lb)   24 60.3 kg (132 lb 15 oz)   24 59 kg (130 lb)     Temp Readings from Last 3 Encounters:   24 97.3 °F (36.3 °C) (Temporal)   24 97.9 °F (36.6 °C)   24 98.5 °F (36.9 °C) (Oral)     BP Readings from Last 3 Encounters:   24 103/66   24 (!) 103/58   24 96/63     Pulse Readings from Last 3 Encounters:   24 (!) 104   24 (!) 109   24 87          No data to display                  Learning Assessment:  :         2024    10:30 AM   [facility] AMB LEARNING ASSESSMENT   Primary Learner Patient   level of education GRADUATED HIGH SCHOOL OR GED   Primary Language ENGLISH   Learning Preference DEMONSTRATION   Answered By Patient   Relationship to Learner SELF       Fall Risk Assessment:  :          No data to display                Abuse Screening:  :          No data to display                ADL Screening:  :         2024    10:00 AM   ADL ASSESSMENT   Feeding yourself No Help Needed   Getting from bed to chair No Help Needed   Getting dressed No Help

## 2024-07-03 ENCOUNTER — APPOINTMENT (OUTPATIENT)
Facility: HOSPITAL | Age: 39
End: 2024-07-03
Attending: STUDENT IN AN ORGANIZED HEALTH CARE EDUCATION/TRAINING PROGRAM
Payer: MEDICAID

## 2024-07-03 ENCOUNTER — APPOINTMENT (OUTPATIENT)
Facility: HOSPITAL | Age: 39
End: 2024-07-03
Payer: MEDICAID

## 2024-07-03 ENCOUNTER — HOSPITAL ENCOUNTER (OUTPATIENT)
Facility: HOSPITAL | Age: 39
Setting detail: OBSERVATION
Discharge: LEFT BEFORE DISCHARGE | End: 2024-07-03
Attending: EMERGENCY MEDICINE | Admitting: STUDENT IN AN ORGANIZED HEALTH CARE EDUCATION/TRAINING PROGRAM
Payer: MEDICAID

## 2024-07-03 VITALS
TEMPERATURE: 97.9 F | RESPIRATION RATE: 16 BRPM | OXYGEN SATURATION: 94 % | DIASTOLIC BLOOD PRESSURE: 62 MMHG | HEART RATE: 87 BPM | SYSTOLIC BLOOD PRESSURE: 89 MMHG

## 2024-07-03 DIAGNOSIS — K70.31 ALCOHOLIC CIRRHOSIS OF LIVER WITH ASCITES (HCC): Primary | ICD-10-CM

## 2024-07-03 DIAGNOSIS — R06.02 SHORTNESS OF BREATH: ICD-10-CM

## 2024-07-03 LAB
ALBUMIN SERPL-MCNC: 2 G/DL (ref 3.5–5)
ALBUMIN/GLOB SERPL: 0.4 (ref 1.1–2.2)
ALP SERPL-CCNC: 180 U/L (ref 45–117)
ALT SERPL-CCNC: 43 U/L (ref 12–78)
ANION GAP SERPL CALC-SCNC: 11 MMOL/L (ref 5–15)
APPEARANCE FLD: ABNORMAL
AST SERPL-CCNC: 86 U/L (ref 15–37)
BASOPHILS # BLD: 0.1 K/UL (ref 0–0.1)
BASOPHILS NFR BLD: 1 % (ref 0–1)
BILIRUB SERPL-MCNC: 2.8 MG/DL (ref 0.2–1)
BUN SERPL-MCNC: 11 MG/DL (ref 6–20)
BUN/CREAT SERPL: 17 (ref 12–20)
CALCIUM SERPL-MCNC: 8 MG/DL (ref 8.5–10.1)
CHLORIDE SERPL-SCNC: 100 MMOL/L (ref 97–108)
CO2 SERPL-SCNC: 24 MMOL/L (ref 21–32)
COLOR FLD: YELLOW
COMMENT:: NORMAL
CREAT SERPL-MCNC: 0.65 MG/DL (ref 0.55–1.02)
DIFFERENTIAL METHOD BLD: ABNORMAL
EOSINOPHIL # BLD: 0.3 K/UL (ref 0–0.4)
EOSINOPHIL NFR BLD: 4 % (ref 0–7)
ERYTHROCYTE [DISTWIDTH] IN BLOOD BY AUTOMATED COUNT: 25.1 % (ref 11.5–14.5)
GLOBULIN SER CALC-MCNC: 5 G/DL (ref 2–4)
GLUCOSE SERPL-MCNC: 112 MG/DL (ref 65–100)
HCT VFR BLD AUTO: 26.4 % (ref 35–47)
HGB BLD-MCNC: 8.4 G/DL (ref 11.5–16)
IMM GRANULOCYTES # BLD AUTO: 0.1 K/UL (ref 0–0.04)
IMM GRANULOCYTES NFR BLD AUTO: 1 % (ref 0–0.5)
INR PPP: 1.2 (ref 0.9–1.1)
LYMPHOCYTES # BLD: 2.1 K/UL (ref 0.8–3.5)
LYMPHOCYTES NFR BLD: 24 % (ref 12–49)
LYMPHOCYTES NFR FLD: 11 %
MAGNESIUM SERPL-MCNC: 1.6 MG/DL (ref 1.6–2.4)
MCH RBC QN AUTO: 31.9 PG (ref 26–34)
MCHC RBC AUTO-ENTMCNC: 31.8 G/DL (ref 30–36.5)
MCV RBC AUTO: 100.4 FL (ref 80–99)
MESOTHL CELL NFR FLD: 6 %
MONOCYTES # BLD: 1.1 K/UL (ref 0–1)
MONOCYTES NFR BLD: 13 % (ref 5–13)
MONOS+MACROS NFR FLD: 81 %
NEUTROPHILS NFR FLD: 2 %
NEUTS SEG # BLD: 5 K/UL (ref 1.8–8)
NEUTS SEG NFR BLD: 57 % (ref 32–75)
NRBC # BLD: 0 K/UL (ref 0–0.01)
NRBC BLD-RTO: 0 PER 100 WBC
NUC CELL # FLD: 139 /CU MM
PLATELET # BLD AUTO: 154 K/UL (ref 150–400)
PMV BLD AUTO: 9.8 FL (ref 8.9–12.9)
POTASSIUM SERPL-SCNC: 2.4 MMOL/L (ref 3.5–5.1)
PROT SERPL-MCNC: 7 G/DL (ref 6.4–8.2)
PROTHROMBIN TIME: 12.3 SEC (ref 9–11.1)
RBC # BLD AUTO: 2.63 M/UL (ref 3.8–5.2)
RBC # FLD: 39 /CU MM
RBC MORPH BLD: ABNORMAL
SODIUM SERPL-SCNC: 135 MMOL/L (ref 136–145)
SPECIMEN HOLD: NORMAL
SPECIMEN SOURCE FLD: ABNORMAL
WBC # BLD AUTO: 8.7 K/UL (ref 3.6–11)

## 2024-07-03 PROCEDURE — 96376 TX/PRO/DX INJ SAME DRUG ADON: CPT

## 2024-07-03 PROCEDURE — G0378 HOSPITAL OBSERVATION PER HR: HCPCS

## 2024-07-03 PROCEDURE — 96375 TX/PRO/DX INJ NEW DRUG ADDON: CPT

## 2024-07-03 PROCEDURE — 6360000002 HC RX W HCPCS: Performed by: EMERGENCY MEDICINE

## 2024-07-03 PROCEDURE — 87070 CULTURE OTHR SPECIMN AEROBIC: CPT

## 2024-07-03 PROCEDURE — 96366 THER/PROPH/DIAG IV INF ADDON: CPT

## 2024-07-03 PROCEDURE — 96368 THER/DIAG CONCURRENT INF: CPT

## 2024-07-03 PROCEDURE — 85025 COMPLETE CBC W/AUTO DIFF WBC: CPT

## 2024-07-03 PROCEDURE — 6360000002 HC RX W HCPCS: Performed by: STUDENT IN AN ORGANIZED HEALTH CARE EDUCATION/TRAINING PROGRAM

## 2024-07-03 PROCEDURE — P9047 ALBUMIN (HUMAN), 25%, 50ML: HCPCS | Performed by: EMERGENCY MEDICINE

## 2024-07-03 PROCEDURE — 99285 EMERGENCY DEPT VISIT HI MDM: CPT

## 2024-07-03 PROCEDURE — 71045 X-RAY EXAM CHEST 1 VIEW: CPT

## 2024-07-03 PROCEDURE — 36415 COLL VENOUS BLD VENIPUNCTURE: CPT

## 2024-07-03 PROCEDURE — 85610 PROTHROMBIN TIME: CPT

## 2024-07-03 PROCEDURE — 2709999900 US GUIDED PARACENTESIS

## 2024-07-03 PROCEDURE — 6370000000 HC RX 637 (ALT 250 FOR IP): Performed by: STUDENT IN AN ORGANIZED HEALTH CARE EDUCATION/TRAINING PROGRAM

## 2024-07-03 PROCEDURE — 96365 THER/PROPH/DIAG IV INF INIT: CPT

## 2024-07-03 PROCEDURE — 87205 SMEAR GRAM STAIN: CPT

## 2024-07-03 PROCEDURE — P9047 ALBUMIN (HUMAN), 25%, 50ML: HCPCS | Performed by: STUDENT IN AN ORGANIZED HEALTH CARE EDUCATION/TRAINING PROGRAM

## 2024-07-03 PROCEDURE — 83735 ASSAY OF MAGNESIUM: CPT

## 2024-07-03 PROCEDURE — 80053 COMPREHEN METABOLIC PANEL: CPT

## 2024-07-03 PROCEDURE — 96367 TX/PROPH/DG ADDL SEQ IV INF: CPT

## 2024-07-03 PROCEDURE — 93005 ELECTROCARDIOGRAM TRACING: CPT | Performed by: EMERGENCY MEDICINE

## 2024-07-03 PROCEDURE — 89050 BODY FLUID CELL COUNT: CPT

## 2024-07-03 PROCEDURE — 2580000003 HC RX 258: Performed by: STUDENT IN AN ORGANIZED HEALTH CARE EDUCATION/TRAINING PROGRAM

## 2024-07-03 RX ORDER — SPIRONOLACTONE 25 MG/1
50 TABLET ORAL DAILY
Status: DISCONTINUED | OUTPATIENT
Start: 2024-07-03 | End: 2024-07-03 | Stop reason: HOSPADM

## 2024-07-03 RX ORDER — FENTANYL CITRATE 50 UG/ML
50 INJECTION, SOLUTION INTRAMUSCULAR; INTRAVENOUS ONCE
Status: COMPLETED | OUTPATIENT
Start: 2024-07-03 | End: 2024-07-03

## 2024-07-03 RX ORDER — SODIUM CHLORIDE 0.9 % (FLUSH) 0.9 %
5-40 SYRINGE (ML) INJECTION PRN
Status: DISCONTINUED | OUTPATIENT
Start: 2024-07-03 | End: 2024-07-03 | Stop reason: HOSPADM

## 2024-07-03 RX ORDER — POTASSIUM CHLORIDE 7.45 MG/ML
10 INJECTION INTRAVENOUS
Status: DISPENSED | OUTPATIENT
Start: 2024-07-03 | End: 2024-07-03

## 2024-07-03 RX ORDER — POTASSIUM CHLORIDE 7.45 MG/ML
10 INJECTION INTRAVENOUS ONCE
Status: COMPLETED | OUTPATIENT
Start: 2024-07-03 | End: 2024-07-03

## 2024-07-03 RX ORDER — SODIUM CHLORIDE 0.9 % (FLUSH) 0.9 %
5-40 SYRINGE (ML) INJECTION EVERY 12 HOURS SCHEDULED
Status: DISCONTINUED | OUTPATIENT
Start: 2024-07-03 | End: 2024-07-03 | Stop reason: HOSPADM

## 2024-07-03 RX ORDER — ONDANSETRON 4 MG/1
4 TABLET, ORALLY DISINTEGRATING ORAL EVERY 8 HOURS PRN
Status: DISCONTINUED | OUTPATIENT
Start: 2024-07-03 | End: 2024-07-03 | Stop reason: HOSPADM

## 2024-07-03 RX ORDER — PREGABALIN 75 MG/1
75 CAPSULE ORAL 2 TIMES DAILY
Status: DISCONTINUED | OUTPATIENT
Start: 2024-07-03 | End: 2024-07-03 | Stop reason: HOSPADM

## 2024-07-03 RX ORDER — SODIUM CHLORIDE 9 MG/ML
INJECTION, SOLUTION INTRAVENOUS PRN
Status: DISCONTINUED | OUTPATIENT
Start: 2024-07-03 | End: 2024-07-03 | Stop reason: HOSPADM

## 2024-07-03 RX ORDER — POLYETHYLENE GLYCOL 3350 17 G
2 POWDER IN PACKET (EA) ORAL
Status: DISCONTINUED | OUTPATIENT
Start: 2024-07-03 | End: 2024-07-03 | Stop reason: HOSPADM

## 2024-07-03 RX ORDER — ACETAMINOPHEN 650 MG/1
650 SUPPOSITORY RECTAL EVERY 6 HOURS PRN
Status: DISCONTINUED | OUTPATIENT
Start: 2024-07-03 | End: 2024-07-03 | Stop reason: HOSPADM

## 2024-07-03 RX ORDER — MIDODRINE HYDROCHLORIDE 5 MG/1
5 TABLET ORAL EVERY 8 HOURS PRN
Status: DISCONTINUED | OUTPATIENT
Start: 2024-07-03 | End: 2024-07-03 | Stop reason: HOSPADM

## 2024-07-03 RX ORDER — ALBUMIN (HUMAN) 12.5 G/50ML
25 SOLUTION INTRAVENOUS ONCE
Status: COMPLETED | OUTPATIENT
Start: 2024-07-03 | End: 2024-07-03

## 2024-07-03 RX ORDER — OXYCODONE HYDROCHLORIDE 5 MG/1
5 TABLET ORAL EVERY 4 HOURS PRN
Status: DISCONTINUED | OUTPATIENT
Start: 2024-07-03 | End: 2024-07-03 | Stop reason: HOSPADM

## 2024-07-03 RX ORDER — ACETAMINOPHEN 325 MG/1
650 TABLET ORAL EVERY 6 HOURS PRN
Status: DISCONTINUED | OUTPATIENT
Start: 2024-07-03 | End: 2024-07-03 | Stop reason: HOSPADM

## 2024-07-03 RX ORDER — MAGNESIUM SULFATE IN WATER 40 MG/ML
2000 INJECTION, SOLUTION INTRAVENOUS ONCE
Status: COMPLETED | OUTPATIENT
Start: 2024-07-03 | End: 2024-07-03

## 2024-07-03 RX ORDER — POLYETHYLENE GLYCOL 3350 17 G/17G
17 POWDER, FOR SOLUTION ORAL DAILY PRN
Status: DISCONTINUED | OUTPATIENT
Start: 2024-07-03 | End: 2024-07-03 | Stop reason: HOSPADM

## 2024-07-03 RX ORDER — FOLIC ACID 1 MG/1
1 TABLET ORAL DAILY
Status: DISCONTINUED | OUTPATIENT
Start: 2024-07-03 | End: 2024-07-03 | Stop reason: HOSPADM

## 2024-07-03 RX ORDER — ONDANSETRON 2 MG/ML
4 INJECTION INTRAMUSCULAR; INTRAVENOUS EVERY 6 HOURS PRN
Status: DISCONTINUED | OUTPATIENT
Start: 2024-07-03 | End: 2024-07-03 | Stop reason: HOSPADM

## 2024-07-03 RX ORDER — FUROSEMIDE 40 MG/1
40 TABLET ORAL DAILY
Status: DISCONTINUED | OUTPATIENT
Start: 2024-07-03 | End: 2024-07-03 | Stop reason: HOSPADM

## 2024-07-03 RX ORDER — GAUZE BANDAGE 2" X 2"
100 BANDAGE TOPICAL DAILY
Status: DISCONTINUED | OUTPATIENT
Start: 2024-07-03 | End: 2024-07-03 | Stop reason: HOSPADM

## 2024-07-03 RX ADMIN — POTASSIUM CHLORIDE 10 MEQ: 7.46 INJECTION, SOLUTION INTRAVENOUS at 04:42

## 2024-07-03 RX ADMIN — MIDODRINE HYDROCHLORIDE 5 MG: 5 TABLET ORAL at 06:41

## 2024-07-03 RX ADMIN — SODIUM CHLORIDE, PRESERVATIVE FREE 10 ML: 5 INJECTION INTRAVENOUS at 11:14

## 2024-07-03 RX ADMIN — FOLIC ACID 1 MG: 1 TABLET ORAL at 11:05

## 2024-07-03 RX ADMIN — POTASSIUM CHLORIDE 10 MEQ: 7.46 INJECTION, SOLUTION INTRAVENOUS at 06:34

## 2024-07-03 RX ADMIN — POTASSIUM CHLORIDE 10 MEQ: 7.46 INJECTION, SOLUTION INTRAVENOUS at 07:51

## 2024-07-03 RX ADMIN — POTASSIUM BICARBONATE 40 MEQ: 782 TABLET, EFFERVESCENT ORAL at 05:49

## 2024-07-03 RX ADMIN — MAGNESIUM SULFATE HEPTAHYDRATE 2000 MG: 40 INJECTION, SOLUTION INTRAVENOUS at 05:54

## 2024-07-03 RX ADMIN — PREGABALIN 75 MG: 75 CAPSULE ORAL at 11:05

## 2024-07-03 RX ADMIN — ALBUMIN (HUMAN) 25 G: 0.25 INJECTION, SOLUTION INTRAVENOUS at 09:23

## 2024-07-03 RX ADMIN — Medication 100 MG: at 11:05

## 2024-07-03 RX ADMIN — ALBUMIN (HUMAN) 25 G: 0.25 INJECTION, SOLUTION INTRAVENOUS at 04:42

## 2024-07-03 RX ADMIN — FENTANYL CITRATE 50 MCG: 50 INJECTION INTRAMUSCULAR; INTRAVENOUS at 03:39

## 2024-07-03 ASSESSMENT — PAIN DESCRIPTION - ONSET
ONSET: ON-GOING

## 2024-07-03 ASSESSMENT — PAIN SCALES - GENERAL
PAINLEVEL_OUTOF10: 10
PAINLEVEL_OUTOF10: 8
PAINLEVEL_OUTOF10: 5
PAINLEVEL_OUTOF10: 10
PAINLEVEL_OUTOF10: 0
PAINLEVEL_OUTOF10: 8
PAINLEVEL_OUTOF10: 0

## 2024-07-03 ASSESSMENT — PAIN - FUNCTIONAL ASSESSMENT
PAIN_FUNCTIONAL_ASSESSMENT: PREVENTS OR INTERFERES WITH ALL ACTIVE AND SOME PASSIVE ACTIVITIES
PAIN_FUNCTIONAL_ASSESSMENT: PREVENTS OR INTERFERES SOME ACTIVE ACTIVITIES AND ADLS
PAIN_FUNCTIONAL_ASSESSMENT: 0-10
PAIN_FUNCTIONAL_ASSESSMENT: PREVENTS OR INTERFERES WITH ALL ACTIVE AND SOME PASSIVE ACTIVITIES

## 2024-07-03 ASSESSMENT — PAIN DESCRIPTION - LOCATION
LOCATION: ABDOMEN;BACK

## 2024-07-03 ASSESSMENT — PAIN DESCRIPTION - ORIENTATION
ORIENTATION: RIGHT
ORIENTATION: RIGHT;UPPER
ORIENTATION: RIGHT

## 2024-07-03 ASSESSMENT — PAIN DESCRIPTION - PAIN TYPE
TYPE: CHRONIC PAIN
TYPE: CHRONIC PAIN;ACUTE PAIN

## 2024-07-03 ASSESSMENT — LIFESTYLE VARIABLES
HOW MANY STANDARD DRINKS CONTAINING ALCOHOL DO YOU HAVE ON A TYPICAL DAY: PATIENT DOES NOT DRINK
HOW OFTEN DO YOU HAVE A DRINK CONTAINING ALCOHOL: NEVER

## 2024-07-03 ASSESSMENT — PAIN DESCRIPTION - FREQUENCY
FREQUENCY: CONTINUOUS

## 2024-07-03 ASSESSMENT — PAIN DESCRIPTION - DESCRIPTORS
DESCRIPTORS: SHOOTING
DESCRIPTORS: SHOOTING

## 2024-07-03 NOTE — DISCHARGE SUMMARY
Discharge Summary    Name: Shoshana Diego  [id number]  YOB: 1985 (Age: 39 y.o.)   Date of Admission: 7/3/2024  Date of Discharge: 7/3/2024  Attending Physician: No att. providers found    Discharge Diagnosis:     Patient left AGAINST MEDICAL ADVICE  Decompensated stated alcoholic cirrhosis with large volume ascites  Alcoholism with ongoing alcohol consumption  Tobacco abuse  Moderate hypokalemia  Borderline hypomagnesemia  Chronic macrocytic anemia-anemia of chronic disease       Consultations:  None      Brief Admission History/Reason for Admission Per Zia Field, DO:   Shoshana Diego is a 39 y.o.  female with PMHx as listed below presenting to the emergency department with complaints of progressively worsening abdominal distention and pain without peritoneal signs.  Denies fever/chills/shakes.  Has known decompensated alcoholic cirrhosis followed by Dr. Julio in the liver [place] currently taking spironolactone and Lasix.  Reports she has been doing fairly well avoiding alcohol, but slipped up and drank 2 alcoholic beverages today in an attempt to tolerate the abdominal pain.  States that she has been having increasing volumes of ascites and more frequent paracenteses recently and is in the process of being scheduled for every 2-week paracentesis through the liver [place].  States she cannot tolerate it anymore and came to the emergency department out of desperation to have ascitic fluid removed.  ROS otherwise negative.  Admits to smoking 1 pack/day.  Alcohol consumption as noted above.  Reports occasional marijuana use.  Not currently being considered for transplant candidacy per patient.       Brief Hospital Course by Main Problems:     Patient was admitted hospital noted to have acute decompensated ascites due to cirrhosis of the liver.  She also had chronic alcoholism with ongoing alcohol consumption.  She was also noted severe hypokalemia and

## 2024-07-03 NOTE — CARE COORDINATION
RUR: N/A   Readmission? Yes  IM? N/A - patient has Medicaid   ? N/A    Transition of Care Plan: home with outpatient services unless otherwise recommended by medical team.    CM met with pt at bedside to discuss discharge plan and complete initial case management assessment. Pt told CM that she is planning to drive herself home from the hospital.     Pharmacy: [id number] - [address] [phone] [facility] [phone] [[id number]]     Pt lives alone at the address listed on her facesheet. Pt has a cane at home and reports that she is independent in her ADLs/IADLs. Pt does not have a hx of home O2, home health services, or a stay at a SNF for rehab services. Pt does not feel that she needs any other resources and/or services at home or in the community at this time.    Case management to follow for discharge planning. No current CM needs identified.      07/03/24 1055   Service Assessment   Patient Orientation Alert and Oriented   Cognition Alert   History Provided By Patient   Primary Caregiver Self   Accompanied By/Relationship None   Support Systems Friends/Neighbors;Parent   Patient's Healthcare Decision Maker is: Legal Next of Kin  (Father, Mingo Diego, [phone] and [phone])   PCP Verified by CM Yes  (Yee Johnston MD)   Last Visit to PCP Within last 3 months   Prior Functional Level Independent in ADLs/IADLs   Current Functional Level Independent in ADLs/IADLs   Can patient return to prior living arrangement Yes   Ability to make needs known: Good   Family able to assist with home care needs: No  (Patient lives alone, but has a boyfriend that is over sometimes.)   Would you like for me to discuss the discharge plan with any other family members/significant others, and if so, who? No  (Patient did not specify)   Financial Resources Medicaid   Community Resources None   CM/SW Referral Other (see comment)  (Discharge planning: assessment of discharge needs, outpatient  follow up)   Social/Functional History   Lives With Alone   Type of Home House   Discharge Planning   Type of Residence House   Living Arrangements Alone   Current Services Prior To Admission Durable Medical Equipment   Current DME Prior to Arrival Cane   Potential Assistance Needed Other (Comment)  (Discharge planning: assessment of discharge needs, outpatient follow up)   DME Ordered? No   Potential Assistance Purchasing Medications No   Type of Home Care Services None   Patient expects to be discharged to: House   Follow Up Appointment: Best Day/Time    (Early mornings or afternoons)   One/Two Story Residence One story  (Steps at front of the house. Pt holds the rails and takes the steps slowly.)   History of falls? 1  (\"Sometimes\" when struggling with neuropathy. No new falls since last admission.)   Services At/After Discharge   Transition of Care Consult (CM Consult) Discharge Planning   Services At/After Discharge Outpatient    Resource Information Provided? No   Mode of Transport at Discharge Self   Confirm Follow Up Transport Self   Condition of Participation: Discharge Planning   The Plan for Transition of Care is related to the following treatment goals: Discharge plan: home with outpatient services. Patient is planning to drive herself home from the hospital.   The Patient and/or Patient Representative was provided with a Choice of Provider? Patient   The Patient and/Or Patient Representative agree with the Discharge Plan? Yes  (TBD)   Freedom of Choice list was provided with basic dialogue that supports the patient's individualized plan of care/goals, treatment preferences, and shares the quality data associated with the providers?  Yes  (verbal discussion as applicable)     Advance Care Planning     General Advance Care Planning (ACP) Conversation    Date of Conversation: 7/3/2024  Conducted with: Patient with Decision Making Capacity  Other persons present: None    Healthcare Decision Maker:

## 2024-07-03 NOTE — PLAN OF CARE
Problem: Pain  Goal: Verbalizes/displays adequate comfort level or baseline comfort level  Flowsheets (Taken 7/3/2024 4499)  Verbalizes/displays adequate comfort level or baseline comfort level:   Encourage patient to monitor pain and request assistance   Assess pain using appropriate pain scale   Administer analgesics based on type and severity of pain and evaluate response   Implement non-pharmacological measures as appropriate and evaluate response   Consider cultural and social influences on pain and pain management   Notify Licensed Independent Practitioner if interventions unsuccessful or patient reports new pain   Patient admitted from ED at 0634 patient is alert x4 but anxious. Bp low 99/s midodrine was given.

## 2024-07-03 NOTE — CARE COORDINATION
RUR: N/A      07/03/24 1058   Readmission Assessment   Number of Days since last admission? 8-30 days   Previous Disposition Home Alone   Who is being Interviewed Patient   What was the patient's/caregiver's perception as to why they think they needed to return back to the hospital? Other (Comment)  (New onset of symptoms)   Did you visit your Primary Care Physician after you left the hospital, before you returned this time? Yes   Did you see a specialist, such as Cardiac, Pulmonary, Orthopedic Physician, etc. after you left the hospital? Yes  (Liver specialist)   Who advised the patient to return to the hospital? Self-referral   Does the patient report anything that got in the way of taking their medications? No   In our efforts to provide the best possible care to you and others like you, can you think of anything that we could have done to help you after you left the hospital the first time, so that you might not have needed to return so soon? Other (Comment)  (Patient does not feel that anything could have been done differently by the [facility] staff during her last hospitalization.)     Full CM assessment to follow.    Carley Villanueva LMSW,   [phone]

## 2024-07-03 NOTE — H&P
Hospitalist Admission Note    NAME:  Shoshana Diego   :  1985   MRN:  [id number]     Date/Time:  7/3/2024 5:05 AM    Patient PCP: Yee Johnston MD    ______________________________________________________________________  Given the patient's current clinical presentation, I have a high level of concern for decompensation if discharged from the emergency department.  Complex decision making was performed, which includes reviewing the patient's available past medical records, laboratory results, and x-ray films.       My assessment of this patient's clinical condition and my plan of care is as follows.    Assessment / Plan:    Active Problems:  Decompensated stated alcoholic cirrhosis with large volume ascites  Alcoholism with ongoing alcohol consumption  Tobacco abuse  Moderate hypokalemia  Borderline hypomagnesemia  Chronic macrocytic anemia-anemia of chronic disease    Plan:  Decompensated stated alcoholic cirrhosis with large volume ascites  Alcoholism with ongoing alcohol consumption  Admit to medical observation  Counseled on total abstinence from alcohol  Continue PTA spironolactone and furosemide  Albumin now for borderline hypotension  As needed midodrine  Interventional radiology consulted for large-volume therapeutic paracentesis  -Fluid studies ordered  Patient already established with liver [place] who is in process of scheduling patient for every 2-week paracenteses to avoid further hospital encounters.    Tobacco abuse  3 minutes tobacco cessation counseling provided with emphasis on adverse cardiopulmonary effects of ongoing tobacco abuse.  Will provide nicotine lozenges as desired.    Moderate hypokalemia  Borderline hypomagnesemia  IV magnesium replacement ordered-trend and replace further as indicated  IV and p.o. potassium replacement ordered-trended replace further as indicated    Chronic macrocytic anemia-anemia of chronic disease  Trend hemoglobin  Continue vitamin

## 2024-07-03 NOTE — PROGRESS NOTES
1103: Patient returned from X-ray recovery post Paracentesis. Reassessment, vitals and medications administered. PO lasix and Spirolactone per Dr. Darden held for blood pressure 89/62 and HR 87. Patient stating that she wanted to go home now that her paracentesis was completed. Randi Served Dr. Darden.    1111: Patient now stating that she wants to leave prior to Dr. Darden seeing her because she \"feels better.\" She also stated that she \"got her paracentesis and that's the only reason I came.\" Randi Served Dr. Darden again regarding patient's desire to now leave AMA.     1123: Patient and this RN signed AMA form and Dr. Darden at bedside. Patient continues to state that she wants to go home despite risks of leaving AMA and states that she understands the risks of leaving. Dr. Darden signed AMA form.    1200: IV's removed and patient walked downstairs with PCT. Patient drove herself home.    Cheli Ferrer RN

## 2024-07-03 NOTE — PLAN OF CARE
Patient left AMA.    Problem: Pain  Goal: Verbalizes/displays adequate comfort level or baseline comfort level  7/3/2024 1554 by Cheli Ferrer, RN  Outcome: HH/HSPC Resolved Not Met  7/3/2024 0655 by Bijal Trimble RN  Flowsheets (Taken 7/3/2024 0655)  Verbalizes/displays adequate comfort level or baseline comfort level:   Encourage patient to monitor pain and request assistance   Assess pain using appropriate pain scale   Administer analgesics based on type and severity of pain and evaluate response   Implement non-pharmacological measures as appropriate and evaluate response   Consider cultural and social influences on pain and pain management   Notify Licensed Independent Practitioner if interventions unsuccessful or patient reports new pain

## 2024-07-03 NOTE — PROGRESS NOTES
1103: Patient returned from X-ray recovery post Paracentesis. Reassessment, vitals and medications administered. PO lasix and Spi

## 2024-07-03 NOTE — PROGRESS NOTES
Patient seen and examined this morning.  Patient wanted to be discharged.  I talked to the patient about the need for further hospitalization including management of hypotension and also hypokalemia complications from hypotension and also severe hypokalemia.  Patient told me that she has something very important and wanted me to.  She wanted to leave AGAINST MEDICAL ADVICE.  I told her the complications of leaving AGAINST MEDICAL ADVICE including serious injury or death.  I told her about the risks of denial of insurance coverage if she leaves AGAINST MEDICAL ADVICE but she told me that she does understand both of the above risks and wanted to leave venous medical advice.  Nurse Duran was present during the entire conversation.

## 2024-07-03 NOTE — PROGRESS NOTES
Name of procedure: Paracentesis    Vital Signs: Baseline    Fluids removed: 3400 ml clear yellow fluid    Samples sent to lab: YES    Any complications related to procedure: NO    Post Procedure Care Needed/order sets placed in [facility].

## 2024-07-06 PROBLEM — K70.31 ASCITES DUE TO ALCOHOLIC CIRRHOSIS (HCC): Status: RESOLVED | Noted: 2024-07-03 | Resolved: 2024-07-06

## 2024-07-06 PROBLEM — R19.7 DIARRHEA, UNSPECIFIED: Status: RESOLVED | Noted: 2024-06-26 | Resolved: 2024-07-06

## 2024-07-06 PROBLEM — K70.31 ALCOHOLIC CIRRHOSIS OF LIVER WITH ASCITES (HCC): Status: RESOLVED | Noted: 2024-06-26 | Resolved: 2024-07-06

## 2024-07-06 PROBLEM — K72.90 DECOMPENSATION OF CIRRHOSIS OF LIVER (HCC): Status: RESOLVED | Noted: 2024-06-18 | Resolved: 2024-07-06

## 2024-07-06 PROBLEM — K76.9 LIVER DISEASE: Status: RESOLVED | Noted: 2024-06-26 | Resolved: 2024-07-06

## 2024-07-06 PROBLEM — R10.84 GENERALIZED ABDOMINAL PAIN: Status: RESOLVED | Noted: 2024-06-26 | Resolved: 2024-07-06

## 2024-07-06 PROBLEM — K74.60 DECOMPENSATION OF CIRRHOSIS OF LIVER (HCC): Status: RESOLVED | Noted: 2024-06-18 | Resolved: 2024-07-06

## 2024-07-06 LAB
EKG ATRIAL RATE: 105 BPM
EKG DIAGNOSIS: NORMAL
EKG P AXIS: 53 DEGREES
EKG P-R INTERVAL: 156 MS
EKG Q-T INTERVAL: 350 MS
EKG QRS DURATION: 80 MS
EKG QTC CALCULATION (BAZETT): 462 MS
EKG R AXIS: 20 DEGREES
EKG T AXIS: 33 DEGREES
EKG VENTRICULAR RATE: 105 BPM

## 2024-07-07 LAB
BACTERIA SPEC CULT: NORMAL
GRAM STN SPEC: NORMAL
SERVICE CMNT-IMP: NORMAL

## 2024-07-08 NOTE — ED PROVIDER NOTES
Heart Disease Maternal Grandmother     Heart Disease Maternal Grandfather     Heart Disease Maternal Uncle     No Known Problems Father        Social History:  Social History     Tobacco Use    Smoking status: Every Day     Current packs/day: 1.00     Average packs/day: 1 pack/day for 15.0 years (15.0 ttl pk-yrs)     Types: Cigarettes    Smokeless tobacco: Never   Vaping Use    Vaping Use: Never used   Substance Use Topics    Alcohol use: Yes     Comment: 2-3 per day for many years    Drug use: No       Allergies:  No Known Allergies      SOCIAL DETERMINANTS OF HEALTH:  Social Determinants of Health     Tobacco Use: High Risk (7/3/2024)    Patient History     Smoking Tobacco Use: Every Day     Smokeless Tobacco Use: Never     Passive Exposure: Not on file   Alcohol Use: Not At Risk (7/3/2024)    AUDIT-C     Frequency of Alcohol Consumption: Never     Average Number of Drinks: Patient does not drink     Frequency of Binge Drinking: Never   Financial Resource Strain: Medium Risk (3/5/2024)    Overall Financial Resource Strain (CARDIA)     Difficulty of Paying Living Expenses: Somewhat hard   Food Insecurity: No Food Insecurity (5/2/2024)    Hunger Vital Sign     Worried About Running Out of Food in the Last Year: Never true     Ran Out of Food in the Last Year: Never true   Transportation Needs: No Transportation Needs (5/2/2024)    PRAPARE - Transportation     Lack of Transportation (Medical): No     Lack of Transportation (Non-Medical): No   Physical Activity: Not on file   Stress: Not on file   Social Connections: Not on file   Intimate Partner Violence: Not on file   Depression: Not at risk (6/25/2024)    PHQ-2     PHQ-2 Score: 2   Housing Stability: Low Risk  (5/2/2024)    Housing Stability Vital Sign     Unable to Pay for Housing in the Last Year: No     Number of Places Lived in the Last Year: 1     Unstable Housing in the Last Year: No   Interpersonal Safety: Not At Risk (6/17/2024)    Interpersonal Safety

## 2024-07-15 ENCOUNTER — HOSPITAL ENCOUNTER (OUTPATIENT)
Facility: HOSPITAL | Age: 39
Discharge: HOME | End: 2024-07-18
Attending: INTERNAL MEDICINE
Payer: MEDICAID

## 2024-07-15 VITALS
DIASTOLIC BLOOD PRESSURE: 62 MMHG | OXYGEN SATURATION: 96 % | SYSTOLIC BLOOD PRESSURE: 96 MMHG | HEART RATE: 98 BPM | RESPIRATION RATE: 20 BRPM

## 2024-07-15 DIAGNOSIS — K70.31 ASCITES DUE TO ALCOHOLIC CIRRHOSIS (HCC): ICD-10-CM

## 2024-07-15 PROCEDURE — 76705 ECHO EXAM OF ABDOMEN: CPT

## 2024-07-15 ASSESSMENT — ENCOUNTER SYMPTOMS
WHEEZING: 0
VOMITING: 0
CONSTIPATION: 0
ABDOMINAL DISTENTION: 1
COUGH: 0
DIARRHEA: 0
BACK PAIN: 0
ABDOMINAL PAIN: 0
COLOR CHANGE: 0
NAUSEA: 0
SHORTNESS OF BREATH: 1

## 2024-07-15 ASSESSMENT — PAIN SCALES - GENERAL: PAINLEVEL_OUTOF10: 0

## 2024-07-15 NOTE — PROGRESS NOTES
0800: Patient arrived ambulatory to [facility]. Patient is A&Ox4 on RA in NAD at this time. Code status and allergies reviewed with patient prior to paracentesis.    0835: NP Trump at bedside.  Patient does not have enough fluid to safely drain for paracentesis at this time.  Patient put on schedule for next Monday, July 22 at 1000.

## 2024-07-26 ENCOUNTER — TELEPHONE (OUTPATIENT)
Age: 39
End: 2024-07-26

## 2024-07-26 NOTE — TELEPHONE ENCOUNTER
Patient called requesting a refill for Oxycondone.    7/26/24@8579 Patient canceled appt today in office.(KF)

## 2024-08-02 ENCOUNTER — PATIENT MESSAGE (OUTPATIENT)
Age: 39
End: 2024-08-02

## 2024-08-02 DIAGNOSIS — R52 PAIN: Primary | ICD-10-CM

## 2024-08-02 DIAGNOSIS — K70.31 ALCOHOLIC CIRRHOSIS OF LIVER WITH ASCITES (HCC): ICD-10-CM

## 2024-08-02 NOTE — TELEPHONE ENCOUNTER
From: Shoshana Diego  To: Dr. Tang Rg  Sent: 8/2/2024 12:15 PM EDT  Subject: Liver Patient    Hi, I was scheduled last week on July 26 but your  called and said it was going to be a 2 hour wait. Unfortunately I could not make it because of that. I've been waiting on a call to reschedule but haven't heard back. I also asked for a prescription refill for my Oxycodone. And I also need to be drained again .  Thank you, Shoshana

## 2024-08-05 ENCOUNTER — HOSPITAL ENCOUNTER (OUTPATIENT)
Facility: HOSPITAL | Age: 39
Discharge: HOME | End: 2024-08-08
Attending: INTERNAL MEDICINE
Payer: MEDICAID

## 2024-08-05 VITALS
OXYGEN SATURATION: 96 % | HEART RATE: 107 BPM | TEMPERATURE: 98.5 F | SYSTOLIC BLOOD PRESSURE: 127 MMHG | DIASTOLIC BLOOD PRESSURE: 67 MMHG

## 2024-08-05 DIAGNOSIS — R52 PAIN: ICD-10-CM

## 2024-08-05 DIAGNOSIS — K70.31 ALCOHOLIC CIRRHOSIS OF LIVER WITH ASCITES (HCC): ICD-10-CM

## 2024-08-05 PROCEDURE — 76705 ECHO EXAM OF ABDOMEN: CPT

## 2024-08-05 RX ORDER — OXYCODONE HYDROCHLORIDE 5 MG/1
5 TABLET ORAL
Qty: 30 TABLET | Refills: 0 | OUTPATIENT
Start: 2024-08-05 | End: 2024-09-04

## 2024-08-05 NOTE — TELEPHONE ENCOUNTER
PCP: Yee Johnston MD     Last appt:  7/1/2024    No future appointments.       Requested Prescriptions     Pending Prescriptions Disp Refills    oxyCODONE (ROXICODONE) 5 MG immediate release tablet 30 tablet 0     Sig: Take 1 tablet by mouth nightly for 30 days. Intended supply: 3 days. Take lowest dose possible to manage pain Max Daily Amount: 5 mg

## 2024-08-05 NOTE — TELEPHONE ENCOUNTER
Caller requests Refill of:    Oxycodone, Dr Carlton, liver specialist, said pt needed to get refills from pcp from now on     Please send to:    Kaylie Hankins     Visit Appointment History:  Future Appointments:  No future appointments.      Last Appointment With Me:  7/1/2024         Caller confirmed instructions and dosages as correct.    Caller was advised that Meds will be refilled as soon as possible, however there can be a 48-72 business hour turn around on refill requests.

## 2024-08-05 NOTE — PROGRESS NOTES
1035: pt arrived to [facility] recovery ambulatory. Friend in waiting area. Pt is in no distress, vss and aox4. Pt ambulated to restroom independently. Ultrasound at bedside.     1047: pt has no fluid per Cheryl in ultrasound. Shannan to confirm.     1055: pt walked out after confirmation there is no fluid from provider

## 2024-08-06 ENCOUNTER — PATIENT MESSAGE (OUTPATIENT)
Age: 39
End: 2024-08-06

## 2024-08-06 ENCOUNTER — TELEPHONE (OUTPATIENT)
Age: 39
End: 2024-08-06

## 2024-08-06 ENCOUNTER — TELEPHONE (OUTPATIENT)
Facility: CLINIC | Age: 39
End: 2024-08-06

## 2024-08-06 NOTE — TELEPHONE ENCOUNTER
----- Message from Yocasta Pinto RN sent at 8/2/2024  1:17 PM EDT -----  Regarding: FW: Liver Patient  Contact: [phone]  Hi ladies, patient cancelled her appt with [facility] last week - can you assist her to re-schedule? I have entered her order for paracentesis - Dr. Rg will have to order pain meds if needed. Thanks!    Yocasta  ----- Message -----  From: Shoshana Diego  Sent: 8/2/2024  12:15 PM EDT  To: #  Subject: Liver Patient                                    Hi,  I was scheduled last week on July 26 but your  called and said it was going to be a 2 hour wait.  Unfortunately I could not make it because of that.  I've been waiting on a call to reschedule but haven't  heard back.  I also asked for a prescription refill for my Oxycodone.  And I also need to be drained  again .  Thank you,  Shoshana

## 2024-08-06 NOTE — TELEPHONE ENCOUNTER
Patient called to get scheduled for a f/u per [facility] appointments are still on hold and she on the list to be scheduled when slot becomes available patient hung up before I could relay information

## 2024-08-08 ENCOUNTER — TELEPHONE (OUTPATIENT)
Age: 39
End: 2024-08-08

## 2024-08-08 NOTE — TELEPHONE ENCOUNTER
----- Message from Remedios Younger sent at 8/8/2024 12:57 PM EDT -----  Regarding: FW: reschedule  Call patient to see if they can come tomorrow at 8am or 1pm    ----- Message -----  From: Ana Holloway  Sent: 7/25/2024   2:57 PM EDT  To: Remedios Younger  Subject: reschedule                                       When explaining to patient her appointment could take up to 2hours and allow time in case runs behind she stated that would not work she has court at 1pm and needs to reschedule

## 2024-08-09 ENCOUNTER — OFFICE VISIT (OUTPATIENT)
Age: 39
End: 2024-08-09
Payer: MEDICAID

## 2024-08-09 VITALS
TEMPERATURE: 98.1 F | SYSTOLIC BLOOD PRESSURE: 117 MMHG | HEART RATE: 126 BPM | BODY MASS INDEX: 21.65 KG/M2 | WEIGHT: 122.2 LBS | HEIGHT: 63 IN | OXYGEN SATURATION: 98 % | RESPIRATION RATE: 17 BRPM | DIASTOLIC BLOOD PRESSURE: 70 MMHG

## 2024-08-09 DIAGNOSIS — K70.31 ALCOHOLIC CIRRHOSIS OF LIVER WITH ASCITES (HCC): ICD-10-CM

## 2024-08-09 DIAGNOSIS — R52 PAIN: Primary | ICD-10-CM

## 2024-08-09 PROCEDURE — 99214 OFFICE O/P EST MOD 30 MIN: CPT | Performed by: INTERNAL MEDICINE

## 2024-08-09 RX ORDER — OXYCODONE HYDROCHLORIDE 5 MG/1
5 TABLET ORAL 2 TIMES DAILY
Qty: 60 TABLET | Refills: 0 | Status: SHIPPED | OUTPATIENT
Start: 2024-08-09 | End: 2024-09-08

## 2024-08-09 ASSESSMENT — PATIENT HEALTH QUESTIONNAIRE - PHQ9
2. FEELING DOWN, DEPRESSED OR HOPELESS: NOT AT ALL
1. LITTLE INTEREST OR PLEASURE IN DOING THINGS: NOT AT ALL
SUM OF ALL RESPONSES TO PHQ QUESTIONS 1-9: 0
SUM OF ALL RESPONSES TO PHQ9 QUESTIONS 1 & 2: 0
DEPRESSION UNABLE TO ASSESS: FUNCTIONAL CAPACITY MOTIVATION LIMITS ACCURACY
SUM OF ALL RESPONSES TO PHQ QUESTIONS 1-9: 0

## 2024-08-09 NOTE — PROGRESS NOTES
Identified pt with two pt identifiers(name and ). Reviewed record in preparation for visit and have obtained necessary documentation.  Vitals:    24 1316   BP: 117/70   Site: Left Upper Arm   Position: Sitting   Cuff Size: Medium Adult   Pulse: (!) 126   Resp: 17   Temp: 98.1 °F (36.7 °C)   TempSrc: Temporal   SpO2: 98%   Weight: 55.4 kg (122 lb 3.2 oz)   Height: 1.6 m (5' 3\")        Health Maintenance Review: Patient reminded of \"due or due soon\" health maintenance. I have asked the patient to contact his/her primary care provider (PCP) for follow-up on his/her health maintenance.    Coordination of Care Questionnaire:  :   1) Have you been to an emergency room, urgent care, or hospitalized since your last visit?  If yes, where when, and reason for visit? no       2. Have seen or consulted any other health care provider since your last visit?   If yes, where when, and reason for visit?  No      Patient is accompanied by self I have received verbal consent from Shoshana Diego to discuss any/all medical information while they are present in the room.   
6/2024.  Appetite is grealty improved    The patient is not experiencing the following symptoms which are commonly seen in this liver disorder:   nausea,   vomiting,   problems concentrating,   swelling of the abdomen,   swelling of the lower extremities,   hematemesis,   hematochezia.    The patient has severe limitations in functional activities which can be attributed to the liver disease       ASSESSMENT AND PLAN:  Cirrhosis  The diagnosis of cirrhosis is based upon imaging, laboratory studies, complications of cirrhosis.    Cirrhosis is secondary to alcohol.       The CTP is 9.  Child class B.  The MELD score is down from 21 to 18.    Alcohol associated liver disease  The diagnosis is based upon a history of consuming alcohol in excess, pattern of AST>ALT, serology that is negative for other causes of chronic liver disease.      A liver biopsy has not been performed.    Fibroscan has not been performed.    AST is elevated.  ALT is normal.  ALP is elevated.  Liver function is depressed.  The platelet count has increased and now at lower limit of normal.    The patient has consumed 20 alcoholic beverages daily.  The patient has been abstinent from alcohol since 5/2024.  Discussed the need to remain abstinent from alcohol.    Alcohol use disorder  The patient has an alcohol use disorder that is in remission.   The patient has cirrhosis and has stopped consuming alcohol.  We need to check PETH    Ascites   Ascites developed for the first time in 5/2024.  Ascites has resolved on the current hernandez of diuretics step 1  Paracentesis was first performed 5/2024.   She has not required a paracentesis since 6/2024.      Will continue diuretics step 1.    The patient was counseled regarding the need to maintain sodium restriction and the types of foods containing high amounts of sodium to be avoided.    Lower extremity edema  Edema has resolved with current dose of diuretics.    Screening for Esophageal varices   The patient

## 2024-08-13 ENCOUNTER — CLINICAL DOCUMENTATION (OUTPATIENT)
Age: 39
End: 2024-08-13

## 2024-08-15 DIAGNOSIS — G62.9 POLYNEUROPATHY, UNSPECIFIED: ICD-10-CM

## 2024-08-15 NOTE — TELEPHONE ENCOUNTER
PCP: Yee Johnston MD     Last appt:  7/1/2024      Future Appointments   Date Time Provider Department Center   11/1/2024 11:30 AM Tang Rg MD [facility] AM[facility]          Requested Prescriptions     Pending Prescriptions Disp Refills    pregabalin (LYRICA) 75 MG capsule 60 capsule 0     Sig: TAKE 1 CAPSULE BY MOUTH TWICE DAILY. MAX DAILY AMOUNT: 150 MG. DO NOT TAKE IF PREGNANT

## 2024-08-16 RX ORDER — PREGABALIN 75 MG/1
CAPSULE ORAL
Qty: 60 CAPSULE | Refills: 0 | Status: SHIPPED | OUTPATIENT
Start: 2024-08-16 | End: 2024-09-27

## 2024-08-26 ENCOUNTER — TELEPHONE (OUTPATIENT)
Facility: CLINIC | Age: 39
End: 2024-08-26

## 2024-08-26 NOTE — TELEPHONE ENCOUNTER
Caller requests Refill of:    pregabalin (LYRICA) 75 MG capsule     Please send to:    Jeison chase, pt changed pharmacy     Visit Appointment History:  Future Appointments:  Future Appointments   Date Time Provider Department Center   11/1/2024 11:30 AM Tang Rg MD [facility]         Last Appointment With Me:  7/1/2024         Caller confirmed instructions and dosages as correct.    Caller was advised that Meds will be refilled as soon as possible, however there can be a 48-72 business hour turn around on refill requests.

## 2024-09-02 RX ORDER — SPIRONOLACTONE 50 MG/1
100 TABLET, FILM COATED ORAL DAILY
Qty: 30 TABLET | Refills: 1
Start: 2024-09-02

## 2024-09-02 RX ORDER — FUROSEMIDE 40 MG
40 TABLET ORAL DAILY
Qty: 90 TABLET | Refills: 0
Start: 2024-09-02

## 2024-09-04 NOTE — TELEPHONE ENCOUNTER
Spoke to pt verified name and . Pt is aware of Dr. Johnston's message. She also stated abdomen is bloated. She did take a pregnancy test \"the blue one\" and was positive. She has a IUD and did not believe it, took another pregnancy test \"the pink one\" and was neg. I advised her to call OBGYN. Pt understood and had no further questions.    [FreeTextEntry1] : 46 y/o female s/p bilateral revision breast reconstruction YOSHI flap with mastopexy, bilateral nipple reconstruction liposuctions of the axilla roll, hips and abdomen donor site, possible dog ear, and mediport removal on 02/23/2024. Denies any f/c/n/v. Patient is not taking any pain medications. Patient had a right breast biopsy in 06/2024, which showed fat necrosis. Patient is having pain in her right breast and abdomen. Patient finished PT a month ago, she states the physical therapist informed her she should start OT. Patient had nipples tattooed in 05/2024 at [facility].

## 2024-09-09 ENCOUNTER — HOSPITAL ENCOUNTER (EMERGENCY)
Facility: HOSPITAL | Age: 39
Discharge: LEFT BEFORE DISCHARGE | End: 2024-09-09
Payer: MEDICAID

## 2024-09-09 ENCOUNTER — APPOINTMENT (OUTPATIENT)
Facility: HOSPITAL | Age: 39
End: 2024-09-09
Payer: MEDICAID

## 2024-09-09 VITALS
TEMPERATURE: 97 F | BODY MASS INDEX: 19.14 KG/M2 | HEART RATE: 90 BPM | WEIGHT: 108.03 LBS | OXYGEN SATURATION: 93 % | HEIGHT: 63 IN | DIASTOLIC BLOOD PRESSURE: 72 MMHG | RESPIRATION RATE: 15 BRPM | SYSTOLIC BLOOD PRESSURE: 98 MMHG

## 2024-09-09 DIAGNOSIS — R18.8 OTHER ASCITES: ICD-10-CM

## 2024-09-09 DIAGNOSIS — K52.9 COLITIS: ICD-10-CM

## 2024-09-09 DIAGNOSIS — R10.9 ABDOMINAL PAIN, UNSPECIFIED ABDOMINAL LOCATION: Primary | ICD-10-CM

## 2024-09-09 DIAGNOSIS — D64.9 ANEMIA, UNSPECIFIED TYPE: ICD-10-CM

## 2024-09-09 DIAGNOSIS — I95.9 HYPOTENSION, UNSPECIFIED HYPOTENSION TYPE: ICD-10-CM

## 2024-09-09 DIAGNOSIS — R74.8 ELEVATED LIPASE: ICD-10-CM

## 2024-09-09 LAB
ALBUMIN SERPL-MCNC: 2.7 G/DL (ref 3.5–5)
ALBUMIN/GLOB SERPL: 0.6 (ref 1.1–2.2)
ALP SERPL-CCNC: 162 U/L (ref 45–117)
ALT SERPL-CCNC: 35 U/L (ref 12–78)
ANION GAP SERPL CALC-SCNC: 8 MMOL/L (ref 2–12)
APPEARANCE UR: ABNORMAL
APTT PPP: 26.5 SEC (ref 22.1–31)
AST SERPL-CCNC: 88 U/L (ref 15–37)
BACTERIA URNS QL MICRO: ABNORMAL /HPF
BASOPHILS # BLD: 0.1 K/UL (ref 0–0.1)
BASOPHILS NFR BLD: 1 % (ref 0–1)
BILIRUB SERPL-MCNC: 1.5 MG/DL (ref 0.2–1)
BILIRUB UR QL: NEGATIVE
BUN SERPL-MCNC: 7 MG/DL (ref 6–20)
BUN/CREAT SERPL: 13 (ref 12–20)
CALCIUM SERPL-MCNC: 9 MG/DL (ref 8.5–10.1)
CHLORIDE SERPL-SCNC: 111 MMOL/L (ref 97–108)
CO2 SERPL-SCNC: 23 MMOL/L (ref 21–32)
COLOR UR: ABNORMAL
CREAT SERPL-MCNC: 0.52 MG/DL (ref 0.55–1.02)
DIFFERENTIAL METHOD BLD: ABNORMAL
EKG ATRIAL RATE: 92 BPM
EKG DIAGNOSIS: NORMAL
EKG P AXIS: 61 DEGREES
EKG P-R INTERVAL: 176 MS
EKG Q-T INTERVAL: 364 MS
EKG QRS DURATION: 74 MS
EKG QTC CALCULATION (BAZETT): 450 MS
EKG R AXIS: 52 DEGREES
EKG T AXIS: 46 DEGREES
EKG VENTRICULAR RATE: 92 BPM
EOSINOPHIL # BLD: 0.4 K/UL (ref 0–0.4)
EOSINOPHIL NFR BLD: 7 % (ref 0–7)
EPITH CASTS URNS QL MICRO: ABNORMAL /LPF
ERYTHROCYTE [DISTWIDTH] IN BLOOD BY AUTOMATED COUNT: 19.7 % (ref 11.5–14.5)
GLOBULIN SER CALC-MCNC: 4.4 G/DL (ref 2–4)
GLUCOSE SERPL-MCNC: 106 MG/DL (ref 65–100)
GLUCOSE UR STRIP.AUTO-MCNC: NEGATIVE MG/DL
HCG UR QL: NEGATIVE
HCT VFR BLD AUTO: 24.1 % (ref 35–47)
HGB BLD-MCNC: 7.1 G/DL (ref 11.5–16)
HGB UR QL STRIP: NEGATIVE
HYALINE CASTS URNS QL MICRO: ABNORMAL /LPF (ref 0–2)
IMM GRANULOCYTES # BLD AUTO: 0 K/UL (ref 0–0.04)
IMM GRANULOCYTES NFR BLD AUTO: 1 % (ref 0–0.5)
INR PPP: 1.1 (ref 0.9–1.1)
KETONES UR QL STRIP.AUTO: NEGATIVE MG/DL
LEUKOCYTE ESTERASE UR QL STRIP.AUTO: NEGATIVE
LIPASE SERPL-CCNC: 115 U/L (ref 13–75)
LYMPHOCYTES # BLD: 1.5 K/UL (ref 0.8–3.5)
LYMPHOCYTES NFR BLD: 23 % (ref 12–49)
MCH RBC QN AUTO: 30 PG (ref 26–34)
MCHC RBC AUTO-ENTMCNC: 29.5 G/DL (ref 30–36.5)
MCV RBC AUTO: 101.7 FL (ref 80–99)
MONOCYTES # BLD: 0.9 K/UL (ref 0–1)
MONOCYTES NFR BLD: 14 % (ref 5–13)
NEUTS SEG # BLD: 3.4 K/UL (ref 1.8–8)
NEUTS SEG NFR BLD: 54 % (ref 32–75)
NITRITE UR QL STRIP.AUTO: NEGATIVE
NRBC # BLD: 0 K/UL (ref 0–0.01)
NRBC BLD-RTO: 0 PER 100 WBC
PH UR STRIP: 7 (ref 5–8)
PLATELET # BLD AUTO: 103 K/UL (ref 150–400)
PMV BLD AUTO: 10.3 FL (ref 8.9–12.9)
POTASSIUM SERPL-SCNC: 3.5 MMOL/L (ref 3.5–5.1)
PROT SERPL-MCNC: 7.1 G/DL (ref 6.4–8.2)
PROT UR STRIP-MCNC: NEGATIVE MG/DL
PROTHROMBIN TIME: 11.9 SEC (ref 9–11.1)
RBC # BLD AUTO: 2.37 M/UL (ref 3.8–5.2)
RBC #/AREA URNS HPF: ABNORMAL /HPF (ref 0–5)
SODIUM SERPL-SCNC: 142 MMOL/L (ref 136–145)
SP GR UR REFRACTOMETRY: 1.01
THERAPEUTIC RANGE: NORMAL SECS (ref 58–77)
URINE CULTURE IF INDICATED: ABNORMAL
UROBILINOGEN UR QL STRIP.AUTO: 1 EU/DL (ref 0.2–1)
WBC # BLD AUTO: 6.3 K/UL (ref 3.6–11)
WBC URNS QL MICRO: ABNORMAL /HPF (ref 0–4)

## 2024-09-09 PROCEDURE — 81025 URINE PREGNANCY TEST: CPT

## 2024-09-09 PROCEDURE — 6360000002 HC RX W HCPCS: Performed by: PHYSICIAN ASSISTANT

## 2024-09-09 PROCEDURE — 6360000004 HC RX CONTRAST MEDICATION: Performed by: PHYSICIAN ASSISTANT

## 2024-09-09 PROCEDURE — 93005 ELECTROCARDIOGRAM TRACING: CPT | Performed by: PHYSICIAN ASSISTANT

## 2024-09-09 PROCEDURE — 85610 PROTHROMBIN TIME: CPT

## 2024-09-09 PROCEDURE — 74177 CT ABD & PELVIS W/CONTRAST: CPT

## 2024-09-09 PROCEDURE — 99285 EMERGENCY DEPT VISIT HI MDM: CPT

## 2024-09-09 PROCEDURE — 83690 ASSAY OF LIPASE: CPT

## 2024-09-09 PROCEDURE — 81001 URINALYSIS AUTO W/SCOPE: CPT

## 2024-09-09 PROCEDURE — 85025 COMPLETE CBC W/AUTO DIFF WBC: CPT

## 2024-09-09 PROCEDURE — 36415 COLL VENOUS BLD VENIPUNCTURE: CPT

## 2024-09-09 PROCEDURE — 80053 COMPREHEN METABOLIC PANEL: CPT

## 2024-09-09 PROCEDURE — 96374 THER/PROPH/DIAG INJ IV PUSH: CPT

## 2024-09-09 PROCEDURE — 85730 THROMBOPLASTIN TIME PARTIAL: CPT

## 2024-09-09 PROCEDURE — 76705 ECHO EXAM OF ABDOMEN: CPT

## 2024-09-09 PROCEDURE — 6370000000 HC RX 637 (ALT 250 FOR IP): Performed by: PHYSICIAN ASSISTANT

## 2024-09-09 RX ORDER — OXYCODONE HYDROCHLORIDE 5 MG/1
5 TABLET ORAL
Status: COMPLETED | OUTPATIENT
Start: 2024-09-09 | End: 2024-09-09

## 2024-09-09 RX ORDER — FENTANYL CITRATE 50 UG/ML
25 INJECTION, SOLUTION INTRAMUSCULAR; INTRAVENOUS
Status: COMPLETED | OUTPATIENT
Start: 2024-09-09 | End: 2024-09-09

## 2024-09-09 RX ORDER — IOPAMIDOL 755 MG/ML
100 INJECTION, SOLUTION INTRAVASCULAR
Status: COMPLETED | OUTPATIENT
Start: 2024-09-09 | End: 2024-09-09

## 2024-09-09 RX ADMIN — FENTANYL CITRATE 25 MCG: 50 INJECTION INTRAMUSCULAR; INTRAVENOUS at 09:23

## 2024-09-09 RX ADMIN — IOPAMIDOL 100 ML: 755 INJECTION, SOLUTION INTRAVENOUS at 08:14

## 2024-09-09 RX ADMIN — OXYCODONE HYDROCHLORIDE 5 MG: 5 TABLET ORAL at 08:56

## 2024-09-09 ASSESSMENT — PAIN DESCRIPTION - ORIENTATION: ORIENTATION: RIGHT;LOWER

## 2024-09-09 ASSESSMENT — LIFESTYLE VARIABLES
HOW OFTEN DO YOU HAVE A DRINK CONTAINING ALCOHOL: NEVER
HOW MANY STANDARD DRINKS CONTAINING ALCOHOL DO YOU HAVE ON A TYPICAL DAY: PATIENT DOES NOT DRINK

## 2024-09-09 ASSESSMENT — PAIN DESCRIPTION - DESCRIPTORS: DESCRIPTORS: SHARP

## 2024-09-09 ASSESSMENT — PAIN DESCRIPTION - FREQUENCY: FREQUENCY: CONTINUOUS

## 2024-09-09 ASSESSMENT — PAIN SCALES - GENERAL
PAINLEVEL_OUTOF10: 9
PAINLEVEL_OUTOF10: 8

## 2024-09-09 ASSESSMENT — PAIN DESCRIPTION - PAIN TYPE: TYPE: ACUTE PAIN

## 2024-09-09 ASSESSMENT — PAIN DESCRIPTION - LOCATION: LOCATION: ABDOMEN

## 2024-09-10 ENCOUNTER — HOSPITAL ENCOUNTER (EMERGENCY)
Facility: HOSPITAL | Age: 39
Discharge: LEFT BEFORE DISCHARGE | End: 2024-09-10
Attending: EMERGENCY MEDICINE
Payer: MEDICAID

## 2024-09-10 VITALS
DIASTOLIC BLOOD PRESSURE: 68 MMHG | HEART RATE: 97 BPM | RESPIRATION RATE: 16 BRPM | SYSTOLIC BLOOD PRESSURE: 98 MMHG | TEMPERATURE: 99.3 F | OXYGEN SATURATION: 100 %

## 2024-09-10 DIAGNOSIS — R10.30 LOWER ABDOMINAL PAIN: Primary | ICD-10-CM

## 2024-09-10 PROCEDURE — 99283 EMERGENCY DEPT VISIT LOW MDM: CPT

## 2024-09-10 ASSESSMENT — PAIN - FUNCTIONAL ASSESSMENT: PAIN_FUNCTIONAL_ASSESSMENT: 0-10

## 2024-09-10 ASSESSMENT — PAIN DESCRIPTION - LOCATION: LOCATION: ABDOMEN

## 2024-09-10 ASSESSMENT — PAIN SCALES - GENERAL: PAINLEVEL_OUTOF10: 8

## 2024-10-07 NOTE — TELEPHONE ENCOUNTER
Caller requests Refill of:  metroNIDAZOLE (METROGEL) 0.75 % vaginal gel   pregabalin (LYRICA) 75 MG capsule (   traZODone (DESYREL) 100 MG tablet     Please send to:  Kaylie murray       Visit Appointment History:  Future Appointments:  No future appointments.      Last Appointment With Me:  3/5/2024         Caller confirmed instructions and dosages as correct.    Caller was advised that Meds will be refilled as soon as possible, however there can be a 48-72 business hour turn around on refill requests.    Physician/Nursing

## 2024-10-21 DIAGNOSIS — G62.9 POLYNEUROPATHY, UNSPECIFIED: ICD-10-CM

## 2024-10-22 RX ORDER — PREGABALIN 75 MG/1
CAPSULE ORAL
Qty: 60 CAPSULE | Refills: 0 | Status: SHIPPED | OUTPATIENT
Start: 2024-10-22 | End: 2024-12-02

## 2024-10-30 ENCOUNTER — OFFICE VISIT (OUTPATIENT)
Age: 39
End: 2024-10-30
Payer: MEDICAID

## 2024-10-30 VITALS
HEIGHT: 63 IN | DIASTOLIC BLOOD PRESSURE: 81 MMHG | OXYGEN SATURATION: 99 % | BODY MASS INDEX: 23.32 KG/M2 | HEART RATE: 101 BPM | WEIGHT: 131.6 LBS | TEMPERATURE: 97.6 F | SYSTOLIC BLOOD PRESSURE: 123 MMHG

## 2024-10-30 DIAGNOSIS — K70.9 ALCOHOLIC LIVER DISEASE (HCC): ICD-10-CM

## 2024-10-30 DIAGNOSIS — R52 PAIN: Primary | ICD-10-CM

## 2024-10-30 LAB
BASOPHILS # BLD AUTO: 0.2 X10E3/UL (ref 0–0.2)
BASOPHILS NFR BLD AUTO: 2 %
EOSINOPHIL # BLD AUTO: 0.3 X10E3/UL (ref 0–0.4)
EOSINOPHIL NFR BLD AUTO: 4 %
ERYTHROCYTE [DISTWIDTH] IN BLOOD BY AUTOMATED COUNT: 18.8 % (ref 11.7–15.4)
HCT VFR BLD AUTO: 26.2 % (ref 34–46.6)
HGB BLD-MCNC: 7.8 G/DL (ref 11.1–15.9)
IMM GRANULOCYTES # BLD AUTO: 0 X10E3/UL (ref 0–0.1)
IMM GRANULOCYTES NFR BLD AUTO: 0 %
LYMPHOCYTES # BLD AUTO: 1.6 X10E3/UL (ref 0.7–3.1)
LYMPHOCYTES NFR BLD AUTO: 20 %
MCH RBC QN AUTO: 25.6 PG (ref 26.6–33)
MCHC RBC AUTO-ENTMCNC: 29.8 G/DL (ref 31.5–35.7)
MCV RBC AUTO: 86 FL (ref 79–97)
MONOCYTES # BLD AUTO: 1 X10E3/UL (ref 0.1–0.9)
MONOCYTES NFR BLD AUTO: 12 %
NEUTROPHILS # BLD AUTO: 5.1 X10E3/UL (ref 1.4–7)
NEUTROPHILS NFR BLD AUTO: 62 %
PLATELET # BLD AUTO: 144 X10E3/UL (ref 150–450)
RBC # BLD AUTO: 3.05 X10E6/UL (ref 3.77–5.28)
WBC # BLD AUTO: 8.2 X10E3/UL (ref 3.4–10.8)

## 2024-10-30 PROCEDURE — 99214 OFFICE O/P EST MOD 30 MIN: CPT | Performed by: INTERNAL MEDICINE

## 2024-10-30 RX ORDER — OXYCODONE HYDROCHLORIDE 5 MG/1
5 TABLET ORAL 2 TIMES DAILY
Qty: 60 TABLET | Refills: 0 | Status: SHIPPED | OUTPATIENT
Start: 2024-10-30 | End: 2024-11-29

## 2024-10-30 ASSESSMENT — PATIENT HEALTH QUESTIONNAIRE - PHQ9
1. LITTLE INTEREST OR PLEASURE IN DOING THINGS: NOT AT ALL
DEPRESSION UNABLE TO ASSESS: FUNCTIONAL CAPACITY MOTIVATION LIMITS ACCURACY
2. FEELING DOWN, DEPRESSED OR HOPELESS: NOT AT ALL
SUM OF ALL RESPONSES TO PHQ QUESTIONS 1-9: 0
SUM OF ALL RESPONSES TO PHQ9 QUESTIONS 1 & 2: 0
SUM OF ALL RESPONSES TO PHQ QUESTIONS 1-9: 0

## 2024-10-30 NOTE — PROGRESS NOTES
[facility]     Tang Rg MD, FACP, MACG, FAASLD   MD Melissa Muse PA-C April S Ashworth, [facility]   Tamica Leo, [facility]   Sondra Maurizio, [facility]  Mark Ponce, [facility]   Mikayla Arauz, [facility]   Jane Aguilaron, [facility]   at [facility]   [address], [address]   [place]  [address] [phone]   FAX: [phone]  [facility]   [address], [address]   [place]  [address] [phone]   FAX: [phone]       Patient Care Team:  Yee Johnston MD as PCP - General  Yee Johnston MD as PCP - Empaneled Provider      Patient Active Problem List   Diagnosis    Anemia    Alcohol abuse, in remission    Alcoholic polyneuropathy (HCC)    Cirrhosis (HCC)    Alcohol induced liver disorder (HCC)    Ascites    Hemorrhoids    Hepatic encephalopathy (HCC)    Sleep apnea       Shoshana Diego is being seen at [facility] for management of cirrhosis that secondary to alcohol induced liver disease    The active problem list, all pertinent past medical history, medications, endoscopic studies, radiologic findings and laboratory findings related to the liver disorder were reviewed and discussed with the patient.      The patient is a 39 y.o. female who was found to have chronic liver disease in 2021     She was found to have cirrhosis in 5/2024 when she developed ascites.    She used to consume 1 Handle of alcohol every 2 weeks or 20 alcohol drinks per week.  She became abstinent from alcohol in 5/2024, had a few drinks after a few weeks and was abstinent from alcohol again.  She started consuming alcohol again when she developed depression due to family issues.      The patient has developed the following complications of cirrhosis: ascites,     In the office today

## 2024-10-30 NOTE — PROGRESS NOTES
Chief Complaint   Patient presents with    Follow-up     Vitals:    10/30/24 1133   BP: 123/81   Pulse: (!) 101   Temp: 97.6 °F (36.4 °C)   SpO2: 99%     \"Have you been to the ER, urgent care clinic since your last visit?  Hospitalized since your last visit?\"    NO    “Have you seen or consulted any other health care providers outside our system since your last visit?”    NO     “Have you had a pap smear?”    NO    No cervical cancer screening on file

## 2024-10-31 LAB
ALBUMIN SERPL-MCNC: 4.1 G/DL (ref 3.9–4.9)
ALP SERPL-CCNC: 171 IU/L (ref 44–121)
ALT SERPL-CCNC: 38 IU/L (ref 0–32)
AST SERPL-CCNC: 96 IU/L (ref 0–40)
BILIRUB DIRECT SERPL-MCNC: 0.77 MG/DL (ref 0–0.4)
BILIRUB SERPL-MCNC: 1.5 MG/DL (ref 0–1.2)
BUN SERPL-MCNC: 7 MG/DL (ref 6–20)
BUN/CREAT SERPL: 12 (ref 9–23)
CALCIUM SERPL-MCNC: 8.9 MG/DL (ref 8.7–10.2)
CHLORIDE SERPL-SCNC: 101 MMOL/L (ref 96–106)
CO2 SERPL-SCNC: 24 MMOL/L (ref 20–29)
CREAT SERPL-MCNC: 0.59 MG/DL (ref 0.57–1)
EGFRCR SERPLBLD CKD-EPI 2021: 117 ML/MIN/1.73
GLUCOSE SERPL-MCNC: 96 MG/DL (ref 70–99)
INR PPP: 1.1 (ref 0.9–1.2)
POTASSIUM SERPL-SCNC: 3.5 MMOL/L (ref 3.5–5.2)
PROT SERPL-MCNC: 7.6 G/DL (ref 6–8.5)
PROTHROMBIN TIME: 12.3 SEC (ref 9.1–12)
SODIUM SERPL-SCNC: 142 MMOL/L (ref 134–144)

## 2024-11-04 LAB
PETH BLD QL SCN: POSITIVE
PETH BLD-MCNC: 1083 NG/ML

## 2024-12-13 ENCOUNTER — OFFICE VISIT (OUTPATIENT)
Age: 39
End: 2024-12-13
Payer: MEDICAID

## 2024-12-13 VITALS
OXYGEN SATURATION: 98 % | RESPIRATION RATE: 16 BRPM | DIASTOLIC BLOOD PRESSURE: 65 MMHG | HEART RATE: 105 BPM | BODY MASS INDEX: 24.31 KG/M2 | WEIGHT: 137.2 LBS | SYSTOLIC BLOOD PRESSURE: 98 MMHG | HEIGHT: 63 IN | TEMPERATURE: 97.9 F

## 2024-12-13 DIAGNOSIS — K70.9 ALCOHOL INDUCED LIVER DISORDER (HCC): ICD-10-CM

## 2024-12-13 DIAGNOSIS — G62.9 POLYNEUROPATHY, UNSPECIFIED: ICD-10-CM

## 2024-12-13 DIAGNOSIS — R52 PAIN: Primary | ICD-10-CM

## 2024-12-13 LAB
ALBUMIN SERPL-MCNC: 3.4 G/DL (ref 3.5–5)
ALBUMIN/GLOB SERPL: 0.9 (ref 1.1–2.2)
ALP SERPL-CCNC: 163 U/L (ref 45–117)
ALT SERPL-CCNC: 36 U/L (ref 12–78)
ANION GAP SERPL CALC-SCNC: 4 MMOL/L (ref 2–12)
AST SERPL-CCNC: 67 U/L (ref 15–37)
BASOPHILS # BLD: 0.1 K/UL (ref 0–0.1)
BASOPHILS NFR BLD: 1 % (ref 0–1)
BILIRUB DIRECT SERPL-MCNC: 0.8 MG/DL (ref 0–0.2)
BILIRUB SERPL-MCNC: 1.2 MG/DL (ref 0.2–1)
BUN SERPL-MCNC: 12 MG/DL (ref 6–20)
BUN/CREAT SERPL: 25 (ref 12–20)
CALCIUM SERPL-MCNC: 8.9 MG/DL (ref 8.5–10.1)
CHLORIDE SERPL-SCNC: 108 MMOL/L (ref 97–108)
CO2 SERPL-SCNC: 26 MMOL/L (ref 21–32)
CREAT SERPL-MCNC: 0.48 MG/DL (ref 0.55–1.02)
DIFFERENTIAL METHOD BLD: ABNORMAL
EOSINOPHIL # BLD: 0.1 K/UL (ref 0–0.4)
EOSINOPHIL NFR BLD: 2 % (ref 0–7)
ERYTHROCYTE [DISTWIDTH] IN BLOOD BY AUTOMATED COUNT: 23.8 % (ref 11.5–14.5)
GLOBULIN SER CALC-MCNC: 3.9 G/DL (ref 2–4)
GLUCOSE SERPL-MCNC: 88 MG/DL (ref 65–100)
HCT VFR BLD AUTO: 26.2 % (ref 35–47)
HGB BLD-MCNC: 7.2 G/DL (ref 11.5–16)
IMM GRANULOCYTES # BLD AUTO: 0.1 K/UL (ref 0–0.04)
IMM GRANULOCYTES NFR BLD AUTO: 1 % (ref 0–0.5)
INR PPP: 1.1 (ref 0.9–1.1)
LYMPHOCYTES # BLD: 1.4 K/UL (ref 0.8–3.5)
LYMPHOCYTES NFR BLD: 21 % (ref 12–49)
MCH RBC QN AUTO: 23.7 PG (ref 26–34)
MCHC RBC AUTO-ENTMCNC: 27.5 G/DL (ref 30–36.5)
MCV RBC AUTO: 86.2 FL (ref 80–99)
MONOCYTES # BLD: 0.9 K/UL (ref 0–1)
MONOCYTES NFR BLD: 14 % (ref 5–13)
NEUTS SEG # BLD: 4 K/UL (ref 1.8–8)
NEUTS SEG NFR BLD: 61 % (ref 32–75)
NRBC # BLD: 0 K/UL (ref 0–0.01)
NRBC BLD-RTO: 0 PER 100 WBC
PLATELET # BLD AUTO: 106 K/UL (ref 150–400)
PMV BLD AUTO: 9.3 FL (ref 8.9–12.9)
POTASSIUM SERPL-SCNC: 4 MMOL/L (ref 3.5–5.1)
PROT SERPL-MCNC: 7.3 G/DL (ref 6.4–8.2)
PROTHROMBIN TIME: 11.8 SEC (ref 9–11.1)
RBC # BLD AUTO: 3.04 M/UL (ref 3.8–5.2)
RBC MORPH BLD: ABNORMAL
SODIUM SERPL-SCNC: 138 MMOL/L (ref 136–145)
WBC # BLD AUTO: 6.6 K/UL (ref 3.6–11)

## 2024-12-13 PROCEDURE — 99214 OFFICE O/P EST MOD 30 MIN: CPT | Performed by: INTERNAL MEDICINE

## 2024-12-13 RX ORDER — OXYCODONE HYDROCHLORIDE 5 MG/1
5 TABLET ORAL EVERY 4 HOURS PRN
Qty: 180 TABLET | Refills: 0 | Status: SHIPPED | OUTPATIENT
Start: 2024-12-13 | End: 2025-01-12

## 2024-12-13 RX ORDER — PREGABALIN 75 MG/1
CAPSULE ORAL
Qty: 180 CAPSULE | Refills: 3 | Status: SHIPPED | OUTPATIENT
Start: 2024-12-13 | End: 2025-01-23

## 2024-12-13 RX ORDER — OXYCODONE HYDROCHLORIDE 5 MG/1
5 TABLET ORAL EVERY 4 HOURS PRN
COMMUNITY
End: 2024-12-13

## 2024-12-13 NOTE — PROGRESS NOTES
Shoshana Diego is a 39 y.o. female    Chief Complaint   Patient presents with    Follow-up     1 month. Pain in lower abdomen and back on right side.     Vitals:    12/13/24 0921   BP: 98/65   Site: Left Upper Arm   Pulse: (!) 105   Resp: 16   Temp: 97.9 °F (36.6 °C)   SpO2: 98%   Weight: 62.2 kg (137 lb 3.2 oz)   Height: 1.6 m (5' 3\")         Health Maintenance Due   Topic Date Due    Hepatitis A vaccine (1 of 2 - Risk 2-dose series) Never done    Cervical cancer screen  Never done    Flu vaccine (1) Never done    COVID-19 Vaccine (1 - 2023-24 season) Never done         \"Have you been to the ER, urgent care clinic since your last visit?  Hospitalized since your last visit?\"    NO    “Have you seen or consulted any other health care providers outside of [facility] since your last visit?”    NO        “Have you had a pap smear?”    NO    No cervical cancer screening on file

## 2024-12-13 NOTE — PROGRESS NOTES
[facility]     Tang Rg MD, FACP, MACG, FAASLD   MD Melissa Muse, SUDHA Hubbard, [facility]   Tamica Leo, [facility]   Sondra Steven, [facility]  Mark Ponce, [facility]   Mikayla Arauz, [facility]   Jane Aguilaron, [facility]   at [facility]   [address], [address]   [place]  [address] [phone]   FAX: [phone]  [facility]   [address], [address]   [place]  [address] [phone]   FAX: [phone]       Patient Care Team:  Yee Johnston MD as PCP - General  Yee Johnston MD as PCP - Empaneled Provider      Patient Active Problem List   Diagnosis    Anemia    Alcohol abuse, in remission    Alcoholic polyneuropathy (HCC)    Cirrhosis (HCC)    Alcohol induced liver disorder (HCC)    Ascites    Hemorrhoids    Hepatic encephalopathy (HCC)    Sleep apnea       Shoshana Diego is being seen at [facility] for management of cirrhosis that secondary to alcohol induced liver disease    The active problem list, all pertinent past medical history, medications, endoscopic studies, radiologic findings and laboratory findings related to the liver disorder were reviewed and discussed with the patient.      The patient is a 39 y.o. female who was found to have chronic liver disease in 2021     She was found to have cirrhosis in 5/2024 when she developed ascites.    She used to consume 1 Handle of alcohol every 2 weeks or 20 alcohol drinks per week.  She became abstinent from alcohol in 5/2024, had a few drinks after a few weeks and was abstinent from alcohol again.  She started consuming alcohol again when she developed depression due to family issues.    She continue to consume alcohol.    The patient has developed the following complications of cirrhosis:

## 2024-12-19 LAB
PETH BLD QL SCN: POSITIVE
PETH BLD-MCNC: 759 NG/ML

## 2024-12-30 ENCOUNTER — TELEPHONE (OUTPATIENT)
Age: 39
End: 2024-12-30

## 2024-12-31 ENCOUNTER — APPOINTMENT (OUTPATIENT)
Facility: HOSPITAL | Age: 39
End: 2024-12-31
Payer: MEDICAID

## 2024-12-31 ENCOUNTER — HOSPITAL ENCOUNTER (EMERGENCY)
Facility: HOSPITAL | Age: 39
Discharge: HOME | End: 2024-12-31
Attending: EMERGENCY MEDICINE
Payer: MEDICAID

## 2024-12-31 VITALS
OXYGEN SATURATION: 91 % | SYSTOLIC BLOOD PRESSURE: 111 MMHG | HEART RATE: 58 BPM | TEMPERATURE: 99 F | DIASTOLIC BLOOD PRESSURE: 75 MMHG | HEIGHT: 63 IN | RESPIRATION RATE: 16 BRPM | WEIGHT: 134.48 LBS | BODY MASS INDEX: 23.83 KG/M2

## 2024-12-31 DIAGNOSIS — K70.30 ALCOHOLIC CIRRHOSIS OF LIVER WITHOUT ASCITES (HCC): Primary | ICD-10-CM

## 2024-12-31 DIAGNOSIS — N12 PYELONEPHRITIS: ICD-10-CM

## 2024-12-31 LAB
ALBUMIN SERPL-MCNC: 3.2 G/DL (ref 3.5–5)
ALBUMIN/GLOB SERPL: 0.8 (ref 1.1–2.2)
ALP SERPL-CCNC: 105 U/L (ref 45–117)
ALT SERPL-CCNC: 33 U/L (ref 12–78)
AMMONIA PLAS-SCNC: 44 UMOL/L
ANION GAP SERPL CALC-SCNC: 7 MMOL/L (ref 2–12)
APPEARANCE UR: ABNORMAL
AST SERPL-CCNC: 75 U/L (ref 15–37)
BACTERIA URNS QL MICRO: ABNORMAL /HPF
BASOPHILS # BLD: 0.1 K/UL (ref 0–0.1)
BASOPHILS NFR BLD: 1 % (ref 0–1)
BILIRUB SERPL-MCNC: 1.5 MG/DL (ref 0.2–1)
BILIRUB UR QL: NEGATIVE
BUN SERPL-MCNC: 6 MG/DL (ref 6–20)
BUN/CREAT SERPL: 12 (ref 12–20)
CALCIUM SERPL-MCNC: 8.5 MG/DL (ref 8.5–10.1)
CHLORIDE SERPL-SCNC: 108 MMOL/L (ref 97–108)
CO2 SERPL-SCNC: 25 MMOL/L (ref 21–32)
COLOR UR: ABNORMAL
CREAT SERPL-MCNC: 0.49 MG/DL (ref 0.55–1.02)
DIFFERENTIAL METHOD BLD: ABNORMAL
EOSINOPHIL # BLD: 0.2 K/UL (ref 0–0.4)
EOSINOPHIL NFR BLD: 3 % (ref 0–7)
EPITH CASTS URNS QL MICRO: ABNORMAL /LPF
ERYTHROCYTE [DISTWIDTH] IN BLOOD BY AUTOMATED COUNT: 23.4 % (ref 11.5–14.5)
GLOBULIN SER CALC-MCNC: 4 G/DL (ref 2–4)
GLUCOSE SERPL-MCNC: 99 MG/DL (ref 65–100)
GLUCOSE UR STRIP.AUTO-MCNC: NEGATIVE MG/DL
HCT VFR BLD AUTO: 25.5 % (ref 35–47)
HGB BLD-MCNC: 7.4 G/DL (ref 11.5–16)
HGB UR QL STRIP: NEGATIVE
IMM GRANULOCYTES # BLD AUTO: 0 K/UL (ref 0–0.04)
IMM GRANULOCYTES NFR BLD AUTO: 0 % (ref 0–0.5)
INR PPP: 1.3 (ref 0.9–1.1)
KETONES UR QL STRIP.AUTO: NEGATIVE MG/DL
LEUKOCYTE ESTERASE UR QL STRIP.AUTO: ABNORMAL
LIPASE SERPL-CCNC: 28 U/L (ref 13–75)
LYMPHOCYTES # BLD: 1.5 K/UL (ref 0.8–3.5)
LYMPHOCYTES NFR BLD: 24 % (ref 12–49)
MCH RBC QN AUTO: 23.1 PG (ref 26–34)
MCHC RBC AUTO-ENTMCNC: 29 G/DL (ref 30–36.5)
MCV RBC AUTO: 79.4 FL (ref 80–99)
MONOCYTES # BLD: 0.8 K/UL (ref 0–1)
MONOCYTES NFR BLD: 13 % (ref 5–13)
NEUTS SEG # BLD: 3.8 K/UL (ref 1.8–8)
NEUTS SEG NFR BLD: 59 % (ref 32–75)
NITRITE UR QL STRIP.AUTO: NEGATIVE
NRBC # BLD: 0 K/UL (ref 0–0.01)
NRBC BLD-RTO: 0 PER 100 WBC
PH UR STRIP: 7 (ref 5–8)
PLATELET # BLD AUTO: 83 K/UL (ref 150–400)
PMV BLD AUTO: 8.7 FL (ref 8.9–12.9)
POTASSIUM SERPL-SCNC: 3.3 MMOL/L (ref 3.5–5.1)
PROT SERPL-MCNC: 7.2 G/DL (ref 6.4–8.2)
PROT UR STRIP-MCNC: ABNORMAL MG/DL
PROTHROMBIN TIME: 13 SEC (ref 9–11.1)
RBC # BLD AUTO: 3.21 M/UL (ref 3.8–5.2)
RBC #/AREA URNS HPF: ABNORMAL /HPF (ref 0–5)
RBC MORPH BLD: ABNORMAL
SODIUM SERPL-SCNC: 140 MMOL/L (ref 136–145)
SP GR UR REFRACTOMETRY: 1.02
URINE CULTURE IF INDICATED: ABNORMAL
UROBILINOGEN UR QL STRIP.AUTO: 4 EU/DL (ref 0.2–1)
WBC # BLD AUTO: 6.4 K/UL (ref 3.6–11)
WBC URNS QL MICRO: ABNORMAL /HPF (ref 0–4)

## 2024-12-31 PROCEDURE — 80053 COMPREHEN METABOLIC PANEL: CPT

## 2024-12-31 PROCEDURE — 85610 PROTHROMBIN TIME: CPT

## 2024-12-31 PROCEDURE — 6360000002 HC RX W HCPCS: Performed by: PHYSICIAN ASSISTANT

## 2024-12-31 PROCEDURE — 6360000002 HC RX W HCPCS: Performed by: EMERGENCY MEDICINE

## 2024-12-31 PROCEDURE — 87186 SC STD MICRODIL/AGAR DIL: CPT

## 2024-12-31 PROCEDURE — 96374 THER/PROPH/DIAG INJ IV PUSH: CPT

## 2024-12-31 PROCEDURE — 99284 EMERGENCY DEPT VISIT MOD MDM: CPT

## 2024-12-31 PROCEDURE — 96375 TX/PRO/DX INJ NEW DRUG ADDON: CPT

## 2024-12-31 PROCEDURE — 87088 URINE BACTERIA CULTURE: CPT

## 2024-12-31 PROCEDURE — 36415 COLL VENOUS BLD VENIPUNCTURE: CPT

## 2024-12-31 PROCEDURE — 82140 ASSAY OF AMMONIA: CPT

## 2024-12-31 PROCEDURE — 76705 ECHO EXAM OF ABDOMEN: CPT

## 2024-12-31 PROCEDURE — 85025 COMPLETE CBC W/AUTO DIFF WBC: CPT

## 2024-12-31 PROCEDURE — 87086 URINE CULTURE/COLONY COUNT: CPT

## 2024-12-31 PROCEDURE — 83690 ASSAY OF LIPASE: CPT

## 2024-12-31 PROCEDURE — 93005 ELECTROCARDIOGRAM TRACING: CPT | Performed by: EMERGENCY MEDICINE

## 2024-12-31 PROCEDURE — 81001 URINALYSIS AUTO W/SCOPE: CPT

## 2024-12-31 PROCEDURE — 96376 TX/PRO/DX INJ SAME DRUG ADON: CPT

## 2024-12-31 RX ORDER — HYDROMORPHONE HYDROCHLORIDE 1 MG/ML
1 INJECTION, SOLUTION INTRAMUSCULAR; INTRAVENOUS; SUBCUTANEOUS ONCE
Status: COMPLETED | OUTPATIENT
Start: 2024-12-31 | End: 2024-12-31

## 2024-12-31 RX ORDER — HYDROMORPHONE HYDROCHLORIDE 1 MG/ML
0.5 INJECTION, SOLUTION INTRAMUSCULAR; INTRAVENOUS; SUBCUTANEOUS ONCE
Status: COMPLETED | OUTPATIENT
Start: 2024-12-31 | End: 2024-12-31

## 2024-12-31 RX ORDER — ONDANSETRON 2 MG/ML
4 INJECTION INTRAMUSCULAR; INTRAVENOUS EVERY 6 HOURS PRN
Status: DISCONTINUED | OUTPATIENT
Start: 2024-12-31 | End: 2024-12-31 | Stop reason: HOSPADM

## 2024-12-31 RX ADMIN — HYDROMORPHONE HYDROCHLORIDE 0.5 MG: 1 INJECTION, SOLUTION INTRAMUSCULAR; INTRAVENOUS; SUBCUTANEOUS at 14:57

## 2024-12-31 RX ADMIN — ONDANSETRON 4 MG: 2 INJECTION INTRAMUSCULAR; INTRAVENOUS at 11:38

## 2024-12-31 RX ADMIN — HYDROMORPHONE HYDROCHLORIDE 1 MG: 1 INJECTION, SOLUTION INTRAMUSCULAR; INTRAVENOUS; SUBCUTANEOUS at 10:41

## 2024-12-31 NOTE — DISCHARGE INSTRUCTIONS
Thank you for choosing our Emergency Department for your care.  It is our privilege to care for you in your time of need.  In the next several days, you may receive a survey via email or mailed to your home about your experience with our team.  We would greatly appreciate you taking a few minutes to complete the survey, as we use this information to learn what we have done well and what we could be doing better. Thank you for trusting us with your care!    Below you will find a list of your tests from today's visit.   Labs and Radiology Studies  Recent Results (from the past 12 hour(s))   EKG 12 Lead “IF” over 40 years of age, and Upper Abd pain, SOB, Diaphoresis, Diabetes, or Tachycardia greater than or equal to 120 bmp. (Use as indication for order).    Collection Time: 12/31/24  9:29 AM   Result Value Ref Range    Ventricular Rate 100 BPM    Atrial Rate 100 BPM    P-R Interval 150 ms    QRS Duration 82 ms    Q-T Interval 348 ms    QTc Calculation (Bazett) 448 ms    P Axis 76 degrees    R Axis 64 degrees    T Axis 6 degrees    Diagnosis       Normal sinus rhythm  Cannot rule out Anterior infarct , age undetermined  Abnormal ECG  When compared with ECG of 09-SEP-2024 10:06,  No significant change was found     CBC with Auto Differential    Collection Time: 12/31/24  9:45 AM   Result Value Ref Range    WBC 6.4 3.6 - 11.0 K/uL    RBC 3.21 (L) 3.80 - 5.20 M/uL    Hemoglobin 7.4 (L) 11.5 - 16.0 g/dL    Hematocrit 25.5 (L) 35.0 - 47.0 %    MCV 79.4 (L) 80.0 - 99.0 FL    MCH 23.1 (L) 26.0 - 34.0 PG    MCHC 29.0 (L) 30.0 - 36.5 g/dL    RDW 23.4 (H) 11.5 - 14.5 %    Platelets 83 (L) 150 - 400 K/uL    MPV 8.7 (L) 8.9 - 12.9 FL    Nucleated RBCs 0.0 0  WBC    nRBC 0.00 0.00 - 0.01 K/uL    Neutrophils % 59 32 - 75 %    Lymphocytes % 24 12 - 49 %    Monocytes % 13 5 - 13 %    Eosinophils % 3 0 - 7 %    Basophils % 1 0 - 1 %    Immature Granulocytes % 0 0.0 - 0.5 %    Neutrophils Absolute 3.8 1.8 - 8.0 K/UL

## 2024-12-31 NOTE — ED NOTES
Answered call bell, pt now with nausea after pain meds- verbal order for Zofran- pt requested ice chips head of bed readjusted for comfort.   Hydroxychloroquine Counseling:  I discussed with the patient that a baseline ophthalmologic exam is needed at the start of therapy and every year thereafter while on therapy. A CBC may also be warranted for monitoring.  The side effects of this medication were discussed with the patient, including but not limited to agranulocytosis, aplastic anemia, seizures, rashes, retinopathy, and liver toxicity. Patient instructed to call the office should any adverse effect occur.  The patient verbalized understanding of the proper use and possible adverse effects of Plaquenil.  All the patient's questions and concerns were addressed.

## 2025-01-01 LAB
EKG ATRIAL RATE: 100 BPM
EKG DIAGNOSIS: NORMAL
EKG P AXIS: 76 DEGREES
EKG P-R INTERVAL: 150 MS
EKG Q-T INTERVAL: 348 MS
EKG QRS DURATION: 82 MS
EKG QTC CALCULATION (BAZETT): 448 MS
EKG R AXIS: 64 DEGREES
EKG T AXIS: 6 DEGREES
EKG VENTRICULAR RATE: 100 BPM

## 2025-01-06 RX ORDER — NITROFURANTOIN 25; 75 MG/1; MG/1
100 CAPSULE ORAL 2 TIMES DAILY
Qty: 10 CAPSULE | Refills: 0 | Status: SHIPPED | OUTPATIENT
Start: 2025-01-06 | End: 2025-01-11

## 2025-01-21 NOTE — ED PROVIDER NOTES
[facility] EMERGENCY DEPARTMENT  EMERGENCY DEPARTMENT ENCOUNTER       Pt Name: Shoshana Diego  MRN: [id number]  Birthdate 1985  Date of evaluation: 12/31/2024  Provider: Rigo Trevino MD   PCP: Yee Johnston MD  Note Started: 10:56 PM EST 1/20/25     CHIEF COMPLAINT       Chief Complaint   Patient presents with    Bloated     Pt with history of liver cirrhosis, states it has been several months she has been drained and is experiencing pain and swelling to R side of abdomen        HISTORY OF PRESENT ILLNESS: 1 or more elements      History From: Patient  HPI Limitations: None     Shoshana Diego is a 39 y.o. female who presents with complaints of abdominal bloating.  Pt has h/o alcoholic cirrhosis, concerned she could be \"retaining fluid\".  She complaints of abd pain, but no fever, diarrhea, shortness of breath, or chest pain.     Nursing Notes were all reviewed and agreed with or any disagreements were addressed in the HPI.     REVIEW OF SYSTEMS      Review of Systems     Positives and Pertinent negatives as per HPI.    PAST HISTORY     Past Medical History:  Past Medical History:   Diagnosis Date    Bartholin cyst     Cirrhosis of liver with ascites (HCC)     Hospitalized on 05/01/24    Cirrhosis of liver with ascites (HCC)     Hepatic encephalopathy (HCC)     Infectious disease 2012    MRSA in left and right axillary    Neuropathy     Sleep apnea          Past Surgical History:  Past Surgical History:   Procedure Laterality Date    ESOPHAGOGASTRODUODENOSCOPY  05/13/2024    Dr. Howell. Small esophageal varices. Mild portal hypertensive gastropathy.    FRACTURE SURGERY      OTHER SURGICAL HISTORY Left 2019    Elbow    UPPER GASTROINTESTINAL ENDOSCOPY N/A 5/13/2024    ESOPHAGOGASTRODUODENOSCOPY performed by Ryland Howell MD at [facility] ENDOSCOPY       Family History:  Family History   Problem Relation Age of Onset    Psoriasis Sister     Bipolar Disorder Brother     Schizophrenia Brother     No Known

## 2025-02-14 ENCOUNTER — OFFICE VISIT (OUTPATIENT)
Age: 40
End: 2025-02-14
Payer: MEDICAID

## 2025-02-14 VITALS
DIASTOLIC BLOOD PRESSURE: 125 MMHG | BODY MASS INDEX: 23.14 KG/M2 | TEMPERATURE: 98.3 F | HEIGHT: 63 IN | SYSTOLIC BLOOD PRESSURE: 178 MMHG | WEIGHT: 130.6 LBS | HEART RATE: 107 BPM

## 2025-02-14 DIAGNOSIS — K70.30 ALCOHOLIC CIRRHOSIS OF LIVER WITHOUT ASCITES (HCC): Primary | ICD-10-CM

## 2025-02-14 PROCEDURE — 99214 OFFICE O/P EST MOD 30 MIN: CPT | Performed by: INTERNAL MEDICINE

## 2025-02-14 ASSESSMENT — ANXIETY QUESTIONNAIRES
4. TROUBLE RELAXING: MORE THAN HALF THE DAYS
GAD7 TOTAL SCORE: 14
7. FEELING AFRAID AS IF SOMETHING AWFUL MIGHT HAPPEN: MORE THAN HALF THE DAYS
2. NOT BEING ABLE TO STOP OR CONTROL WORRYING: MORE THAN HALF THE DAYS
1. FEELING NERVOUS, ANXIOUS, OR ON EDGE: MORE THAN HALF THE DAYS
5. BEING SO RESTLESS THAT IT IS HARD TO SIT STILL: MORE THAN HALF THE DAYS
6. BECOMING EASILY ANNOYED OR IRRITABLE: MORE THAN HALF THE DAYS
IF YOU CHECKED OFF ANY PROBLEMS ON THIS QUESTIONNAIRE, HOW DIFFICULT HAVE THESE PROBLEMS MADE IT FOR YOU TO DO YOUR WORK, TAKE CARE OF THINGS AT HOME, OR GET ALONG WITH OTHER PEOPLE: VERY DIFFICULT
3. WORRYING TOO MUCH ABOUT DIFFERENT THINGS: MORE THAN HALF THE DAYS

## 2025-02-14 ASSESSMENT — PATIENT HEALTH QUESTIONNAIRE - PHQ9
8. MOVING OR SPEAKING SO SLOWLY THAT OTHER PEOPLE COULD HAVE NOTICED. OR THE OPPOSITE, BEING SO FIGETY OR RESTLESS THAT YOU HAVE BEEN MOVING AROUND A LOT MORE THAN USUAL: MORE THAN HALF THE DAYS
SUM OF ALL RESPONSES TO PHQ QUESTIONS 1-9: 18
9. THOUGHTS THAT YOU WOULD BE BETTER OFF DEAD, OR OF HURTING YOURSELF: MORE THAN HALF THE DAYS
2. FEELING DOWN, DEPRESSED OR HOPELESS: MORE THAN HALF THE DAYS
SUM OF ALL RESPONSES TO PHQ QUESTIONS 1-9: 18
SUM OF ALL RESPONSES TO PHQ QUESTIONS 1-9: 18
6. FEELING BAD ABOUT YOURSELF - OR THAT YOU ARE A FAILURE OR HAVE LET YOURSELF OR YOUR FAMILY DOWN: MORE THAN HALF THE DAYS
SUM OF ALL RESPONSES TO PHQ QUESTIONS 1-9: 16
7. TROUBLE CONCENTRATING ON THINGS, SUCH AS READING THE NEWSPAPER OR WATCHING TELEVISION: MORE THAN HALF THE DAYS
1. LITTLE INTEREST OR PLEASURE IN DOING THINGS: MORE THAN HALF THE DAYS
4. FEELING TIRED OR HAVING LITTLE ENERGY: MORE THAN HALF THE DAYS
5. POOR APPETITE OR OVEREATING: MORE THAN HALF THE DAYS
3. TROUBLE FALLING OR STAYING ASLEEP: MORE THAN HALF THE DAYS
10. IF YOU CHECKED OFF ANY PROBLEMS, HOW DIFFICULT HAVE THESE PROBLEMS MADE IT FOR YOU TO DO YOUR WORK, TAKE CARE OF THINGS AT HOME, OR GET ALONG WITH OTHER PEOPLE: VERY DIFFICULT
DEPRESSION UNABLE TO ASSESS: FUNCTIONAL CAPACITY MOTIVATION LIMITS ACCURACY

## 2025-02-14 ASSESSMENT — COLUMBIA-SUICIDE SEVERITY RATING SCALE - C-SSRS
2. HAVE YOU ACTUALLY HAD ANY THOUGHTS OF KILLING YOURSELF?: NO
1. WITHIN THE PAST MONTH, HAVE YOU WISHED YOU WERE DEAD OR WISHED YOU COULD GO TO SLEEP AND NOT WAKE UP?: NO
6. HAVE YOU EVER DONE ANYTHING, STARTED TO DO ANYTHING, OR PREPARED TO DO ANYTHING TO END YOUR LIFE?: NO

## 2025-02-14 NOTE — PROGRESS NOTES
Chief Complaint   Patient presents with    Follow-up     N/A     Vitals:    02/14/25 1628   BP: (!) 178/125   Site: Left Upper Arm   Position: Sitting   Pulse: (!) 107   Temp: 98.3 °F (36.8 °C)   TempSrc: Temporal   Weight: 59.2 kg (130 lb 9.6 oz)   Height: 1.6 m (5' 3\")     .  \"Have you been to the ER, urgent care clinic since your last visit?  Hospitalized since your last visit?\"    YES - When: approximately a month ago.  Where and Why: [facility].    “Have you seen or consulted any other health care providers outside of [facility] since your last visit?”    NO    Have you had a mammogram?”   NO    No breast cancer screening on file      “Have you had a pap smear?”    NO    No cervical cancer screening on file             Click Here for Release of Records Request    
related to HE.  Psychology: Negative for anxiety, depression.       FAMILY HISTORY:  The father Has/had the following following chronic disease(s): ETOH.    The mother Has/had the following chronic disease(s): None.    There is no family history of liver disease.      SOCIAL HISTORY:  The patient has never been .    The patient has no children.     The patient currently smokes 1 pack of tobacco daily.    The patient has previously consumed alcohol in excess.    The patient has been abstinent from alcohol since 5/2024.    The patient does not work outside the home.        PHYSICAL EXAMINATION:  VS: BP (!) 178/125 (Site: Left Upper Arm, Position: Sitting)   Pulse (!) 107   Temp 98.3 °F (36.8 °C) (Temporal)   Ht 1.6 m (5' 3\")   Wt 59.2 kg (130 lb 9.6 oz)   BMI 23.13 kg/m²     General:  No acute distress.   Eyes:  Sclera anicteric.   ENT:  No oral lesions.  Thyroid normal.  Nodes:  No adenopathy.   Skin:  Spider angiomata.  No jaundice.  Respiratory:  Lungs clear to auscultation.   Cardiovascular:  Regular heart rate.  No murmurs.   Abdomen:  Soft non-tender, Distended with obvious ascites.  Extremities:  No lower extremity edema.  Muscle wasting.  Neurologic:  Alert and oriented.  Cranial nerves grossly intact.  No asterixis.    LABORATORY STUDIES:   Latest Ref Rng 10/30/2024 12/13/2024 12/31/2024   ADARSH - Routine Labs       WBC 3.6 - 11.0 K/uL 8.2  6.6  6.4    ANC 1.8 - 8.0 K/UL 5.1  4.0  3.8    HGB 11.5 - 16.0 g/dL 7.8 (L)  7.2 (L)  7.4 (L)     - 400 K/uL 144 (L)  106 (L)  83 (L)    INR 0.9 - 1.1   1.1  1.1  1.3 (H)    AST 15 - 37 U/L 96 (H)  67 (H)  75 (H)    ALT 12 - 78 U/L 38 (H)  36  33    Alk Phos 45 - 117 U/L 171 (H)  163 (H)  105    Bili, Total 0.2 - 1.0 MG/DL 1.5 (H)  1.2 (H)  1.5 (H)    Bili, Direct 0.0 - 0.2 MG/DL 0.77 (H)  0.8 (H)     Albumin 3.5 - 5.0 g/dL 4.1  3.4 (L)  3.2 (L)    BUN 6 - 20 MG/DL 7  12  6    Creat 0.55 - 1.02 MG/DL 0.59  0.48 (L)  0.49 (L)    Na 136 - 145 mmol/L 142

## 2025-02-26 SDOH — ECONOMIC STABILITY: FOOD INSECURITY: WITHIN THE PAST 12 MONTHS, THE FOOD YOU BOUGHT JUST DIDN'T LAST AND YOU DIDN'T HAVE MONEY TO GET MORE.: PATIENT DECLINED

## 2025-02-26 SDOH — ECONOMIC STABILITY: INCOME INSECURITY: IN THE LAST 12 MONTHS, WAS THERE A TIME WHEN YOU WERE NOT ABLE TO PAY THE MORTGAGE OR RENT ON TIME?: PATIENT DECLINED

## 2025-02-26 SDOH — ECONOMIC STABILITY: TRANSPORTATION INSECURITY
IN THE PAST 12 MONTHS, HAS THE LACK OF TRANSPORTATION KEPT YOU FROM MEDICAL APPOINTMENTS OR FROM GETTING MEDICATIONS?: NO

## 2025-02-26 SDOH — ECONOMIC STABILITY: FOOD INSECURITY: WITHIN THE PAST 12 MONTHS, YOU WORRIED THAT YOUR FOOD WOULD RUN OUT BEFORE YOU GOT MONEY TO BUY MORE.: PATIENT DECLINED

## 2025-02-27 ENCOUNTER — OFFICE VISIT (OUTPATIENT)
Facility: CLINIC | Age: 40
End: 2025-02-27
Payer: MEDICAID

## 2025-02-27 VITALS
BODY MASS INDEX: 23.39 KG/M2 | HEIGHT: 63 IN | RESPIRATION RATE: 16 BRPM | DIASTOLIC BLOOD PRESSURE: 66 MMHG | SYSTOLIC BLOOD PRESSURE: 102 MMHG | WEIGHT: 132 LBS | HEART RATE: 98 BPM | TEMPERATURE: 98.5 F | OXYGEN SATURATION: 97 %

## 2025-02-27 DIAGNOSIS — Z13.820 SCREENING FOR OSTEOPOROSIS: ICD-10-CM

## 2025-02-27 DIAGNOSIS — F33.1 MODERATE EPISODE OF RECURRENT MAJOR DEPRESSIVE DISORDER (HCC): Primary | ICD-10-CM

## 2025-02-27 DIAGNOSIS — Z12.31 ENCOUNTER FOR SCREENING MAMMOGRAM FOR MALIGNANT NEOPLASM OF BREAST: ICD-10-CM

## 2025-02-27 DIAGNOSIS — K70.30 ALCOHOLIC CIRRHOSIS OF LIVER WITHOUT ASCITES (HCC): ICD-10-CM

## 2025-02-27 DIAGNOSIS — G89.4 CHRONIC PAIN SYNDROME: ICD-10-CM

## 2025-02-27 PROCEDURE — 99214 OFFICE O/P EST MOD 30 MIN: CPT | Performed by: INTERNAL MEDICINE

## 2025-02-27 RX ORDER — ESCITALOPRAM OXALATE 5 MG/1
5 TABLET ORAL DAILY
Qty: 30 TABLET | Refills: 2 | Status: SHIPPED | OUTPATIENT
Start: 2025-02-27

## 2025-02-27 NOTE — PROGRESS NOTES
Shoshana Diego  Identified pt with two pt identifiers(name and ).  Chief Complaint   Patient presents with    Cirrhosis       1. Have you been to the ER, urgent care clinic since your last visit?  Hospitalized since your last visit? A month ago ER for for yeast infection.     2. Have you seen or consulted any other health care providers outside of the [facility] since your last visit?  Include any pap smears or colon screening. NO      Provider notified of reason for visit, vitals and flowsheets obtained on patients.     Patient received paperwork for advance directive during previous visit but has not completed at this time     Reviewed record In preparation for visit, huddled with provider and have obtained necessary documentation      Health Maintenance Due   Topic    Hepatitis A vaccine (1 of 2 - Risk 2-dose series)    Cervical cancer screen     Flu vaccine (1)    COVID-19 Vaccine ( season)    Breast cancer screen        Wt Readings from Last 3 Encounters:   25 59.9 kg (132 lb)   25 59.2 kg (130 lb 9.6 oz)   24 61 kg (134 lb 7.7 oz)     Temp Readings from Last 3 Encounters:   25 98.5 °F (36.9 °C) (Oral)   25 98.3 °F (36.8 °C) (Temporal)   24 99 °F (37.2 °C)     BP Readings from Last 3 Encounters:   25 102/66   25 (!) 178/125   24 111/75     Pulse Readings from Last 3 Encounters:   25 98   25 (!) 107   24 58          No data to display                  Learning Assessment:  :         2024    10:30 AM   [facility] AMB LEARNING ASSESSMENT   Primary Learner Patient   level of education GRADUATED HIGH SCHOOL OR GED   Primary Language ENGLISH   Learning Preference DEMONSTRATION   Answered By Patient   Relationship to Learner SELF       Fall Risk Assessment:  :          No data to display                Abuse Screening:  :          No data to display                ADL Screening:  :         2024    10:00 AM   ADL 
[facility]      4. Encounter for screening mammogram for malignant neoplasm of breast  Z12.31 VILMA DIGITAL SCREEN W OR WO CAD BILATERAL      5. Screening for osteoporosis  Z13.820 DEXA BONE DENSITY AXIAL SKELETON         1. Depression: Moderately severe major depression, recurrent.  - Start escitalopram (LEXAPRO) 5 MG tablet; Take 1 tablet by mouth daily  Dispense: 30 tablet; Refill: 2  - Referral to [facility] ([facility]) for further evaluation and management.  - If unable to reach [facility] by phone, advised to visit them directly.    2. Alcoholic Cirrhosis without ascites with chronic, severe RUQ abdominal pain.  - DEXA BONE DENSITY AXIAL SKELETON; Future  - AFL - Jamarcus Leggett MD, [facility]  - Hepatitis A-Hep B Recomb Vac (TWINRIX) 720-20 ELU-MCG/ML AIDEE injection; Inject 1 mL into the muscle once for 1 dose Repeat in 1 month and 6 months.  Dispense: 1 mL; Refill: 2    3. Health maintenance  - VILMA DIGITAL SCREEN W OR WO CAD BILATERAL; Future  - DEXA BONE DENSITY AXIAL SKELETON; Future    4. Health maintenance.  - Place order for mammogram.  - Referral to a gynecologist for Pap smear.  - Encourage scheduling these screenings, even if delayed for a few months.    Return in about 3 months (around 5/27/2025), or if symptoms worsen or fail to improve, for Depression.     I have discussed the diagnosis with the patient and the intended plan as seen in the above orders. Patient is in agreement. The patient has received an after-visit summary and questions were answered concerning future plans. I have discussed medication side effects and warnings with the patient as well.    The patient (or guardian, if applicable) and other individuals in attendance with the patient were advised that Artificial Intelligence will be utilized during this visit to record and process the conversation to generate a clinical note. The patient (or guardian, if applicable) and other individuals in

## 2025-02-27 NOTE — PATIENT INSTRUCTIONS
Patient Instructions  Call [facility] for management of depression and assistance with stopping alcohol long-term.  [facility] ([facility])  Human Services [facility] [address], [address] [place] [address]  [phone]    2. Start escitalopram (Lexapro) 5 mg 1 tablet daily for depression.    3. Schedule appointment with Pain Management specialist.    4. Get hepatitis A and B vaccines at your pharmacy.    5. Someone will call you to schedule mammogram and DEXA bone density study. If you do not hear from anyone in 2 days, call Central Scheduling at [phone].

## 2025-03-13 DIAGNOSIS — G62.9 POLYNEUROPATHY, UNSPECIFIED: ICD-10-CM

## 2025-03-13 RX ORDER — PREGABALIN 75 MG/1
CAPSULE ORAL
Qty: 180 CAPSULE | Refills: 3 | OUTPATIENT
Start: 2025-03-13 | End: 2025-04-23

## 2025-04-04 ENCOUNTER — TELEPHONE (OUTPATIENT)
Age: 40
End: 2025-04-04

## 2025-04-04 DIAGNOSIS — K70.31 ALCOHOLIC CIRRHOSIS OF LIVER WITH ASCITES (HCC): Primary | ICD-10-CM

## 2025-04-04 NOTE — TELEPHONE ENCOUNTER
Patient states she feels like she needs to have a order for a ultra sound to see if she has fluid that needs to be drained because she feels full and looks pregnant

## 2025-04-04 NOTE — TELEPHONE ENCOUNTER
Reviewed pts case with Dr. Bills. She recommends paracentesis. If ascites is present and pt undergoes paracentesis, Dr. Bills recommends pt restart diuretics and have labs checked 1 week after.     Return call placed to pt. I relayed the message from Dr. Bills. I told her the paracentesis order has been placed and provided her with Central Scheduling's phone number. I asked pt to call our office if she restarts diuretics to coordinate lab work, if she doesn't hear from our office.

## 2025-04-04 NOTE — TELEPHONE ENCOUNTER
Called pt to obtain more information. She reports for the last 2-3 weeks her abdomen has become distended. Said she looks and feels like she needs a paracentesis. Confirmed pt adheres to a low-sodium diet. She stopped diuretics after the last visit with Dr. Rg (2/14/2025), but only recently developed abdominal swelling. Per the last office note, pt was supposed to continue furosemide 40 mg daily and spironolactone 100 mg daily. Pt said she was doing better and didn't have swelling, so maybe she got confused and stopped diuretics without provider's instruction. I told pt that Dr. Rg is out of town today, but I'll review information with an alternate provider and be back in touch.

## 2025-04-07 NOTE — TELEPHONE ENCOUNTER
Leeann Sandy, RN  You3 days ago     SH  If paracentesis is performed, please follow up with pt to verify she restarts diuretics and lab check is coordinated.    Thanks!       4/7/25@1041 Attempt to call patient. No answer left voice message. (KF)    4/8/25@1517 Spoke w/patient. Above message reinforced if patient have para restart diuretics and have labs 1 week  after. Patient will call for para and central scheduling number given. Patient verbalize understanding. (KF)    4/9/25@1209 Para scheduling for 4/17/25. (KF)

## 2025-04-10 ENCOUNTER — CLINICAL DOCUMENTATION (OUTPATIENT)
Age: 40
End: 2025-04-10

## 2025-04-10 NOTE — PROGRESS NOTES
[facility]     Tang Rg MD, FACP, MACG, FAASLD   MD Melissa Muse PA-C April S Ashworth, [facility]   Tamica Leo, [facility]   Sondra Steven, FNP-C  Mark Ponce, [facility]   Mikayla Arauz, [address], [address]   [place]  [address] [phone]   FAX: [phone]  [facility]   [address], [address]   [place]  [address] [phone]   FAX: [phone]       To: [facility]     Date: 4/10/2025    RE: Shoshana Diego   1985      Risk of dental extraction surgery in a patient with cirrhosis    The patient has been told that she should have dental extractions.  Local anesthesia is planned.    The patient has cirrhosis Child class B and MELD of under 10.      The INR is normal at 1.1.    The Platelet count is reduced at 83.      The risk of complications hepatic decompensation is very low as long as local anesthesia is utilized.    The patient has increased risk of bleeding after the extraction.    The ultimate decision regarding whether or not to proceed with surgery will depend upon conversations between the surgeon and patient/and/or family and careful assessment of the risk and benefit of the surgical procedure.      Tang Rg MD  [facility]  [address] [place] [address], [address]  [place]  [address] [phone]  [facility]

## 2025-04-11 ENCOUNTER — CLINICAL DOCUMENTATION (OUTPATIENT)
Age: 40
End: 2025-04-11

## 2025-04-16 LAB
ALBUMIN SERPL-MCNC: 3.9 G/DL (ref 3.9–4.9)
ALP SERPL-CCNC: 150 IU/L (ref 44–121)
ALT SERPL-CCNC: 31 IU/L (ref 0–32)
AST SERPL-CCNC: 76 IU/L (ref 0–40)
BASOPHILS # BLD AUTO: 0.1 X10E3/UL (ref 0–0.2)
BASOPHILS NFR BLD AUTO: 1 %
BILIRUB DIRECT SERPL-MCNC: 1.28 MG/DL (ref 0–0.4)
BILIRUB SERPL-MCNC: 2.6 MG/DL (ref 0–1.2)
BUN SERPL-MCNC: 6 MG/DL (ref 6–24)
BUN/CREAT SERPL: 10 (ref 9–23)
CALCIUM SERPL-MCNC: 8.9 MG/DL (ref 8.7–10.2)
CHLORIDE SERPL-SCNC: 102 MMOL/L (ref 96–106)
CO2 SERPL-SCNC: 22 MMOL/L (ref 20–29)
CREAT SERPL-MCNC: 0.58 MG/DL (ref 0.57–1)
EGFRCR SERPLBLD CKD-EPI 2021: 117 ML/MIN/1.73
EOSINOPHIL # BLD AUTO: 0.2 X10E3/UL (ref 0–0.4)
EOSINOPHIL NFR BLD AUTO: 2 %
ERYTHROCYTE [DISTWIDTH] IN BLOOD BY AUTOMATED COUNT: 17.6 % (ref 11.7–15.4)
GLUCOSE SERPL-MCNC: 93 MG/DL (ref 70–99)
HCT VFR BLD AUTO: 24.4 % (ref 34–46.6)
HGB BLD-MCNC: 7.3 G/DL (ref 11.1–15.9)
IMM GRANULOCYTES # BLD AUTO: 0 X10E3/UL (ref 0–0.1)
IMM GRANULOCYTES NFR BLD AUTO: 0 %
INR PPP: 1.3 (ref 0.9–1.2)
LYMPHOCYTES # BLD AUTO: 0.8 X10E3/UL (ref 0.7–3.1)
LYMPHOCYTES NFR BLD AUTO: 12 %
MCH RBC QN AUTO: 24.8 PG (ref 26.6–33)
MCHC RBC AUTO-ENTMCNC: 29.9 G/DL (ref 31.5–35.7)
MCV RBC AUTO: 83 FL (ref 79–97)
MONOCYTES # BLD AUTO: 0.6 X10E3/UL (ref 0.1–0.9)
MONOCYTES NFR BLD AUTO: 10 %
MORPHOLOGY BLD-IMP: ABNORMAL
NEUTROPHILS # BLD AUTO: 4.7 X10E3/UL (ref 1.4–7)
NEUTROPHILS NFR BLD AUTO: 75 %
PLATELET # BLD AUTO: 70 X10E3/UL (ref 150–450)
POTASSIUM SERPL-SCNC: 3.1 MMOL/L (ref 3.5–5.2)
PROT SERPL-MCNC: 6.9 G/DL (ref 6–8.5)
PROTHROMBIN TIME: 14.1 SEC (ref 9.1–12)
RBC # BLD AUTO: 2.94 X10E6/UL (ref 3.77–5.28)
SODIUM SERPL-SCNC: 139 MMOL/L (ref 134–144)
WBC # BLD AUTO: 6.3 X10E3/UL (ref 3.4–10.8)

## 2025-04-17 ENCOUNTER — HOSPITAL ENCOUNTER (OUTPATIENT)
Facility: HOSPITAL | Age: 40
Discharge: HOME | End: 2025-04-20
Attending: INTERNAL MEDICINE
Payer: MEDICAID

## 2025-04-17 DIAGNOSIS — K70.31 ALCOHOLIC CIRRHOSIS OF LIVER WITH ASCITES (HCC): ICD-10-CM

## 2025-04-17 PROCEDURE — 76705 ECHO EXAM OF ABDOMEN: CPT

## 2025-04-17 NOTE — PROGRESS NOTES
Arrived into the xray recovery area, A/O x 4 complaint free. Here today for an image guided paracentesis. US at bedside.

## 2025-04-19 LAB
PETH BLD QL SCN: POSITIVE
PETH BLD-MCNC: 936 NG/ML

## 2025-05-05 LAB
AFP L3 MFR SERPL: NORMAL % (ref 0–9.9)
AFP SERPL-MCNC: 2.8 NG/ML (ref 0–6.4)

## 2025-05-13 ENCOUNTER — TELEPHONE (OUTPATIENT)
Age: 40
End: 2025-05-13

## 2025-05-13 NOTE — TELEPHONE ENCOUNTER
5/13/25@1149 Patient called requesting last office notes to be sent to [facility] pain management provider. I have faxed to [phone] last two office notes and ER visit per patient request. Faxed. (KF)

## 2025-06-03 ENCOUNTER — TELEPHONE (OUTPATIENT)
Facility: CLINIC | Age: 40
End: 2025-06-03

## 2025-06-03 NOTE — TELEPHONE ENCOUNTER
Caller requests Refill of:  pregabalin (LYRICA) 75 MG capsule ()     Please send to:    [id number] - [place], [place] - [address] [place] [address] [phone] - F [phone]  [address] [place] [id number]  Phone: [phone] Fax: [phone]         Visit / Appointment History:  Future Appointment at [facility]:  9/15/2025   Last Appointment With PCP:  2025       Caller confirmed instructions and dosages as correct.    Caller was advised that Meds will be refilled as soon as possible, however there can be a 48-72 business hour turn around on refill requests.

## 2025-06-18 ENCOUNTER — TELEPHONE (OUTPATIENT)
Age: 40
End: 2025-06-18

## 2025-06-18 NOTE — TELEPHONE ENCOUNTER
----- Message from Samuel DAWSON sent at 6/12/2025 10:52 AM EDT -----  Patient had to cancel her appt today and needs to reschedule but for a 3 mo f/u w/ [facility]. Do you have any dates for me to give her that wouldn't push her out too far?

## 2025-06-20 ENCOUNTER — TELEPHONE (OUTPATIENT)
Age: 40
End: 2025-06-20

## 2025-06-21 ENCOUNTER — TELEPHONE (OUTPATIENT)
Age: 40
End: 2025-06-21

## 2025-06-21 NOTE — TELEPHONE ENCOUNTER
#1 attempt to reschedule. Left message asking patient to call to reschedule 6/12 missed appt with [facility]. opening in August, per cfw

## 2025-07-17 ENCOUNTER — TELEPHONE (OUTPATIENT)
Facility: CLINIC | Age: 40
End: 2025-07-17

## 2025-07-17 NOTE — TELEPHONE ENCOUNTER
Spoke to pt verified name and . I let her know Dr. Rg has been refilling this prescription, she would need to get the refill from him. She understood and had no further questions.

## 2025-07-17 NOTE — TELEPHONE ENCOUNTER
Caller requests Refill of:    pregabalin (LYRICA) 75 MG capsule     Please send to:    [facility]     Visit Appointment History:  Future Appointments:  Future Appointments   Date Time Provider Department Center   8/29/2025  8:30 [facility]   9/15/2025  7:50 AM Yee Johnston MD [facility] DE[facility]         Last Appointment With Me:  2/27/2025         Caller confirmed instructions and dosages as correct.    Caller was advised that Meds will be refilled as soon as possible, however there can be a 48-72 business hour turn around on refill requests.

## 2025-07-22 DIAGNOSIS — G62.9 POLYNEUROPATHY, UNSPECIFIED: ICD-10-CM

## 2025-07-22 RX ORDER — PREGABALIN 75 MG/1
CAPSULE ORAL
Qty: 180 CAPSULE | Refills: 0 | Status: SHIPPED | OUTPATIENT
Start: 2025-07-22 | End: 2025-09-01

## 2025-07-22 NOTE — TELEPHONE ENCOUNTER
PCP: Yee Johnston MD     Last appt:  2/27/2025      Future Appointments   Date Time Provider Department Center   8/29/2025  8:30 AM Tang Rg MD [facility]   9/15/2025  7:50 AM Yee Johnston MD [facility]          Requested Prescriptions     Pending Prescriptions Disp Refills    pregabalin (LYRICA) 75 MG capsule 180 capsule 3     Sig: TAKE 1 CAPSULE BY MOUTH TWICE DAILY. MAX DAILY AMOUNT: 150 MG. DO NOT TAKE IF PREGNANT
